# Patient Record
Sex: MALE | Race: WHITE
[De-identification: names, ages, dates, MRNs, and addresses within clinical notes are randomized per-mention and may not be internally consistent; named-entity substitution may affect disease eponyms.]

---

## 2017-02-16 LAB
ALBUMIN SERPL-MCNC: 2.9 G/DL (ref 3.2–4.5)
ALT SERPL-CCNC: 24 U/L (ref 0–55)
ANION GAP SERPL CALC-SCNC: 10 MMOL/L (ref 5–14)
AST SERPL-CCNC: 22 U/L (ref 5–34)
BASOPHILS # BLD AUTO: 0.1 10^3/UL (ref 0–0.1)
BASOPHILS NFR BLD AUTO: 1 % (ref 0–10)
BILIRUB SERPL-MCNC: 0.5 MG/DL (ref 0.1–1)
BUN SERPL-MCNC: 20 MG/DL (ref 7–18)
BUN/CREAT SERPL: 32
CALCIUM SERPL-MCNC: 9.2 MG/DL (ref 8.5–10.1)
CHLORIDE SERPL-SCNC: 94 MMOL/L (ref 98–107)
CO2 SERPL-SCNC: 28 MMOL/L (ref 21–32)
CREAT SERPL-MCNC: 0.63 MG/DL (ref 0.6–1.3)
EOSINOPHIL # BLD AUTO: 0.2 10^3/UL (ref 0–0.3)
EOSINOPHIL NFR BLD AUTO: 2 % (ref 0–10)
ERYTHROCYTE [DISTWIDTH] IN BLOOD BY AUTOMATED COUNT: 19.3 % (ref 10–14.5)
GFR SERPLBLD BASED ON 1.73 SQ M-ARVRAT: > 60 ML/MIN
GLUCOSE SERPL-MCNC: 407 MG/DL (ref 70–105)
LDH SERPL L TO P-CCNC: 402 U/L (ref 125–220)
LYMPHOCYTES # BLD AUTO: 1 X 10^3 (ref 1–4)
LYMPHOCYTES NFR BLD AUTO: 8 % (ref 12–44)
MCH RBC QN AUTO: 31 PG (ref 25–34)
MCHC RBC AUTO-ENTMCNC: 31 G/DL (ref 32–36)
MCV RBC AUTO: 98 FL (ref 80–99)
MONOCYTES # BLD AUTO: 0.6 X 10^3 (ref 0–1)
MONOCYTES NFR BLD AUTO: 5 % (ref 0–12)
NEUTROPHILS # BLD AUTO: 10.8 X 10^3 (ref 1.8–7.8)
NEUTROPHILS NFR BLD AUTO: 85 % (ref 42–75)
PLATELET # BLD: 852 10^3/UL (ref 130–400)
PMV BLD AUTO: 10.1 FL (ref 7.4–10.4)
POTASSIUM SERPL-SCNC: 4.4 MMOL/L (ref 3.6–5)
PROT SERPL-MCNC: 6.9 G/DL (ref 6.4–8.2)
RBC # BLD AUTO: 5.14 10^6/UL (ref 4.35–5.85)
SODIUM SERPL-SCNC: 132 MMOL/L (ref 135–145)
WBC # BLD AUTO: 12.7 10^3/UL (ref 4.3–11)

## 2017-04-13 ENCOUNTER — HOSPITAL ENCOUNTER (OUTPATIENT)
Dept: HOSPITAL 75 - ONC | Age: 67
LOS: 34 days | Discharge: HOME | End: 2017-05-17
Attending: INTERNAL MEDICINE
Payer: MEDICARE

## 2017-04-13 DIAGNOSIS — I10: ICD-10-CM

## 2017-04-13 DIAGNOSIS — F17.210: ICD-10-CM

## 2017-04-13 DIAGNOSIS — Z79.899: ICD-10-CM

## 2017-04-13 DIAGNOSIS — E11.9: ICD-10-CM

## 2017-04-13 DIAGNOSIS — Z79.01: ICD-10-CM

## 2017-04-13 DIAGNOSIS — D45: Primary | ICD-10-CM

## 2017-04-13 LAB
ALBUMIN SERPL-MCNC: 3.2 G/DL (ref 3.2–4.5)
ALT SERPL-CCNC: 41 U/L (ref 0–55)
ANION GAP SERPL CALC-SCNC: 13 MMOL/L (ref 5–14)
AST SERPL-CCNC: 30 U/L (ref 5–34)
BILIRUB SERPL-MCNC: 0.4 MG/DL (ref 0.1–1)
BUN SERPL-MCNC: 16 MG/DL (ref 7–18)
BUN/CREAT SERPL: 25
CALCIUM SERPL-MCNC: 9.1 MG/DL (ref 8.5–10.1)
CHLORIDE SERPL-SCNC: 90 MMOL/L (ref 98–107)
CO2 SERPL-SCNC: 26 MMOL/L (ref 21–32)
CREAT SERPL-MCNC: 0.63 MG/DL (ref 0.6–1.3)
GFR SERPLBLD BASED ON 1.73 SQ M-ARVRAT: > 60 ML/MIN
GLUCOSE SERPL-MCNC: 384 MG/DL (ref 70–105)
LDH SERPL L TO P-CCNC: 322 U/L (ref 125–220)
POTASSIUM SERPL-SCNC: 4.2 MMOL/L (ref 3.6–5)
PROT SERPL-MCNC: 7 G/DL (ref 6.4–8.2)
SODIUM SERPL-SCNC: 129 MMOL/L (ref 135–145)

## 2017-04-13 PROCEDURE — 96365 THER/PROPH/DIAG IV INF INIT: CPT

## 2017-04-13 PROCEDURE — 99195 PHLEBOTOMY: CPT

## 2017-04-13 PROCEDURE — 80053 COMPREHEN METABOLIC PANEL: CPT

## 2017-04-13 PROCEDURE — 83615 LACTATE (LD) (LDH) ENZYME: CPT

## 2017-04-13 PROCEDURE — 82728 ASSAY OF FERRITIN: CPT

## 2017-04-13 PROCEDURE — 99213 OFFICE O/P EST LOW 20 MIN: CPT

## 2017-04-13 PROCEDURE — 85025 COMPLETE CBC W/AUTO DIFF WBC: CPT

## 2017-04-13 PROCEDURE — 96360 HYDRATION IV INFUSION INIT: CPT

## 2017-04-13 PROCEDURE — 36415 COLL VENOUS BLD VENIPUNCTURE: CPT

## 2017-06-22 ENCOUNTER — HOSPITAL ENCOUNTER (OUTPATIENT)
Dept: HOSPITAL 75 - ONC | Age: 67
LOS: 90 days | Discharge: HOME | End: 2017-09-20
Attending: INTERNAL MEDICINE
Payer: MEDICARE

## 2017-06-22 LAB
ALBUMIN SERPL-MCNC: 3.5 GM/DL (ref 3.2–4.5)
ALT SERPL-CCNC: 19 U/L (ref 0–55)
ANION GAP SERPL CALC-SCNC: 12 MMOL/L (ref 5–14)
AST SERPL-CCNC: 27 U/L (ref 5–34)
BASOPHILS # BLD AUTO: 0.1 10^3/UL (ref 0–0.1)
BASOPHILS NFR BLD AUTO: 1 % (ref 0–10)
BILIRUB SERPL-MCNC: 0.5 MG/DL (ref 0.1–1)
BUN SERPL-MCNC: 12 MG/DL (ref 7–18)
BUN/CREAT SERPL: 18 (ref 0–20)
CALCIUM SERPL-MCNC: 9.6 MG/DL (ref 8.5–10.1)
CHLORIDE SERPL-SCNC: 95 MMOL/L (ref 98–107)
CO2 SERPL-SCNC: 24 MMOL/L (ref 21–32)
CREAT SERPL-MCNC: 0.66 MG/DL (ref 0.6–1.3)
EOSINOPHIL # BLD AUTO: 0.3 10^3/UL (ref 0–0.3)
EOSINOPHIL NFR BLD AUTO: 3 % (ref 0–10)
ERYTHROCYTE [DISTWIDTH] IN BLOOD BY AUTOMATED COUNT: 21.7 % (ref 10–14.5)
GFR SERPLBLD BASED ON 1.73 SQ M-ARVRAT: > 60 ML/MIN
GLUCOSE SERPL-MCNC: 302 MG/DL (ref 70–105)
HEMOLYSIS: 75 (ref -100–29)
ICTERUS: 0.2 (ref -100–1.9)
LDH SERPL L TO P-CCNC: 335 U/L (ref 125–220)
LIPEMIA: 29 (ref -100–49)
LYMPHOCYTES # BLD AUTO: 1.2 X 10^3 (ref 1–4)
LYMPHOCYTES NFR BLD AUTO: 12 % (ref 12–44)
MCH RBC QN AUTO: 31 PG (ref 25–34)
MCHC RBC AUTO-ENTMCNC: 32 G/DL (ref 32–36)
MCV RBC AUTO: 95 FL (ref 80–99)
MONOCYTES # BLD AUTO: 0.6 X 10^3 (ref 0–1)
MONOCYTES NFR BLD AUTO: 6 % (ref 0–12)
NEUTROPHILS # BLD AUTO: 7.3 X 10^3 (ref 1.8–7.8)
NEUTROPHILS NFR BLD AUTO: 77 % (ref 42–75)
PLATELET # BLD: 280 10^3/UL (ref 130–400)
PMV BLD AUTO: 10.8 FL (ref 7.4–10.4)
POTASSIUM SERPL-SCNC: 3.9 MMOL/L (ref 3.6–5)
PROT SERPL-MCNC: 8 GM/DL (ref 6.4–8.2)
RBC # BLD AUTO: 5.68 10^6/UL (ref 4.35–5.85)
SODIUM SERPL-SCNC: 131 MMOL/L (ref 135–145)
WBC # BLD AUTO: 9.4 10^3/UL (ref 4.3–11)

## 2017-06-22 PROCEDURE — 80053 COMPREHEN METABOLIC PANEL: CPT

## 2017-06-22 PROCEDURE — 99195 PHLEBOTOMY: CPT

## 2017-06-22 PROCEDURE — 85025 COMPLETE CBC W/AUTO DIFF WBC: CPT

## 2017-06-22 PROCEDURE — 36415 COLL VENOUS BLD VENIPUNCTURE: CPT

## 2017-06-22 PROCEDURE — 83615 LACTATE (LD) (LDH) ENZYME: CPT

## 2018-11-06 NOTE — DIAGNOSTIC IMAGING REPORT
INDICATION: Bilateral foot wounds. The study is performed to

evaluate for osteomyelitis.



TIME OF EXAMINATION: 01:14 p.m.



FINDINGS: Multiple views of bilateral feet were obtained. Right

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

through fifth metatarsals. Resection margins appear to be fairly

smooth. No definite soft tissue gas is identified. Left foot does

show some dorsal soft tissue swelling. No definite bony

destructive changes are seen. No soft tissue gas is identified.

There is generalized demineralization which does limit

evaluation.



IMPRESSION: Postsurgical changes in the right foot. There is soft

tissue swelling on the left. No definite bony destructive changes

are seen to suggest osteomyelitis, however, radiographs may be

insensitive to detection of osteomyelitis in percentage of

patients. If there is continued high clinical index of suspicion,

an MRI or three-phase bone scan could be performed for further

evaluation.



Dictated by: 



  Dictated on workstation # [id number]

## 2018-11-15 NOTE — DIAGNOSTIC IMAGING REPORT
PROCEDURE: MRI left lower extremity with and without contrast.



TECHNIQUE:  Multiplanar, multisequence pre and post

contrast-enhanced MRI of the left lower extremity was

accomplished.



INDICATION: Ulcer on the dorsal aspect of second toe.



COMPARISON: Left foot radiographs from 11/06/2018.



FINDINGS: Dermal ulcer on the dorsal aspect of the second toe at

the level of PIP joint. There is underlying confluent T1

hypointense marrow replacement throughout the distal 2/3rd of the

second proximal phalanx. Minimal marrow changes are also present

within the base of the second middle phalanx. Enhancement is

associated with areas of marrow alteration and these features are

indicative of osteomyelitis.



The remainder of the left forefoot osseous structures have normal

signal and are without osteomyelitis. Marked fatty atrophy of the

musculature of the foot is compatible with long-standing

diabetes. Dorsal subcutaneous edema is present throughout the

forefoot. Enhancing subcutaneous reticulations in the second toe

are indicative of cellulitis. No drainable soft tissue abscess.



IMPRESSION:

1. MRI confirms osteomyelitis of the second middle and proximal

phalanges. No osteomyelitis involvement of the second metatarsal.

2. Second toe cellulitis. No drainable soft tissue abscess within

the left forefoot.



Dictated by: 



  Dictated on workstation # [id number]

## 2018-12-07 NOTE — XMS REPORT
Clinical Summary

 Created on: 2018



Jovanny Shaw

External Reference #: [id number]

: 1950

Sex: Male



Demographics







 Address  [address]  [address]

 

 Home Phone  [phone]

 

 Preferred Language  English

 

 Marital Status  Unknown

 

 Church Affiliation  [employer]

 

 Race  White

 

 Ethnic Group  Not  or 





Author







 Author  [facility]

 

 Organization  [facility]

 

 Address  Unknown

 

 Phone  Unavailable







Support







 Name  Relationship  Address  Phone

 

 Laura Shaw  [employer]  Unknown  [phone]

 

 Leatha Monk  [employer]  Unknown  [phone]







Care Team Providers







 Care Team Member Name  Role  Phone

 

 Saturnino Ash MD  Unavailable  [phone]







Source Comments

Some departments are not documenting in the electronic medical record.  If you 
do not see the information that you expected, contact Release of Information in 
the Health Information Management department at [phone] for further 
assistance in locating additional records.[facility]



Allergies







     Comments



  Active Allergy   Reactions   Severity   Noted Date 

 

      



  Penicillins   UNKNOWN    2014 







Medications







      End Date  Status



  Medication   Sig   Dispensed   Refills   Start  



      Date  

 

         Active



  metFORMIN (GLUCOPHAGE)   Take 1,000 mg    0   



  1,000 mg tablet   by mouth     



   twice daily     



   with meals.     

 

         Active



  sitaGLIPtin (JANUVIA) 100   Take 100 mg    0   



  mg tab tablet   by mouth     



   daily.     

 

         Active



  hydrochlorothiazide   Take 25 mg by    0   



  (HYDRODIURIL) 25 mg   mouth daily.     



  tablet      

 

         Active



  glimepiride (AMARYL) 4 mg   Take 4 mg by    0   



  tablet   mouth twice     



   daily.     

 

         Active



  ramipril (ALTACE) 10 mg   Take 10 mg by    0   



  capsule   mouth twice     



   daily.     

 

         Active



  atorvastatin (LIPITOR) 10   Take 10 mg by    0   



  mg tablet   mouth daily.     

 

         Active



  aspirin 325 mg tablet   Take 650 mg    0   



   by mouth     



   daily.     

 

         Active



  NITROGLYCERIN (NITRO-BID   Apply  to top    0   



  TD)   of skin as     



   directed.     



   Remove at     



   bedtime     

 

         Active



  NAPROXEN SODIUM (ALL DAY   Take 1 Tab by    0   



  PAIN RELIEF PO)   mouth twice     



   daily.     







Active Problems







 



  Problem   Noted Date

 

 



  PAD (peripheral artery disease)   2014

 

 



  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







Social History







     Date



  Tobacco Use   Types   Packs/Day   Years Used 

 

      



  Current Every Day Smoker    









 



  Sex Assigned at Birth   Date Recorded

 

 



  Not on file 









   Industry



  Job Start Date   Occupation 

 

   Not on file



  Not on file   Not on file 









   Travel End



  Travel History   Travel Start 













  No recent travel history available.







Last Filed Vital Signs







   Time Taken



  Vital Sign   Reading 

 

   2014  2:05 PM CDT



  Blood Pressure   131/59 

 

   2014  2:05 PM CDT



  Pulse   79 

 

   2014  2:05 PM CDT



  Temperature   37.1 C (98.8 F) 

 

   2014  2:05 PM CDT



  Respiratory Rate   14 

 

   -



  Oxygen Saturation   - 

 

   -



  Inhaled Oxygen   - 



  Concentration  

 

   2014  2:05 PM CDT



  Weight   102.2 kg (225 lb 3.2 oz) 

 

   2014  2:05 PM CDT



  Height   181.6 cm (5' 11.5") 

 

   2014  2:05 PM CDT



  Body Mass Index   30.97 







Plan of Treatment







   



  Health Maintenance   Due Date   Last Done   Comments

 

   



  HEPATITIS C SCREENING   1950  

 

   



  PHYSICAL (COMPREHENSIVE)   1956  



  EXAM   

 

   



  DTAP/TDAP VACCINES (1 -   1967  



  Tdap)   

 

   



  COLORECTAL CANCER   1999  



  SCREENING   

 

   



  SHINGLES RECOMBINANT   1999  



  VACCINE (1 of 2)   

 

   



  ABDOMINAL AORTIC ANEURYSM   2014  



  SCREENING   

 

   



  PNEUMONIA (PCV13/PPSV23)   2014  



  VACCINES (1 of 2 - PCV13)   

 

   



  INFLUENZA VACCINE   2018  







Results

Not on filefrom Last 3 Months

## 2018-12-07 NOTE — ANESTHESIA-GENERAL POST-OP
MAC


Patient Condition


Mental Status/LOC:  Same as Preop


Cardiovascular:  Satisfactory


Nausea/Vomiting:  Absent


Respiratory:  Satisfactory


Pain:  Controlled


Complications:  Absent





Post Op Complications


Complications


None





Follow Up Care/Instructions


Patient Instructions


None needed.





Anesthesiology Discharge Order


Discharge Order


Patient is doing well, no complaints, stable vital signs, no apparent adverse 

anesthesia problems.   


No complications reported per nursing.











TORO OTERO CRNA Dec 7, 2018 13:06

## 2018-12-07 NOTE — XMS REPORT
Continuity of Care Document

 Created on: 2018



BRYAN MILLS

External Reference #: [id number]

: 1950

Sex: Male



Demographics







 Address  [address] [place]  [address]

 

 Home Phone  [phone] x

 

 Preferred Language  Unknown

 

 Marital Status  Unknown

 

 Advent Affiliation  Unknown

 

 Race  Unknown

 

 Ethnic Group  Unknown





Author







 Author  [facility]

 

 Organization  [facility]

 

 Address  Unknown

 

 Phone  Unavailable



              



Allergies

      





 Active            Description            Code            Type            
Severity            Reaction            Onset            Reported/Identified   
         Relationship to Patient            Clinical Status        

 

 Yes            Penicillins            [id number]            Drug Allergy      
      Unknown            N/A                         2016                
                  



                  



Medications

      



There is no data.                  



Problems

      





 Date Dx Coded            Attending            Type            Code            
Diagnosis            Diagnosed By        

 

 1323            MALISSA BOBLYNN N            Ot            D45            
POLYCYTHEMIA VERA                     

 

 1323            MALISSA, BOBAN N            Ot            Z79.899        
    OTHER LONG TERM (CURRENT) DRUG THERAPY                     

 

 2014            MALISSA, BOBAN N            Ot            238.4          
  POLYCYTHEMIA VERA                     

 

 2014            MALISSA, BOBAN N            Ot            250.00         
   DIAB FABRICE WO COMPL, TYPE II OR UNSPEC TY                     

 

 2014            MALISSA, BOBAN N            Ot            282.7          
  HEMOGLOBINOPATHIES NEC                     

 

 2014            MALISSA, BOBAN N            Ot            305.1          
  TOBACCO USE DISORDER                     

 

 2014            MALISSA, BOBAN N            Ot            401.9          
  HYPERTENSION NOS                     

 

 10/19/2014            MALISSA, BOBAN N            Ot            238.4          
  POLYCYTHEMIA VERA                     

 

 10/19/2014            MALISSA, BOBAN N            Ot            250.00         
   DIAB FABRICE WO COMPL, TYPE II OR UNSPEC TY                     

 

 10/19/2014            MALISSA, BOBAN N            Ot            282.7          
  HEMOGLOBINOPATHIES NEC                     

 

 10/19/2014            MALISSA, BOBAN N            Ot            305.1          
  TOBACCO USE DISORDER                     

 

 10/19/2014            MALISSA, BOBAN N            Ot            401.9          
  HYPERTENSION NOS                     

 

 2014            MALISSA, BOBAN N            Ot            250.00         
                         

 

 2014            MALISSA, BOBAN N            Ot            282.7          
                        

 

 2014            MALISSA, BOBAN N            Ot            305.1          
                        

 

 2014            MALISSA, BOBAN N            Ot            401.9          
                        

 

 2014            RHYS SOTO ARNP            Ot            238.4    
                              

 

 2014            RHYS SOTO ARNP            Ot            250.00   
                               

 

 2014            RHYS SOTO ARNP            Ot            282.7    
                              

 

 2014            BRITTANY RHYS S ARNP            Ot            305.1    
                              

 

 2014            BRITTANY RHYS S ARNP            Ot            401.9    
                              

 

 2014            MALISSA, BOBAN N            Ot            238.4          
                        

 

 2014            MALISSA, BOBAN N            Ot            250.00         
                         

 

 2014            MALISSA, BOBAN N            Ot            282.7          
                        

 

 2014            MALISSA, BOBAN N            Ot            305.1          
                        

 

 2014            MALISSA, BOBAN N            Ot            401.9          
                        

 

 2014            SOTO RHYS S ARNP            Ot            238.4    
                              

 

 2014            SOTO RHYS S ARNP            Ot            250.00   
                               

 

 2014            SOTO, RHYS S ARNP            Ot            282.7    
                              

 

 2014            BRITTANY RHYS S ARNP            Ot            305.1    
                              

 

 2014            BRITTANY RHYS S ARNP            Ot            401.9    
                              

 

 2014            MALISSA, ABDULKADIRAN N            Ot            238.4          
                        

 

 2014            MALISSAABDULKADIRAN N            Ot            250.00         
                         

 

 2014            MALISSA, ABDULKADIRAN N            Ot            282.7          
                        

 

 2014            MALISSA, BOBAN N            Ot            305.1          
                        

 

 2014            MALISSA, BOBAN N            Ot            401.9          
                        

 

 2014            BRITTANY RHYS S ARNP            Ot            238.4    
                              

 

 2014            BRITTANY RHYS S ARNP            Ot            250.00   
                               

 

 2014            BRITTANY RHYS S ARNP            Ot            282.7    
                              

 

 2014            SOTO, RHYS S ARNP            Ot            305.1    
                              

 

 2014            KAVON SOTOCHIQUIS S ARNP            Ot            401.9    
                              

 

 12/15/2014            MALISSA, BOBAN N            Ot            238.4          
                        

 

 12/15/2014            MALISSA, BOBAN N            Ot            250.00         
                         

 

 12/15/2014            MALISSA, BOBAN N            Ot            282.7          
                        

 

 12/15/2014            MALISSA, BOBAN N            Ot            305.1          
                        

 

 12/15/2014            MALISSA, BOBAN N            Ot            401.9          
                        

 

 2015            MALISSA, BOBAN N            Ot            238.4          
  POLYCYTHEMIA VERA                     

 

 2015            MALISSA, BOBAN N            Ot            250.00         
   DIAB FABRICE WO COMPL, TYPE II OR UNSPEC TY                     

 

 2015            MALISSA BOBAN N            Ot            282.7          
  HEMOGLOBINOPATHIES NEC                     

 

 2015            MALISSA, BOBAN N            Ot            305.1          
  TOBACCO USE DISORDER                     

 

 2015            MALISSA, BOBAN N            Ot            401.9          
  HYPERTENSION NOS                     

 

 2015            MALISSA, BOBAN N            Ot            238.4          
                        

 

 2015            MALISSA, BOBAN N            Ot            250.00         
                         

 

 2015            MALISSA, BOBAN N            Ot            282.7          
                        

 

 2015            MALISSA, BOBAN N            Ot            305.1          
                        

 

 2015            MALISSA, BOBAN N            Ot            401.9          
                        

 

 2015            MALISSA, BOBAN N            Ot            238.4          
                        

 

 2015            MALISSA, BOBAN N            Ot            250.00         
                         

 

 2015            MALISSA, BOBAN N            Ot            282.7          
                        

 

 2015            MALISSA, BOBAN N            Ot            305.1          
                        

 

 2015            MALISSA, BOBAN N            Ot            401.9          
                        

 

 2015            SOTORHYS GOMEZ S ARNP            Ot            238.4    
                              

 

 2015            SOTORHYS GOMEZ S ARNP            Ot            250.00   
                               

 

 2015            RHYS SOTO S ARNP            Ot            282.7    
                              

 

 2015            RHYS SOTO S ARNP            Ot            305.1    
                              

 

 2015            RHYS SOTO S ARNP            Ot            401.9    
                              

 

 2015            MALISSA, BOBAN N            Ot            238.4          
  POLYCYTHEMIA VERA                     

 

 2015            MALISAS, BOBAN N            Ot            250.00         
   DIAB FABRICE WO COMPL, TYPE II OR UNSPEC TY                     

 

 2015            MALISSA, BOBAN N            Ot            282.7          
  HEMOGLOBINOPATHIES NEC                     

 

 2015            MALISSA, BOBAN N            Ot            305.1          
  TOBACCO USE DISORDER                     

 

 2015            MALISSA, BOBAN N            Ot            401.9          
  HYPERTENSION NOS                     

 

 2015            MALISSA, BOBAN N            Ot            238.4          
                        

 

 2015            MALISSA, BOBAN N            Ot            250.00         
                         

 

 2015            MALISSA, BOBAN N            Ot            282.7          
                        

 

 2015            MALISSA, BOBAN N            Ot            305.1          
                        

 

 2015            MALISSA, BOBAN N            Ot            401.9          
                        

 

 2015            MALISSA, BOBAN N            Ot            238.4          
                        

 

 2015            MALISSA, BOBAN N            Ot            250.00         
                         

 

 2015            MALISSA, BOBAN N            Ot            282.7          
                        

 

 2015            MALISSA, BOBAN N            Ot            305.1          
                        

 

 2015            MALISSA, BOBAN N            Ot            401.9          
                        

 

 2015            MALISSA, BOBAN N            Ot            238.4          
                        

 

 2015            MALISSA, BOBAN N            Ot            250.00         
                         

 

 2015            MALISSA, BOBAN N            Ot            282.7          
                        

 

 2015            MALISSA, BOBAN N            Ot            305.1          
                        

 

 2015            MALISSA, BOBAN N            Ot            401.9          
                        

 

 2015            MALISSA, BOBAN N            Ot            238.4          
                        

 

 2015            MALISSA, BOBAN N            Ot            250.00         
                         

 

 2015            MALISSA, BOBAN N            Ot            282.7          
                        

 

 2015            MALISSA, BOBAN N            Ot            305.1          
                        

 

 2015            MALISSA, BOBAN N            Ot            401.9          
                        

 

 2015            SOTO, HILAH S ARNP            Ot            238.4    
                              

 

 2015            SOTO HILAH S ARNP            Ot            250.00   
                               

 

 2015            SOTO, HILAH S ARNP            Ot            282.7    
                              

 

 2015            SOTO, HILAH S ARNP            Ot            305.1    
                              

 

 2015            SOTO, HILAH S ARNP            Ot            401.9    
                              

 

 2015            MALISSA, BOBAN N            Ot            250.00         
                         

 

 2015            MALISSA, BOBAN N            Ot            282.7          
                        

 

 2015            MALISSA, BOBAN N            Ot            305.1          
                        

 

 2015            MALISSA, ABDULKADIRAN N            Ot            401.9          
                        

 

 2015            SOTO HILAH S ARNP            Ot            238.4    
                              

 

 2015            SOTO HILAH S ARNP            Ot            250.00   
                               

 

 2015            SOTO HILAH S ARNP            Ot            282.7    
                              

 

 2015            SOTO HILAH S ARNP            Ot            305.1    
                              

 

 2015            SOTO HILAH S ARNP            Ot            401.9    
                              

 

 2015            SOTO HILAH S ARNP            Ot            238.4    
                              

 

 2015            SOTO, HILAH S ARNP            Ot            250.00   
                               

 

 2015            SOTO HILAH S ARNP            Ot            282.7    
                              

 

 2015            SOTO HILAH S ARNP            Ot            305.1    
                              

 

 2015            SOTO HILAH S ARNP            Ot            401.9    
                              

 

 2015            SOTO HILAH S ARNP            Ot            238.4    
                              

 

 2015            SOTO, HILAH S ARNP            Ot            250.00   
                               

 

 2015            SOTORHYS GOMEZ S ARNP            Ot            282.7    
                              

 

 2015            SOTORHYS GOMEZ S ARNP            Ot            305.1    
                              

 

 2015            RHYS SOTO S ARNP            Ot            401.9    
                              

 

 2015            MALISSA BOBAN N            Ot            238.4          
                        

 

 2015            MALISSA ABDULKADIRAN N            Ot            250.00         
                         

 

 2015            MALISSA RACHEL N            Ot            282.7          
                        

 

 2015            MALISSA BOBAN N            Ot            305.1          
                        

 

 2015            MALISSA BOBAN N            Ot            401.9          
                        

 

 2015            KANNAN CHAVEZ MD            Ot            250.92    
                              

 

 2015            BRITTANY RHYS S ARNP            Ot            238.4    
                              

 

 2015            SOTORHYS GOMEZ S ARNP            Ot            250.00   
                               

 

 2015            SOTO RHYS S ARNP            Ot            282.7    
                              

 

 2015            SOTO RHYS S ARNP            Ot            305.1    
                              

 

 2015            SOTO RHYS S ARNP            Ot            401.9    
                              

 

 2015            SOTORHYS GOMEZ S ARNP            Ot            238.4    
                              

 

 2015            SOTORHYS GOMEZ S ARNP            Ot            250.00   
                               

 

 2015            SOTORHYS GOMEZ S ARNP            Ot            282.7    
                              

 

 2015            SOTORHYS GOMEZ S ARNP            Ot            305.1    
                              

 

 2015            SOTORHYS GOMEZ S ARNP            Ot            401.9    
                              

 

 2015            KANNAN CHAVEZ MD            Ot            250.92    
                              

 

 2015            SOTO RHYS S ARNP            Ot            238.4    
                              

 

 2015            SOTO RHYS S ARNP            Ot            250.00   
                               

 

 2015            SOTO RHYS S ARNP            Ot            282.7    
                              

 

 2015            SOTO RHYS S ARNP            Ot            305.1    
                              

 

 2015            BRITTANY RHYS S ARNP            Ot            401.9    
                              

 

 2015            KANNAN CHAVEZ MD            Ot            250.92    
                              

 

 2015            RACHEL LEONARDO N            Ot            238.4          
  POLYCYTHEMIA VERA                     

 

 2015            RACHEL LEONARDO N            Ot            250.00         
   DIAB FABRICE WO COMPL, TYPE II OR UNSPEC TY                     

 

 2015            RACHEL LEONARDO N            Ot            282.7          
  HEMOGLOBINOPATHIES NEC                     

 

 2015            MALISSAABDULKADIR LOYAAN N            Ot            305.1          
  TOBACCO USE DISORDER                     

 

 2015            MALISSA, BOBAN N            Ot            401.9          
  HYPERTENSION NOS                     

 

 2015            RHYS SOTO S ARNP            Ot            238.4    
                              

 

 2015            RHYS SOTO S ARNP            Ot            250.00   
                               

 

 2015            RHYS SOTO S ARNP            Ot            282.7    
                              

 

 2015            RHYS SOTO S ARNP            Ot            305.1    
                              

 

 2015            RHYS SOTO S ARNP            Ot            401.9    
                              

 

 2015            KANNAN CHAVEZ MD            Ot            250.92    
                              

 

 2015            KANNAN CHAVEZ MD            Ot            250.92    
                              

 

 2015            MALISSA, BOBAN N            Ot            238.4          
                        

 

 2015            MALISSA, BOBAN N            Ot            250.00         
                         

 

 2015            MALISSA, BOBAN N            Ot            282.7          
                        

 

 2015            MALISSA, BOBAN N            Ot            305.1          
                        

 

 2015            MALISSA, BOBAN N            Ot            401.9          
                        

 

 2015            MALISSA, BOBAN N            Ot            D45            
                      

 

 2015            MALISSA, BOBAN N            Ot            E11.9          
                        

 

 2015            MALISSA, BOBAN N            Ot            F17.200        
                          

 

 2015            MALISSA, BOBAN N            Ot            I10            
                      

 

 2015            MALISSA, BOBAN N            Ot            238.4          
  POLYCYTHEMIA VERA                     

 

 2015            MALISSA, BOBAN N            Ot            250.00         
   DIAB FABRICE WO COMPL, TYPE II OR UNSPEC TY                     

 

 2015            MALISSA, BOBAN N            Ot            282.7          
  HEMOGLOBINOPATHIES NEC                     

 

 2015            MALISSA, BOBAN N            Ot            305.1          
  TOBACCO USE DISORDER                     

 

 2015            MALISSA, BOBAN N            Ot            401.9          
  HYPERTENSION NOS                     

 

 10/14/2015            RHYS SOTO S ARNP            Ot            238.4    
                              

 

 10/14/2015            RHYS SOTO S ARNP            Ot            282.7    
                              

 

 10/14/2015            RHYS SOTO S ARNP            Ot            723.0    
                              

 

 10/14/2015            RHYS SOTO S ARNP            Ot            V58.69   
                               

 

 2015            RHYS SOTO S ARNP            Ot            238.4    
                              

 

 2015            RHYS SOTO S ARNP            Ot            250.00   
                               

 

 2015            RHYS SOTO ARNP            Ot            282.7    
                              

 

 2015            RHYS SOTO ARNP            Ot            305.1    
                              

 

 2015            RHYS SOTO S ARNP            Ot            401.9    
                              

 

 2015            RACHEL LEONARDO N            Ot            238.4          
                        

 

 2015            MALISSARACHEL LOYA N            Ot            250.00         
                         

 

 2015            MALISSARACHEL LOYA N            Ot            282.7          
                        

 

 2015            MALISSARACHEL LOYA N            Ot            305.1          
                        

 

 2015            MALISSARACHEL LOYA N            Ot            401.9          
                        

 

 2015            MALISSARACHEL LOYA N            Ot            D45            
                      

 

 2015            MALISSAABDULKADIR LOYAAN N            Ot            E11.9          
                        

 

 2015            MALISSAABDULKADIR LOYAAN N            Ot            F17.200        
                          

 

 2015            RACHEL LEONARDO N            Ot            I10            
                      

 

 2016            SOTORHYS GOMEZ ARNP            Ot            D45      
                            

 

 2016            SOTOHRYS GOMEZ S ARNP            Ot            E11.9    
                              

 

 2016            SOTORHYS GOMEZ S ARNP            Ot            F17.210  
                                

 

 2016            RHYS SOTO S ARNP            Ot            I10      
                            

 

 2016            SOTORHYS GOMEZ ARNP            Ot            Z79.899  
                                

 

 2016            RACHEL LEONARDO N            Ot            D45            
POLYCYTHEMIA VERA                     

 

 2016            RACHEL LEONARDO N            Ot            Z79.899        
    OTHER LONG TERM (CURRENT) DRUG THERAPY                     

 

 2016            RACHEL LEONARDO N            Ot            D45            
                      

 

 2016            ABDULKADIR LEONARDOAN N            Ot            Z79.899        
                          

 

 2016            ABDULKADIR LEONARDOAN N            Ot            D45            
                      

 

 2016            RACHEL LEONARDO N            Ot            Z79.899        
                          

 

 2016            SACHI PADILLA ARNP            Ot            Z51.81    
                              

 

 2016            SACHI PADILLA ARNP            Ot            Z79.01    
                              

 

 2016            SACHI PADILLA ARNP            Ot            Z51.81    
        ENCOUNTER FOR THERAPEUTIC DRUG LEVEL MON                     

 

 2016            SACHI PADILLA ARNP            Ot            Z79.01    
        LONG TERM (CURRENT) USE OF ANTICOAGULANT                     

 

 2016            RHYS SOTO S ARNP            Ot            D45      
      POLYCYTHEMIA VERA                     

 

 2016            RHYS SOTO S ARNP            Ot            E11.9    
        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  
          NICOTINE DEPENDENCE, CIGARETTES, UNCOMPL                     

 

 2016            RHYS SOTO ARNP            Ot            I10      
      ESSENTIAL (PRIMARY) HYPERTENSION                     

 

 2016            RHYS SOTO ARNP            Ot            Z79.899  
          OTHER LONG TERM (CURRENT) DRUG THERAPY                     

 

 2016            EADOSACHI GREENE ARNP            Ot            Z51.81    
        ENCOUNTER FOR THERAPEUTIC DRUG LEVEL MON                     

 

 2016            EADOSACHI GREENE ARNP            Ot            Z79.01    
        LONG TERM (CURRENT) USE OF ANTICOAGULANT                     

 

 2016            EADOCONNOR GREENERI FLORIN ARNP            Ot            Z51.81    
        ENCOUNTER FOR THERAPEUTIC DRUG LEVEL MON                     

 

 2016            EADOSACHI GREENE ARNP            Ot            Z79.01    
        LONG TERM (CURRENT) USE OF ANTICOAGULANT                     

 

 2016            AMYDOSACHI GREENE ARNP            Ot            Z51.81    
        ENCOUNTER FOR THERAPEUTIC DRUG LEVEL MON                     

 

 2016            AMYDOSACHI GREENE ARNP            Ot            Z79.01    
        LONG TERM (CURRENT) USE OF ANTICOAGULANT                     

 

 2016            RHYS SOTO ARNP            Ot            D45      
      POLYCYTHEMIA VERA                     

 

 2016            RHYS SOTO ARNP            Ot            E11.9    
        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  
          NICOTINE DEPENDENCE, CIGARETTES, UNCOMPL                     

 

 2016            RHYS SOTOP            Ot            I10      
      ESSENTIAL (PRIMARY) HYPERTENSION                     

 

 2016            RHYS SOTOP            Ot            Z79.899  
          OTHER LONG TERM (CURRENT) DRUG THERAPY                     

 

 2016            RHYS SOTOP            Ot            D45      
      POLYCYTHEMIA VERA                     

 

 2016            RHYS SOTOP            Ot            E11.9    
        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  
          NICOTINE DEPENDENCE, CIGARETTES, UNCOMPL                     

 

 2016            RHYS SOTO ARNP            Ot            I10      
      ESSENTIAL (PRIMARY) HYPERTENSION                     

 

 2016            RHYS SOTO ARNP            Ot            Z79.01   
         LONG TERM (CURRENT) USE OF ANTICOAGULANT                     

 

 2016            RHYS SOTO ARNP            Ot            Z79.899  
          OTHER LONG TERM (CURRENT) DRUG THERAPY                     

 

 2016            KAVON SOTOAH S ARNP            Ot            D45      
      POLYCYTHEMIA VERA                     

 

 2016            RHYS SOTO ARNP            Ot            E11.9    
        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  
          NICOTINE DEPENDENCE, CIGARETTES, UNCOMPL                     

 

 2016            RHYS SOTO ARNP            Ot            I10      
      ESSENTIAL (PRIMARY) HYPERTENSION                     

 

 2016            SOTO RHYS GOMEZ ARNP            Ot            Z79.01   
         LONG TERM (CURRENT) USE OF ANTICOAGULANT                     

 

 2016            RHYS SOTO ARNP            Ot            Z79.899  
          OTHER LONG TERM (CURRENT) DRUG THERAPY                     

 

 2016            RHYS SOTO ARNP            Ot            D45      
      POLYCYTHEMIA VERA                     

 

 2016            RHYS SOTO ARNP            Ot            E11.9    
        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  
          NICOTINE DEPENDENCE, CIGARETTES, UNCOMPL                     

 

 2016            SOTORHYS GOMEZ ARNP            Ot            I10      
      ESSENTIAL (PRIMARY) HYPERTENSION                     

 

 2016            KAVON SOTOCHIQUIS GOMEZ ARNP            Ot            Z79.01   
         LONG TERM (CURRENT) USE OF ANTICOAGULANT                     

 

 2016            BRITTANY RHYS GOMEZ ARNP            Ot            Z79.899  
          OTHER LONG TERM (CURRENT) DRUG THERAPY                     

 

 2016            MALISSARACHEL LOYA N            Ot            D45            
POLYCYTHEMIA VERA                     

 

 2016            MALISSARACHEL LOYA N            Ot            Z79.899        
    OTHER LONG TERM (CURRENT) DRUG THERAPY                     

 

 2016            MALISSARACHEL LOYA N            Ot            D45            
POLYCYTHEMIA VERA                     

 

 2016            MALISSARACHEL LOYA N            Ot            Z79.899        
    OTHER LONG TERM (CURRENT) DRUG THERAPY                     

 

 2016            KAVON SOTOCHIQUIS S ARNP            Ot            Z79.01   
         LONG TERM (CURRENT) USE OF ANTICOAGULANT                     

 

 2016            MALISSARACHEL LOYA N            Ot            D45            
POLYCYTHEMIA VERA                     

 

 2016            MALISSARACHEL LOYA N            Ot            Z79.899        
    OTHER LONG TERM (CURRENT) DRUG THERAPY                     

 

 2016            RHYS SOTO S ARNP            Ot            Z79.01   
         LONG TERM (CURRENT) USE OF ANTICOAGULANT                     

 

 2016            JUDE CHOI MD            Ot            I44.60  
          UNSPECIFIED FASCICULAR BLOCK                     

 

 2016            JUDE CHOI MD            Ot            I45.10  
          UNSPECIFIED RIGHT BUNDLE-BRANCH BLOCK                     

 

 2016            JUDE CHOI MD            Ot            R55     
       SYNCOPE AND COLLAPSE                     

 

 2016            RACHEL LEONARDO            Ot            250.00         
   DIAB FABRICE WO COMPL, TYPE II OR UNSPEC TY                     

 

 2016            RACHEL LEONARDO            Ot            282.7          
  HEMOGLOBINOPATHIES NEC                     

 

 2016            MALISSARACHEL LOYA            Ot            305.1          
  TOBACCO USE DISORDER                     

 

 2016            MALISSARACHEL LOYA N            Ot            401.9          
  HYPERTENSION NOS                     

 

 2016            SOTO, HILAH S ARNP            Ot            238.4    
        POLYCYTHEMIA VERA                     

 

 2016            SOTO, HILAH S ARNP            Ot            250.00   
         DIAB FABRICE WO COMPL, TYPE II OR UNSPEC TY                     

 

 2016            SOTO, HILAH S ARNP            Ot            282.7    
        HEMOGLOBINOPATHIES NEC                     

 

 2016            SOTO, HILAH S ARNP            Ot            305.1    
        TOBACCO USE DISORDER                     

 

 2016            SOTO, HILAH S ARNP            Ot            401.9    
        HYPERTENSION NOS                     

 

 2016            SOTO, HILAH S ARNP            Ot            238.4    
        POLYCYTHEMIA VERA                     

 

 2016            SOTO, HILAH S ARNP            Ot            250.00   
         DIAB FABRICE WO COMPL, TYPE II OR UNSPEC TY                     

 

 2016            SOTO, HILAH S ARNP            Ot            282.7    
        HEMOGLOBINOPATHIES NEC                     

 

 2016            SOTO, HILAH S ARNP            Ot            305.1    
        TOBACCO USE DISORDER                     

 

 2016            SOTO, HILAH S ARNP            Ot            401.9    
        HYPERTENSION NOS                     

 

 2016            SOTO, HILAH S ARNP            Ot            238.4    
        POLYCYTHEMIA VERA                     

 

 2016            SOTO, HILAH S ARNP            Ot            250.00   
         DIAB FABRICE WO COMPL, TYPE II OR UNSPEC TY                     

 

 2016            SOTO, HILAH S ARNP            Ot            282.7    
        HEMOGLOBINOPATHIES NEC                     

 

 2016            SOTO, HILAH S ARNP            Ot            305.1    
        TOBACCO USE DISORDER                     

 

 2016            SOTO, HILAH S ARNP            Ot            401.9    
        HYPERTENSION NOS                     

 

 2016            KANNAN CHAVEZ MD            Ot            250.92    
        DIAB W UNSPEC COMPL, TYPE II OR UNSPEC T                     

 

 2016            SOTO, HILAH S ARNP            Ot            238.4    
        POLYCYTHEMIA VERA                     

 

 2016            SOTO, HILAH S ARNP            Ot            282.7    
        HEMOGLOBINOPATHIES NEC                     

 

 2016            RHYS SOTO ARNP            Ot            723.0    
        CERVICAL SPINAL STENOSIS                     

 

 2016            RHYS SOTO ARNP            Ot            V58.69   
         OT MED,LT,CURRENT USE                     

 

 2016            RHYS SOTO ARNP            Ot            D45      
      POLYCYTHEMIA VERA                     

 

 2016            RHYS SOTO ARNP            Ot            E11.9    
        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  
          NICOTINE DEPENDENCE, CIGARETTES, UNCOMPL                     

 

 2016            RHYS SOTO ARNP            Ot            I10      
      ESSENTIAL (PRIMARY) HYPERTENSION                     

 

 2016            RHYS SOTO ARNP            Ot            Z79.899  
          OTHER LONG TERM (CURRENT) DRUG THERAPY                     

 

 2016            SACHI PADILLA ARNP            Ot            Z51.81    
        ENCOUNTER FOR THERAPEUTIC DRUG LEVEL MON                     

 

 2016            SACHI PADILLA ARNP            Ot            Z79.01    
        LONG TERM (CURRENT) USE OF ANTICOAGULANT                     

 

 2016            SOTORHYS GOMEZ ARNP            Ot            D45      
      POLYCYTHEMIA VERA                     

 

 2016            RHYS SOTO ARNP            Ot            E11.9    
        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  
          NICOTINE DEPENDENCE, CIGARETTES, UNCOMPL                     

 

 2016            RHYS SOTO ARNP            Ot            I10      
      ESSENTIAL (PRIMARY) HYPERTENSION                     

 

 2016            RHYS SOTO ARNP            Ot            Z79.899  
          OTHER LONG TERM (CURRENT) DRUG THERAPY                     

 

 2016            SOTORHYS GOMEZ ARNP            Ot            D45      
      POLYCYTHEMIA VERA                     

 

 2016            RHYS SOTO ARNP            Ot            E11.9    
        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  
          NICOTINE DEPENDENCE, CIGARETTES, UNCOMPL                     

 

 2016            SOTORHYS GOMEZ ARNP            Ot            I10      
      ESSENTIAL (PRIMARY) HYPERTENSION                     

 

 2016            RHYS SOTO ARNP            Ot            Z79.01   
         LONG TERM (CURRENT) USE OF ANTICOAGULANT                     

 

 2016            OSTORHYS GOMEZ ARNP            Ot            Z79.899  
          OTHER LONG TERM (CURRENT) DRUG THERAPY                     

 

 2016            MALISSA, BOBAN N            Ot            D45            
POLYCYTHEMIA VERA                     

 

 2016            RACHEL LEONARDO            Ot            Z79.899        
    OTHER LONG TERM (CURRENT) DRUG THERAPY                     

 

 2016            RHYS SOTO ARNP            Ot            Z79.01   
         LONG TERM (CURRENT) USE OF ANTICOAGULANT                     

 

 2016            RACHEL LEONARDO N            Ot            D45            
POLYCYTHEMIA VERA                     

 

 2016            RACHEL LEONARDO N            Ot            Z79.899        
    OTHER LONG TERM (CURRENT) DRUG THERAPY                     

 

 2016            RACHEL LEONARDO N            Ot            D45            
POLYCYTHEMIA VERA                     

 

 2016            RACHEL LEONARDO N            Ot            Z79.899        
    OTHER LONG TERM (CURRENT) DRUG THERAPY                     

 

 2016            RHYS SOTO S ARNP            Ot            D45      
      POLYCYTHEMIA VERA                     

 

 2016            RHYS SOTO S ARNP            Ot            E11.9    
        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  
          NICOTINE DEPENDENCE, CIGARETTES, UNCOMPL                     

 

 2016            RHYS SOTO S ARNP            Ot            I10      
      ESSENTIAL (PRIMARY) HYPERTENSION                     

 

 2016            RHYS SOTO S ARNP            Ot            Z79.01   
         LONG TERM (CURRENT) USE OF ANTICOAGULANT                     

 

 2016            RHYS SOTO S ARNP            Ot            Z79.899  
          OTHER LONG TERM (CURRENT) DRUG THERAPY                     

 

 2016            STEPH MARTIN, JUDE STATON            Ot            I44.60  
          UNSPECIFIED FASCICULAR BLOCK                     

 

 2016            STEPH MARTIN, JUDE STATON            Ot            I45.10  
          UNSPECIFIED RIGHT BUNDLE-BRANCH BLOCK                     

 

 2016            STEPH MARTIN, JUDE STATON            Ot            R55     
       SYNCOPE AND COLLAPSE                     

 

 2016            RHYS SOTO S ARNP            Ot            D45      
      POLYCYTHEMIA VERA                     

 

 2016            RHYS SOTO ARNP            Ot            E11.9    
        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  
          NICOTINE DEPENDENCE, CIGARETTES, UNCOMPL                     

 

 2016            RHYS SOTO S ARNP            Ot            I10      
      ESSENTIAL (PRIMARY) HYPERTENSION                     

 

 2016            RHYS SOTO S ARNP            Ot            Z79.01   
         LONG TERM (CURRENT) USE OF ANTICOAGULANT                     

 

 2016            RHYS SOTO S ARNP            Ot            Z79.899  
          OTHER LONG TERM (CURRENT) DRUG THERAPY                     

 

 10/10/2016            RACHEL LEONARDO N            Ot            250.00         
   DIAB FABRICE WO COMPL, TYPE II OR UNSPEC TY                     

 

 10/10/2016            RACHEL LEONARDO N            Ot            282.7          
  HEMOGLOBINOPATHIES NEC                     

 

 10/10/2016            RACHEL LEONARDO N            Ot            305.1          
  TOBACCO USE DISORDER                     

 

 10/10/2016            RACHEL LEONARDO N            Ot            401.9          
  HYPERTENSION NOS                     

 

 10/10/2016            SOTO HILAH S ARNP            Ot            238.4    
        POLYCYTHEMIA VERA                     

 

 10/10/2016            SOTO HILAH S ARNP            Ot            250.00   
         DIAB FABRICE WO COMPL, TYPE II OR UNSPEC TY                     

 

 10/10/2016            SOTO HILAH S ARNP            Ot            282.7    
        HEMOGLOBINOPATHIES NEC                     

 

 10/10/2016            SOTO HILAH S ARNP            Ot            305.1    
        TOBACCO USE DISORDER                     

 

 10/10/2016            SOTO HILAH S ARNP            Ot            401.9    
        HYPERTENSION NOS                     

 

 10/10/2016            SOTO HILAH S ARNP            Ot            238.4    
        POLYCYTHEMIA VERA                     

 

 10/10/2016            SOTO HILAH S ARNP            Ot            250.00   
         DIAB FABRICE WO COMPL, TYPE II OR UNSPEC TY                     

 

 10/10/2016            SOTO, HILAH S ARNP            Ot            282.7    
        HEMOGLOBINOPATHIES NEC                     

 

 10/10/2016            SOTO, HILAH S ARNP            Ot            305.1    
        TOBACCO USE DISORDER                     

 

 10/10/2016            SOTO HILAH S ARNP            Ot            401.9    
        HYPERTENSION NOS                     

 

 10/10/2016            SOTO HILAH S ARNP            Ot            238.4    
        POLYCYTHEMIA VERA                     

 

 10/10/2016            SOTO HILAH S ARNP            Ot            250.00   
         DIAB FABRICE WO COMPL, TYPE II OR UNSPEC TY                     

 

 10/10/2016            SOTO HILAH S ARNP            Ot            282.7    
        HEMOGLOBINOPATHIES NEC                     

 

 10/10/2016            SOTO HILAH S ARNP            Ot            305.1    
        TOBACCO USE DISORDER                     

 

 10/10/2016            SOTO HILAH S ARNP            Ot            401.9    
        HYPERTENSION NOS                     

 

 10/10/2016            KANNAN CHAVEZ MD            Ot            250.92    
        DIAB W UNSPEC COMPL, TYPE II OR UNSPEC T                     

 

 10/10/2016            SOTO HILAH S ARNP            Ot            238.4    
        POLYCYTHEMIA VERA                     

 

 10/10/2016            SOTO HILAH S ARNP            Ot            282.7    
        HEMOGLOBINOPATHIES NEC                     

 

 10/10/2016            SOTO HILAH S ARNP            Ot            723.0    
        CERVICAL SPINAL STENOSIS                     

 

 10/10/2016            RHYS SOTO ARNP            Ot            V58.69   
         OTH MED,LT,CURRENT USE                     

 

 10/10/2016            RHYS SOTO ARNP            Ot            D45      
      POLYCYTHEMIA VERA                     

 

 10/10/2016            RHYS SOTO ARNP            Ot            E11.9    
        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                     

 

 10/10/2016            RHYS SOTO ARNP            Ot            F17.210  
          NICOTINE DEPENDENCE, CIGARETTES, UNCOMPL                     

 

 10/10/2016            RHYS SOTO ARNP            Ot            I10      
      ESSENTIAL (PRIMARY) HYPERTENSION                     

 

 10/10/2016            SOTORHYS GOMEZ ARNP            Ot            Z79.899  
          OTHER LONG TERM (CURRENT) DRUG THERAPY                     

 

 10/10/2016            SACHI PADILLA ARNP            Ot            Z51.81    
        ENCOUNTER FOR THERAPEUTIC DRUG LEVEL MON                     

 

 10/10/2016            SACHI PADILLA ARNP            Ot            Z79.01    
        LONG TERM (CURRENT) USE OF ANTICOAGULANT                     

 

 10/10/2016            SOTORHYS GOMEZ ARNP            Ot            D45      
      POLYCYTHEMIA VERA                     

 

 10/10/2016            SOTORHYS GOMEZ ARNP            Ot            E11.9    
        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                     

 

 10/10/2016            RHYS SOTO ARNP            Ot            F17.210  
          NICOTINE DEPENDENCE, CIGARETTES, UNCOMPL                     

 

 10/10/2016            RHYS SOTO ARNP            Ot            I10      
      ESSENTIAL (PRIMARY) HYPERTENSION                     

 

 10/10/2016            SOTORHYS GOMEZ ARNP            Ot            Z79.899  
          OTHER LONG TERM (CURRENT) DRUG THERAPY                     

 

 10/10/2016            SOTORHYS GOMEZ ARNP            Ot            D45      
      POLYCYTHEMIA VERA                     

 

 10/10/2016            SOTORHYS GOMEZ ARNP            Ot            E11.9    
        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                     

 

 10/10/2016            SOTORHYS GOMEZ ARNP            Ot            F17.210  
          NICOTINE DEPENDENCE, CIGARETTES, UNCOMPL                     

 

 10/10/2016            SOTORHYS GOMEZ ARNP            Ot            I10      
      ESSENTIAL (PRIMARY) HYPERTENSION                     

 

 10/10/2016            BRITTANYRHYS ARNP            Ot            Z79.01   
         LONG TERM (CURRENT) USE OF ANTICOAGULANT                     

 

 10/10/2016            BRITTANYRHYS ARNP            Ot            Z79.899  
          OTHER LONG TERM (CURRENT) DRUG THERAPY                     

 

 10/10/2016            RACHEL LEONARDO            Ot            D45            
POLYCYTHEMIA VERA                     

 

 10/10/2016            RACHEL LEONARDO            Ot            Z79.899        
    OTHER LONG TERM (CURRENT) DRUG THERAPY                     

 

 10/10/2016            RHYS SOTO ARNP            Ot            D45      
      POLYCYTHEMIA VERA                     

 

 10/10/2016            RHYS SOTO ARNP            Ot            E11.9    
        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                     

 

 10/10/2016            RHYS SOTO ARNP            Ot            F17.210  
          NICOTINE DEPENDENCE, CIGARETTES, UNCOMPL                     

 

 10/10/2016            RHYS SOTO ARNP            Ot            I10      
      ESSENTIAL (PRIMARY) HYPERTENSION                     

 

 10/10/2016            RHYS SOTO ARNP            Ot            Z79.01   
         LONG TERM (CURRENT) USE OF ANTICOAGULANT                     

 

 10/10/2016            RHYS SOTO ARNP            Ot            Z79.899  
          OTHER LONG TERM (CURRENT) DRUG THERAPY                     

 

 10/10/2016            RACHEL LEONARDO N            Ot            D45            
POLYCYTHEMIA VERA                     

 

 10/10/2016            RACHEL LEONARDO N            Ot            Z79.899        
    OTHER LONG TERM (CURRENT) DRUG THERAPY                     

 

 2016            RACHEL LEONARDO N            Ot            250.00         
   DIAB FABRICE WO COMPL, TYPE II OR UNSPEC TY                     

 

 2016            RACHEL LEONARDO N            Ot            282.7          
  HEMOGLOBINOPATHIES NEC                     

 

 2016            MALISSARACHEL LOYA N            Ot            305.1          
  TOBACCO USE DISORDER                     

 

 2016            MALISSARACHEL LOYA N            Ot            401.9          
  HYPERTENSION NOS                     

 

 2016            RHYS SOTO S ARNP            Ot            238.4    
        POLYCYTHEMIA VERA                     

 

 2016            RHYS SOTO S ARNP            Ot            250.00   
         DIAB FABRICE WO COMPL, TYPE II OR UNSPEC TY                     

 

 2016            RHYS SOTO S ARNP            Ot            282.7    
        HEMOGLOBINOPATHIES NEC                     

 

 2016            RHYS SOTO S ARNP            Ot            305.1    
        TOBACCO USE DISORDER                     

 

 2016            RHYS SOTO ARNP            Ot            401.9    
        HYPERTENSION NOS                     

 

 2016            RHYS SOTO S ARNP            Ot            238.4    
        POLYCYTHEMIA VERA                     

 

 2016            RHYS SOTO S ARNP            Ot            250.00   
         DIAB FABRICE WO COMPL, TYPE II OR UNSPEC TY                     

 

 2016            RHYS SOTO S ARNP            Ot            282.7    
        HEMOGLOBINOPATHIES NEC                     

 

 2016            RHYS SOTO S ARNP            Ot            305.1    
        TOBACCO USE DISORDER                     

 

 2016            RHYS SOTO S ARNP            Ot            401.9    
        HYPERTENSION NOS                     

 

 2016            RHYS SOTO S ARNP            Ot            238.4    
        POLYCYTHEMIA VERA                     

 

 2016            RHYS SOTO ARNP            Ot            250.00   
         DIAB FABRICE WO COMPL, TYPE II OR UNSPEC TY                     

 

 2016            RHYS SOTO ARNP            Ot            282.7    
        HEMOGLOBINOPATHIES NEC                     

 

 2016            RHYS SOTO ARNP            Ot            305.1    
        TOBACCO USE DISORDER                     

 

 2016            RHYS SOTO ARNP            Ot            401.9    
        HYPERTENSION NOS                     

 

 2016            KATHY MARTIN, KANNAN STATON            Ot            250.92    
        DIAB W UNSPEC COMPL, TYPE II OR UNSPEC T                     

 

 2016            RHYS SOTO ARNP            Ot            238.4    
        POLYCYTHEMIA VERA                     

 

 2016            RHYS SOTO ARNP            Ot            282.7    
        HEMOGLOBINOPATHIES NEC                     

 

 2016            RHYS SOTO ARNP            Ot            723.0    
        CERVICAL SPINAL STENOSIS                     

 

 2016            RHYS SOTO ARNP            Ot            V58.69   
         OTH MED,LT,CURRENT USE                     

 

 2016            RHYS SOTO ARNP            Ot            D45      
      POLYCYTHEMIA VERA                     

 

 2016            RHYS SOTO ARNP            Ot            E11.9    
        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  
          NICOTINE DEPENDENCE, CIGARETTES, UNCOMPL                     

 

 2016            RHYS SOTO ARNP            Ot            I10      
      ESSENTIAL (PRIMARY) HYPERTENSION                     

 

 2016            RHYS SOTO ARNP            Ot            Z79.899  
          OTHER LONG TERM (CURRENT) DRUG THERAPY                     

 

 2016            SACHI PADILLA ARNP            Ot            Z51.81    
        ENCOUNTER FOR THERAPEUTIC DRUG LEVEL MON                     

 

 2016            SACHI PADILLA ARNP            Ot            Z79.01    
        LONG TERM (CURRENT) USE OF ANTICOAGULANT                     

 

 2016            RHYS SOTO ARNP            Ot            D45      
      POLYCYTHEMIA VERA                     

 

 2016            RHYS SOTO ARNP            Ot            E11.9    
        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  
          NICOTINE DEPENDENCE, CIGARETTES, UNCOMPL                     

 

 2016            RHYS SOTO ARNP            Ot            I10      
      ESSENTIAL (PRIMARY) HYPERTENSION                     

 

 2016            RHYS SOTO ARNP            Ot            Z79.899  
          OTHER LONG TERM (CURRENT) DRUG THERAPY                     

 

 2016            RHYS SOTO ARNP            Ot            D45      
      POLYCYTHEMIA VERA                     

 

 2016            RHYS SOTO ARNP            Ot            E11.9    
        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                     

 

 2016            SOTORHYS GOMEZ ARNP            Ot            F17.210  
          NICOTINE DEPENDENCE, CIGARETTES, UNCOMPL                     

 

 2016            SOTORHYS GOMEZ ARNP            Ot            I10      
      ESSENTIAL (PRIMARY) HYPERTENSION                     

 

 2016            SOTO RHYS GOMEZ ARNP            Ot            Z79.01   
         LONG TERM (CURRENT) USE OF ANTICOAGULANT                     

 

 2016            SOTO RHYS GOMEZ ARNP            Ot            Z79.899  
          OTHER LONG TERM (CURRENT) DRUG THERAPY                     

 

 2016            SOTO RHYS GOMEZ ARNP            Ot            D45      
      POLYCYTHEMIA VERA                     

 

 2016            SOTO RHYS GOMEZ ARNP            Ot            E11.9    
        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                     

 

 2016            SOTO RHYS GOMEZ ARNP            Ot            F17.210  
          NICOTINE DEPENDENCE, CIGARETTES, UNCOMPL                     

 

 2016            SOTO RHYS GOMEZ ARNP            Ot            I10      
      ESSENTIAL (PRIMARY) HYPERTENSION                     

 

 2016            SOTO RHYS GOMEZ ARNP            Ot            Z79.01   
         LONG TERM (CURRENT) USE OF ANTICOAGULANT                     

 

 2016            SOTORHYS GOMEZ ARNP            Ot            Z79.899  
          OTHER LONG TERM (CURRENT) DRUG THERAPY                     

 

 2017            RACHEL LEONARDO            Ot            D45            
POLYCYTHEMIA VERA                     

 

 2017            MALISSARACHEL LOYA N            Ot            E11.9          
  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                     

 

 2017            RACHEL LEONARDO N            Ot            F17.210        
    NICOTINE DEPENDENCE, CIGARETTES, UNCOMPL                     

 

 2017            RACHEL LEONARDO N            Ot            I10            
ESSENTIAL (PRIMARY) HYPERTENSION                     

 

 2017            RACHEL LEONARDO N            Ot            Z79.01         
   LONG TERM (CURRENT) USE OF ANTICOAGULANT                     

 

 2017            RACHEL LEONARDO N            Ot            Z79.899        
    OTHER LONG TERM (CURRENT) DRUG THERAPY                     

 

 2017            RACHEL LEONARDO N            Ot            D45            
POLYCYTHEMIA VERA                     

 

 2017            MALISSARACHEL LOYA N            Ot            E11.9          
  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                     

 

 2017            MALISSARACHEL LOYA N            Ot            F17.210        
    NICOTINE DEPENDENCE, CIGARETTES, UNCOMPL                     

 

 2017            MALISSARACHEL LOYA N            Ot            I10            
ESSENTIAL (PRIMARY) HYPERTENSION                     

 

 2017            RACHEL LEONARDO N            Ot            Z79.01         
   LONG TERM (CURRENT) USE OF ANTICOAGULANT                     

 

 2017            RACHEL LEONARDO N            Ot            Z79.899        
    OTHER LONG TERM (CURRENT) DRUG THERAPY                     

 

 2017            RACHEL LEONARDO N            Ot            D45            
POLYCYTHEMIA VERA                     

 

 2017            RACHEL LEONARDO N            Ot            E11.9          
  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                     

 

 2017            RACHEL LEONARDO N            Ot            F17.210        
    NICOTINE DEPENDENCE, CIGARETTES, UNCOMPL                     

 

 2017            RACHEL LEONARDO N            Ot            I10            
ESSENTIAL (PRIMARY) HYPERTENSION                     

 

 2017            RACHEL LEONARDO N            Ot            Z79.01         
   LONG TERM (CURRENT) USE OF ANTICOAGULANT                     

 

 2017            RACHEL LEONARDO N            Ot            Z79.899        
    OTHER LONG TERM (CURRENT) DRUG THERAPY                     

 

 2017            RACHEL LEONARDO N            Ot            D45            
POLYCYTHEMIA VERA                     

 

 2017            RACHEL LEONARDO N            Ot            E11.9          
  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                     

 

 2017            RACHEL LEONARDO N            Ot            F17.210        
    NICOTINE DEPENDENCE, CIGARETTES, UNCOMPL                     

 

 2017            RACHEL LEONARDO N            Ot            I10            
ESSENTIAL (PRIMARY) HYPERTENSION                     

 

 2017            RACHEL LEONARDO N            Ot            Z79.01         
   LONG TERM (CURRENT) USE OF ANTICOAGULANT                     

 

 2017            RACHEL LEONARDO N            Ot            Z79.899        
    OTHER LONG TERM (CURRENT) DRUG THERAPY                     

 

 2017            RACHEL LEONARDO N            Ot            D45            
POLYCYTHEMIA VERA                     

 

 2017            RACHEL LEONARDO N            Ot            E11.9          
  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                     

 

 2017            RACHEL LEONARDO N            Ot            F17.210        
    NICOTINE DEPENDENCE, CIGARETTES, UNCOMPL                     

 

 2017            RACHEL LEONARDO N            Ot            I10            
ESSENTIAL (PRIMARY) HYPERTENSION                     

 

 2017            RACHEL LEONARDO N            Ot            Z79.01         
   LONG TERM (CURRENT) USE OF ANTICOAGULANT                     

 

 2017            RACHEL LEONARDO N            Ot            Z79.899        
    OTHER LONG TERM (CURRENT) DRUG THERAPY                     

 

 2017            RHYS SOTO ARNP            Ot            D45      
      POLYCYTHEMIA VERA                     

 

 2017            RHYS SOTO ARNP            Ot            E11.9    
        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                     

 

 2017            RHYS SOTO ARNP            Ot            F17.210  
          NICOTINE DEPENDENCE, CIGARETTES, UNCOMPL                     

 

 2017            BRITTANY RHYS GOMEZ ARNP            Ot            I10      
      ESSENTIAL (PRIMARY) HYPERTENSION                     

 

 2017            RHYS SOTO ARNP            Ot            Z79.01   
         LONG TERM (CURRENT) USE OF ANTICOAGULANT                     

 

 2017            SOTORHYS GOMEZ ARNP            Ot            Z79.899  
          OTHER LONG TERM (CURRENT) DRUG THERAPY                     

 

 2017            MALISSARACHEL LOYA N            Ot            D45            
POLYCYTHEMIA VERA                     

 

 2017            MALISSARACHEL N            Ot            E11.9          
  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                     

 

 2017            MALISSARACHEL N            Ot            F17.210        
    NICOTINE DEPENDENCE, CIGARETTES, UNCOMPL                     

 

 2017            MALISSARACHEL N            Ot            I10            
ESSENTIAL (PRIMARY) HYPERTENSION                     

 

 2017            MALISSARACHEL LOYA N            Ot            Z79.01         
   LONG TERM (CURRENT) USE OF ANTICOAGULANT                     

 

 2017            RACHEL LEONARDO N            Ot            Z79.899        
    OTHER LONG TERM (CURRENT) DRUG THERAPY                     

 

 2017            RACHEL LEONARDO N            Ot            D45            
POLYCYTHEMIA VERA                     

 

 2017            MALISSA BOBAN N            Ot            E11.9          
  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                     

 

 2017            MALISSARACHEL N            Ot            F17.210        
    NICOTINE DEPENDENCE, CIGARETTES, UNCOMPL                     

 

 2017            MALISSARACHEL LOYA N            Ot            I10            
ESSENTIAL (PRIMARY) HYPERTENSION                     

 

 2017            MALISSARACHEL LOYA N            Ot            Z79.01         
   LONG TERM (CURRENT) USE OF ANTICOAGULANT                     

 

 2017            RACHEL LEONARDO N            Ot            Z79.899        
    OTHER LONG TERM (CURRENT) DRUG THERAPY                     

 

 2017            RACHEL LEONARDO N            Ot            D45            
POLYCYTHEMIA VERA                     

 

 2017            MALISSARACHEL N            Ot            E11.9          
  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                     

 

 2017            MALISSARACHEL N            Ot            F17.210        
    NICOTINE DEPENDENCE, CIGARETTES, UNCOMPL                     

 

 2017            MALISSARACHEL N            Ot            I10            
ESSENTIAL (PRIMARY) HYPERTENSION                     

 

 2017            MALISSARACHEL N            Ot            Z79.01         
   LONG TERM (CURRENT) USE OF ANTICOAGULANT                     

 

 2017            MALISSARACHEL N            Ot            Z79.899        
    OTHER LONG TERM (CURRENT) DRUG THERAPY                     

 

 2017            MALISSARACHEL LOYA N            Ot            D45            
POLYCYTHEMIA VERA                     

 

 2017            RACHEL LEONARDO            Ot            E11.9          
  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                     

 

 2017            RACHEL LEONARDO N            Ot            F17.210        
    NICOTINE DEPENDENCE, CIGARETTES, UNCOMPL                     

 

 2017            RACHEL LEONARDO N            Ot            I10            
ESSENTIAL (PRIMARY) HYPERTENSION                     

 

 2017            RACHEL LEONARDO            Ot            Z79.01         
   LONG TERM (CURRENT) USE OF ANTICOAGULANT                     

 

 2017            RACHEL LEONARDO N            Ot            Z79.899        
    OTHER LONG TERM (CURRENT) DRUG THERAPY                     

 

 2018            RACHEL LEONARDO            Ot            D45            
POLYCYTHEMIA VERA                     

 

 2018            RACHEL LEONARDO N            Ot            E11.9          
  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                     

 

 2018            RACHEL LEONARDO            Ot            F17.210        
    NICOTINE DEPENDENCE, CIGARETTES, UNCOMPL                     

 

 2018            RACHEL LEONARDO N            Ot            I10            
ESSENTIAL (PRIMARY) HYPERTENSION                     

 

 2018            RACHEL LEONARDO N            Ot            Z79.01         
   LONG TERM (CURRENT) USE OF ANTICOAGULANT                     

 

 2018            RACHEL LEONARDO N            Ot            Z79.899        
    OTHER LONG TERM (CURRENT) DRUG THERAPY                     

 

 2018            RACHEL LEONARDO N            Ot            250.00         
   DIAB FABRICE WO COMPL, TYPE II OR UNSPEC TY                     

 

 2018            RACHEL LEONARDO N            Ot            282.7          
  HEMOGLOBINOPATHIES NEC                     

 

 2018            RACHEL LEONARDO N            Ot            305.1          
  TOBACCO USE DISORDER                     

 

 2018            RACHEL LEONARDO N            Ot            401.9          
  HYPERTENSION NOS                     

 

 2018            SOTO, HILAH S ARNP            Ot            238.4    
        POLYCYTHEMIA VERA                     

 

 2018            SOTO, HILAH S ARNP            Ot            250.00   
         DIAB FABRICE WO COMPL, TYPE II OR UNSPEC TY                     

 

 2018            SOTO, HILAH S ARNP            Ot            282.7    
        HEMOGLOBINOPATHIES NEC                     

 

 2018            SOTO, HILAH S ARNP            Ot            305.1    
        TOBACCO USE DISORDER                     

 

 2018            SOTO, HILAH S ARNP            Ot            401.9    
        HYPERTENSION NOS                     

 

 2018            SOTO, HILAH S ARNP            Ot            238.4    
        POLYCYTHEMIA VERA                     

 

 2018            SOTO, HILAH S ARNP            Ot            250.00   
         DIAB FABRICE WO COMPL, TYPE II OR UNSPEC TY                     

 

 2018            SOTO, HILAH S ARNP            Ot            282.7    
        HEMOGLOBINOPATHIES NEC                     

 

 2018            RHYS SOTO S ARNP            Ot            305.1    
        TOBACCO USE DISORDER                     

 

 2018            RHYS SOTO ARNP            Ot            401.9    
        HYPERTENSION NOS                     

 

 2018            RHYS SOTO S ARNP            Ot            238.4    
        POLYCYTHEMIA VERA                     

 

 2018            RHYS SOTO S ARNP            Ot            250.00   
         DIAB FABRICE WO COMPL, TYPE II OR UNSPEC TY                     

 

 2018            RHYS SOTO S ARNP            Ot            282.7    
        HEMOGLOBINOPATHIES NEC                     

 

 2018            RHYS SOTO S ARNP            Ot            305.1    
        TOBACCO USE DISORDER                     

 

 2018            RHYS SOTO S ARNP            Ot            401.9    
        HYPERTENSION NOS                     

 

 2018            KANNAN CHAVEZ MD            Ot            250.92    
        DIAB W UNSPEC COMPL, TYPE II OR UNSPEC T                     

 

 2018            KAVON SOTOCHIQUIS S ARNP            Ot            238.4    
        POLYCYTHEMIA VERA                     

 

 2018            RHYS SOTO S ARNP            Ot            282.7    
        HEMOGLOBINOPATHIES NEC                     

 

 2018            RHYS SOTO ARNP            Ot            723.0    
        CERVICAL SPINAL STENOSIS                     

 

 2018            KAVON SOTOCHIQUIS S ARNP            Ot            V58.69   
         OT MED,LT,CURRENT USE                     

 

 2018            RHYS SOTO ARNP            Ot            D45      
      POLYCYTHEMIA VERA                     

 

 2018            RHYS SOTO ARNP            Ot            E11.9    
        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                     

 

 2018            RHYS SOTO S ARNP            Ot            F17.210  
          NICOTINE DEPENDENCE, CIGARETTES, UNCOMPL                     

 

 2018            RHSY SOTO ARNP            Ot            I10      
      ESSENTIAL (PRIMARY) HYPERTENSION                     

 

 2018            RHYS SOTO ARNP            Ot            Z79.899  
          OTHER LONG TERM (CURRENT) DRUG THERAPY                     

 

 2018            SACHI PADILLA ARNP            Ot            Z51.81    
        ENCOUNTER FOR THERAPEUTIC DRUG LEVEL MON                     

 

 2018            SACHI PADILLA ARNP            Ot            Z79.01    
        LONG TERM (CURRENT) USE OF ANTICOAGULANT                     

 

 2018            RHYS SOTO S ARNP            Ot            D45      
      POLYCYTHEMIA VERA                     

 

 2018            RHYS SOTO S ARNP            Ot            E11.9    
        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                     

 

 2018            RHYS SOTO S ARNP            Ot            F17.210  
          NICOTINE DEPENDENCE, CIGARETTES, UNCOMPL                     

 

 2018            SOTO RHYS GOMEZ ARNP            Ot            I10      
      ESSENTIAL (PRIMARY) HYPERTENSION                     

 

 2018            SOTO RHYS GOMEZ ARNP            Ot            Z79.899  
          OTHER LONG TERM (CURRENT) DRUG THERAPY                     

 

 2018            BRITTANY RHYS GOMEZ ARNP            Ot            D45      
      POLYCYTHEMIA VERA                     

 

 2018            SOTO RHYS GOMEZ ARNP            Ot            E11.9    
        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                     

 

 2018            SOTO RHYS GOMEZ ARNP            Ot            F17.210  
          NICOTINE DEPENDENCE, CIGARETTES, UNCOMPL                     

 

 2018            SOTO RHYS GOMEZ ARNP            Ot            I10      
      ESSENTIAL (PRIMARY) HYPERTENSION                     

 

 2018            BRITTANY RHYS GOMEZ ARNP            Ot            Z79.01   
         LONG TERM (CURRENT) USE OF ANTICOAGULANT                     

 

 2018            BRITTANY RHYS GOMEZ ARNP            Ot            Z79.899  
          OTHER LONG TERM (CURRENT) DRUG THERAPY                     

 

 2018            BRITTANY RHYS GOMEZ ARNP            Ot            D45      
      POLYCYTHEMIA VERA                     

 

 2018            BRITTANY RHYS GOMEZ ARNP            Ot            E11.9    
        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                     

 

 2018            SOTO RHYS GOMEZ ARNP            Ot            F17.210  
          NICOTINE DEPENDENCE, CIGARETTES, UNCOMPL                     

 

 2018            SOTO RHYS GOMEZ ARNP            Ot            I10      
      ESSENTIAL (PRIMARY) HYPERTENSION                     

 

 2018            BRITTANY RHYS GOMEZ ARNP            Ot            Z79.01   
         LONG TERM (CURRENT) USE OF ANTICOAGULANT                     

 

 2018            BRITTANY RHYS GOMEZ ARNP            Ot            Z79.899  
          OTHER LONG TERM (CURRENT) DRUG THERAPY                     

 

 2018            RACHEL LEONARDO            Ot            D45            
POLYCYTHEMIA VERA                     

 

 2018            RACHEL LEONARDO            Ot            E11.9          
  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                     

 

 2018            RACHEL LEONARDO            Ot            F17.210        
    NICOTINE DEPENDENCE, CIGARETTES, UNCOMPL                     

 

 2018            RACHEL LEONARDO            Ot            I10            
ESSENTIAL (PRIMARY) HYPERTENSION                     

 

 2018            RACHEL LEONARDO            Ot            Z79.01         
   LONG TERM (CURRENT) USE OF ANTICOAGULANT                     

 

 2018            RACHEL LEONARDO            Ot            Z79.899        
    OTHER LONG TERM (CURRENT) DRUG THERAPY                     

 

 2018            RACHEL LEONARDO            Ot            250.00         
   DIAB FABRICE WO COMPL, TYPE II OR UNSPEC TY                     

 

 2018            MALISSA, BOBAN N            Ot            282.7          
  HEMOGLOBINOPATHIES NEC                     

 

 2018            RACHEL LEONARDO N            Ot            305.1          
  TOBACCO USE DISORDER                     

 

 2018            RACHEL LEONARDO N            Ot            401.9          
  HYPERTENSION NOS                     

 

 2018            SOTO, HILAH S ARNP            Ot            238.4    
        POLYCYTHEMIA VERA                     

 

 2018            SOTO, HILAH S ARNP            Ot            250.00   
         DIAB FABRICE WO COMPL, TYPE II OR UNSPEC TY                     

 

 2018            SOTO HILAH S ARNP            Ot            282.7    
        HEMOGLOBINOPATHIES NEC                     

 

 2018            SOTO HILAH S ARNP            Ot            305.1    
        TOBACCO USE DISORDER                     

 

 2018            SOTO HILAH S ARNP            Ot            401.9    
        HYPERTENSION NOS                     

 

 2018            SOTO HILAH S ARNP            Ot            238.4    
        POLYCYTHEMIA VERA                     

 

 2018            SOTO HILAH S ARNP            Ot            250.00   
         DIAB FABRICE WO COMPL, TYPE II OR UNSPEC TY                     

 

 2018            SOTO HILAH S ARNP            Ot            282.7    
        HEMOGLOBINOPATHIES NEC                     

 

 2018            SOTO, HILAH S ARNP            Ot            305.1    
        TOBACCO USE DISORDER                     

 

 2018            SOTO, HILAH S ARNP            Ot            401.9    
        HYPERTENSION NOS                     

 

 2018            SOTO HILAH S ARNP            Ot            238.4    
        POLYCYTHEMIA VERA                     

 

 2018            SOTO HILAH S ARNP            Ot            250.00   
         DIAB FABRICE WO COMPL, TYPE II OR UNSPEC TY                     

 

 2018            SOTO, HILAH S ARNP            Ot            282.7    
        HEMOGLOBINOPATHIES NEC                     

 

 2018            KAVON SOTOAH S ARNP            Ot            305.1    
        TOBACCO USE DISORDER                     

 

 2018            SOTO, HILAH S ARNP            Ot            401.9    
        HYPERTENSION NOS                     

 

 2018            KANNAN CHAVEZ MD            Ot            250.92    
        DIAB W UNSPEC COMPL, TYPE II OR UNSPEC T                     

 

 2018            SOTO HILAH S ARNP            Ot            238.4    
        POLYCYTHEMIA VERA                     

 

 2018            SOTO HILAH S ARNP            Ot            282.7    
        HEMOGLOBINOPATHIES NEC                     

 

 2018            SOTO HILAH S ARNP            Ot            723.0    
        CERVICAL SPINAL STENOSIS                     

 

 2018            BRITTANY HILAH S ARNP            Ot            V58.69   
         OTH MED,LT,CURRENT USE                     

 

 2018            SOTO HILAH S ARNP            Ot            D45      
      POLYCYTHEMIA VERA                     

 

 2018            BRITTANY RHYS GOMEZ ARNP            Ot            E11.9    
        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                     

 

 2018            KAVON SOTOCHIQUIS GOMEZ ARNP            Ot            F17.210  
          NICOTINE DEPENDENCE, CIGARETTES, UNCOMPL                     

 

 2018            KAVON SOTOCHIQUIS GOMEZ ARNP            Ot            I10      
      ESSENTIAL (PRIMARY) HYPERTENSION                     

 

 2018            KAVON SOTOCHIQUIS GOMEZ ARNP            Ot            Z79.899  
          OTHER LONG TERM (CURRENT) DRUG THERAPY                     

 

 2018            SACHI PADILLA ARNP            Ot            Z51.81    
        ENCOUNTER FOR THERAPEUTIC DRUG LEVEL MON                     

 

 2018            SACHI PADILLA ARNP            Ot            Z79.01    
        LONG TERM (CURRENT) USE OF ANTICOAGULANT                     

 

 2018            RHYS SOTO ARNP            Ot            D45      
      POLYCYTHEMIA VERA                     

 

 2018            KAVON SOTOCHIQUIS S ARNP            Ot            E11.9    
        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                     

 

 2018            RHYS SOTO ARNP            Ot            F17.210  
          NICOTINE DEPENDENCE, CIGARETTES, UNCOMPL                     

 

 2018            RHYS SOTO ARNP            Ot            I10      
      ESSENTIAL (PRIMARY) HYPERTENSION                     

 

 2018            KAVON SOTOCHIQUIS S ARNP            Ot            Z79.899  
          OTHER LONG TERM (CURRENT) DRUG THERAPY                     

 

 2018            RHYS SOTO ARNP            Ot            D45      
      POLYCYTHEMIA VERA                     

 

 2018            RHYS SOTO ARNP            Ot            E11.9    
        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                     

 

 2018            RHYS SOTO ARNP            Ot            F17.210  
          NICOTINE DEPENDENCE, CIGARETTES, UNCOMPL                     

 

 2018            RHYS SOTO ARNP            Ot            I10      
      ESSENTIAL (PRIMARY) HYPERTENSION                     

 

 2018            RHYS SOTO S ARNP            Ot            Z79.01   
         LONG TERM (CURRENT) USE OF ANTICOAGULANT                     

 

 2018            KAVON SOTOCHIQUIS S ARNP            Ot            Z79.899  
          OTHER LONG TERM (CURRENT) DRUG THERAPY                     

 

 2018            RHYS SOTO ARNP            Ot            D45      
      POLYCYTHEMIA VERA                     

 

 2018            RHYS SOTO ARNP            Ot            E11.9    
        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                     

 

 2018            RHYS SOTO ARNP            Ot            F17.210  
          NICOTINE DEPENDENCE, CIGARETTES, UNCOMPL                     

 

 2018            RHYS SOTO S ARNP            Ot            I10      
      ESSENTIAL (PRIMARY) HYPERTENSION                     

 

 2018            RHYS SOTO ARNP            Ot            Z79.01   
         LONG TERM (CURRENT) USE OF ANTICOAGULANT                     

 

 2018            RHYS SOTO ARNP            Ot            Z79.899  
          OTHER LONG TERM (CURRENT) DRUG THERAPY                     

 

 2018            MALISSA, ABDULKADIRAN N            Ot            D45            
POLYCYTHEMIA VERA                     

 

 2018            MALISSA RACHEL N            Ot            E11.9          
  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                     

 

 2018            MALISSA RACHEL N            Ot            F17.210        
    NICOTINE DEPENDENCE, CIGARETTES, UNCOMPL                     

 

 2018            MALISSA BOBAN N            Ot            I10            
ESSENTIAL (PRIMARY) HYPERTENSION                     

 

 2018            MALISSARACHEL N            Ot            Z79.01         
   LONG TERM (CURRENT) USE OF ANTICOAGULANT                     

 

 2018            MALISSARACHEL N            Ot            Z79.899        
    OTHER LONG TERM (CURRENT) DRUG THERAPY                     

 

 2018            MALISSA BOBAN N            Ot            D45            
POLYCYTHEMIA VERA                     

 

 2018            MALISSARACHEL N            Ot            E11.9          
  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                     

 

 2018            MALISSA BOBAN N            Ot            F17.210        
    NICOTINE DEPENDENCE, CIGARETTES, UNCOMPL                     

 

 2018            MALISSA BOBAN N            Ot            I10            
ESSENTIAL (PRIMARY) HYPERTENSION                     

 

 2018            MALISSARACHEL N            Ot            Z79.01         
   LONG TERM (CURRENT) USE OF ANTICOAGULANT                     

 

 2018            MALISSA BOBAN N            Ot            Z79.899        
    OTHER LONG TERM (CURRENT) DRUG THERAPY                     

 

 06/10/2018            MALISSARACHEL N            Ot            D45            
POLYCYTHEMIA VERA                     

 

 06/10/2018            MALISSARACHEL N            Ot            E11.9          
  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                     

 

 06/10/2018            MALISSA BOBAN N            Ot            F17.210        
    NICOTINE DEPENDENCE, CIGARETTES, UNCOMPL                     

 

 06/10/2018            MALISSA BOBAN N            Ot            I10            
ESSENTIAL (PRIMARY) HYPERTENSION                     

 

 06/10/2018            MALISSARACHEL N            Ot            Z79.01         
   LONG TERM (CURRENT) USE OF ANTICOAGULANT                     

 

 06/10/2018            MALISSA BOBAN N            Ot            Z79.899        
    OTHER LONG TERM (CURRENT) DRUG THERAPY                     

 

 2018            MALISSA BOBAN N            Ot            D45            
POLYCYTHEMIA VERA                     

 

 2018            MALISSA BOBAN N            Ot            E11.9          
  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                     

 

 2018            RACHEL LEONARDO N            Ot            F17.210        
    NICOTINE DEPENDENCE, CIGARETTES, UNCOMPL                     

 

 2018            RACHEL LEONARDO N            Ot            I10            
ESSENTIAL (PRIMARY) HYPERTENSION                     

 

 2018            RACHEL LEONARDO N            Ot            Z79.01         
   LONG TERM (CURRENT) USE OF ANTICOAGULANT                     

 

 2018            RACHEL LEONARDO N            Ot            Z79.899        
    OTHER LONG TERM (CURRENT) DRUG THERAPY                     

 

 2018            RACHEL LEONARDO N            Ot            D45            
POLYCYTHEMIA VERA                     

 

 2018            RACHEL LEONARDO N            Ot            E11.9          
  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                     

 

 2018            RACHEL LEONARDO N            Ot            F17.210        
    NICOTINE DEPENDENCE, CIGARETTES, UNCOMPL                     

 

 2018            RACHEL LEONARDO N            Ot            I10            
ESSENTIAL (PRIMARY) HYPERTENSION                     

 

 2018            RACHEL LEONARDO N            Ot            Z79.01         
   LONG TERM (CURRENT) USE OF ANTICOAGULANT                     

 

 2018            RACHEL LEONARDO N            Ot            Z79.899        
    OTHER LONG TERM (CURRENT) DRUG THERAPY                     

 

 2018            RACHEL LEONARDO N            Ot            250.00         
   DIAB FABRICE WO COMPL, TYPE II OR UNSPEC TY                     

 

 2018            RACHEL LEONARDO N            Ot            282.7          
  HEMOGLOBINOPATHIES NEC                     

 

 2018            RACHEL LEONARDO N            Ot            305.1          
  TOBACCO USE DISORDER                     

 

 2018            RACHEL LEONARDO N            Ot            401.9          
  HYPERTENSION NOS                     

 

 2018            SOTO, HILAH S ARNP            Ot            238.4    
        POLYCYTHEMIA VERA                     

 

 2018            SOTO, HILAH S ARNP            Ot            250.00   
         DIAB FABRICE WO COMPL, TYPE II OR UNSPEC TY                     

 

 2018            SOTO, HILAH S ARNP            Ot            282.7    
        HEMOGLOBINOPATHIES NEC                     

 

 2018            SOTO, HILAH S ARNP            Ot            305.1    
        TOBACCO USE DISORDER                     

 

 2018            SOTO, HILAH S ARNP            Ot            401.9    
        HYPERTENSION NOS                     

 

 2018            SOTO, HILAH S ARNP            Ot            238.4    
        POLYCYTHEMIA VERA                     

 

 2018            SOTO, HILAH S ARNP            Ot            250.00   
         DIAB FABRICE WO COMPL, TYPE II OR UNSPEC TY                     

 

 2018            SOTO, HILAH S ARNP            Ot            282.7    
        HEMOGLOBINOPATHIES NEC                     

 

 2018            SOTO, HILAH S ARNP            Ot            305.1    
        TOBACCO USE DISORDER                     

 

 2018            RHYS SOTO ARNP            Ot            401.9    
        HYPERTENSION NOS                     

 

 2018            RHYS SOTO S ARNP            Ot            238.4    
        POLYCYTHEMIA VERA                     

 

 2018            RHYS SOTO S ARNP            Ot            250.00   
         DIAB FABRICE WO COMPL, TYPE II OR UNSPEC TY                     

 

 2018            KAVON SOTOCHIQUIS S ARNP            Ot            282.7    
        HEMOGLOBINOPATHIES NEC                     

 

 2018            RHYS SOTO S ARNP            Ot            305.1    
        TOBACCO USE DISORDER                     

 

 2018            RHYS SOTO S ARNP            Ot            401.9    
        HYPERTENSION NOS                     

 

 2018            KATHY MARTIN, KANNAN STATON            Ot            250.92    
        DIAB W UNSPEC COMPL, TYPE II OR UNSPEC T                     

 

 2018            RHYS SOTO S ARNP            Ot            238.4    
        POLYCYTHEMIA VERA                     

 

 2018            BRITTANY RHYS S ARNP            Ot            282.7    
        HEMOGLOBINOPATHIES NEC                     

 

 2018            BRITTANY RHYS S ARNP            Ot            723.0    
        CERVICAL SPINAL STENOSIS                     

 

 2018            KAVON SOTOCHIQUIS S ARNP            Ot            V58.69   
         OT MED,LT,CURRENT USE                     

 

 2018            KAVON SOTOCHIQUIS S ARNP            Ot            D45      
      POLYCYTHEMIA VERA                     

 

 2018            KAVON SOTOCHIQUIS S ARNP            Ot            E11.9    
        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                     

 

 2018            RHYS SOTO S ARNP            Ot            F17.210  
          NICOTINE DEPENDENCE, CIGARETTES, UNCOMPL                     

 

 2018            RHYS SOTO ARNP            Ot            I10      
      ESSENTIAL (PRIMARY) HYPERTENSION                     

 

 2018            RHYS SOTO S ARNP            Ot            Z79.899  
          OTHER LONG TERM (CURRENT) DRUG THERAPY                     

 

 2018            SACHI PADILLA ARNP            Ot            Z51.81    
        ENCOUNTER FOR THERAPEUTIC DRUG LEVEL MON                     

 

 2018            SACHI PADILLA ARNP            Ot            Z79.01    
        LONG TERM (CURRENT) USE OF ANTICOAGULANT                     

 

 2018            RHYS SOTO S ARNP            Ot            D45      
      POLYCYTHEMIA VERA                     

 

 2018            BRITTANY RHYS S ARNP            Ot            E11.9    
        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                     

 

 2018            RHYS SOTO S ARNP            Ot            F17.210  
          NICOTINE DEPENDENCE, CIGARETTES, UNCOMPL                     

 

 2018            RHYS SOTO S ARNP            Ot            I10      
      ESSENTIAL (PRIMARY) HYPERTENSION                     

 

 2018            RHYS SOTO ARNP            Ot            Z79.899  
          OTHER LONG TERM (CURRENT) DRUG THERAPY                     

 

 2018            RHYS SOTO ARNP            Ot            D45      
      POLYCYTHEMIA VERA                     

 

 2018            RHYS SOTO ARNP            Ot            E11.9    
        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                     

 

 2018            RHYS SOTO ARNP            Ot            F17.210  
          NICOTINE DEPENDENCE, CIGARETTES, UNCOMPL                     

 

 2018            RHYS SOTO ARNP            Ot            I10      
      ESSENTIAL (PRIMARY) HYPERTENSION                     

 

 2018            RHYS SOTO ARNP            Ot            Z79.01   
         LONG TERM (CURRENT) USE OF ANTICOAGULANT                     

 

 2018            HRYS SOTO ARNP            Ot            Z79.899  
          OTHER LONG TERM (CURRENT) DRUG THERAPY                     

 

 2018            RHYS SOTO ARNP            Ot            D45      
      POLYCYTHEMIA VERA                     

 

 2018            SOTORHYS GOMEZ ARNP            Ot            E11.9    
        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                     

 

 2018            SOTO RHYS GOMEZ ARNP            Ot            F17.210  
          NICOTINE DEPENDENCE, CIGARETTES, UNCOMPL                     

 

 2018            RHYS SOTO ARNP            Ot            I10      
      ESSENTIAL (PRIMARY) HYPERTENSION                     

 

 2018            SOTORHYS GOMEZ ARNP            Ot            Z79.01   
         LONG TERM (CURRENT) USE OF ANTICOAGULANT                     

 

 2018            RHYS SOTO ARNP            Ot            Z79.899  
          OTHER LONG TERM (CURRENT) DRUG THERAPY                     

 

 2018            MALISSARACHEL N            Ot            D45            
POLYCYTHEMIA VERA                     

 

 2018            MALISSARACHEL N            Ot            E11.9          
  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                     

 

 2018            MALISSARACHEL N            Ot            F17.210        
    NICOTINE DEPENDENCE, CIGARETTES, UNCOMPL                     

 

 2018            MALISSA BOBAN N            Ot            I10            
ESSENTIAL (PRIMARY) HYPERTENSION                     

 

 2018            MALISSARACHEL LOYA N            Ot            Z79.01         
   LONG TERM (CURRENT) USE OF ANTICOAGULANT                     

 

 2018            MALISSA, BOBAN N            Ot            Z79.899        
    OTHER LONG TERM (CURRENT) DRUG THERAPY                     

 

 2018            MALISSAABDULKADIR LOYAAN N            Ot            D45            
POLYCYTHEMIA VERA                     

 

 2018            MALISSARACHEL LOYA N            Ot            E11.9          
  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                     

 

 2018            MALISSA, BOBAN N            Ot            F17.210        
    NICOTINE DEPENDENCE, CIGARETTES, UNCOMPL                     

 

 2018            RACHEL LEONARDO            Ot            I10            
ESSENTIAL (PRIMARY) HYPERTENSION                     

 

 2018            RACHEL LEONARDO            Ot            Z79.01         
   LONG TERM (CURRENT) USE OF ANTICOAGULANT                     

 

 2018            RACHEL LEONARDO            Ot            Z79.899        
    OTHER LONG TERM (CURRENT) DRUG THERAPY                     

 

 10/31/2018            RACHEL LEONARDO            Ot            250.00         
   DIAB FABRICE WO COMPL, TYPE II OR UNSPEC TY                     

 

 10/31/2018            RACHEL LEONARDO N            Ot            282.7          
  HEMOGLOBINOPATHIES NEC                     

 

 10/31/2018            RACHEL LEONARDO            Ot            305.1          
  TOBACCO USE DISORDER                     

 

 10/31/2018            RACHEL LEONARDO N            Ot            401.9          
  HYPERTENSION NOS                     

 

 10/31/2018            SOTO, HILAH S ARNP            Ot            238.4    
        POLYCYTHEMIA VERA                     

 

 10/31/2018            SOTO, HILAH S ARNP            Ot            250.00   
         DIAB FABRICE WO COMPL, TYPE II OR UNSPEC TY                     

 

 10/31/2018            SOTO, HILAH S ARNP            Ot            282.7    
        HEMOGLOBINOPATHIES NEC                     

 

 10/31/2018            SOTO, HILAH S ARNP            Ot            305.1    
        TOBACCO USE DISORDER                     

 

 10/31/2018            SOTO, HILAH S ARNP            Ot            401.9    
        HYPERTENSION NOS                     

 

 10/31/2018            SOTO, HILAH S ARNP            Ot            238.4    
        POLYCYTHEMIA VERA                     

 

 10/31/2018            SOTO, HILAH S ARNP            Ot            250.00   
         DIAB FABRICE WO COMPL, TYPE II OR UNSPEC TY                     

 

 10/31/2018            SOTO, HILAH S ARNP            Ot            282.7    
        HEMOGLOBINOPATHIES NEC                     

 

 10/31/2018            SOTO, HILAH S ARNP            Ot            305.1    
        TOBACCO USE DISORDER                     

 

 10/31/2018            SOTO, HILAH S ARNP            Ot            401.9    
        HYPERTENSION NOS                     

 

 10/31/2018            SOTO, HILAH S ARNP            Ot            238.4    
        POLYCYTHEMIA VERA                     

 

 10/31/2018            SOTO, HILAH S ARNP            Ot            250.00   
         DIAB FABRICE WO COMPL, TYPE II OR UNSPEC TY                     

 

 10/31/2018            SOTO, HILAH S ARNP            Ot            282.7    
        HEMOGLOBINOPATHIES NEC                     

 

 10/31/2018            SOTO, HILAH S ARNP            Ot            305.1    
        TOBACCO USE DISORDER                     

 

 10/31/2018            SOTO, HILAH S ARNP            Ot            401.9    
        HYPERTENSION NOS                     

 

 10/31/2018            KANNAN CHAVEZ MD            Ot            250.92    
        DIAB W UNSPEC COMPL, TYPE II OR UNSPEC T                     

 

 10/31/2018            KAVON SOTOCHIQUIS GOMEZ ARNP            Ot            238.4    
        POLYCYTHEMIA VERA                     

 

 10/31/2018            KAVON SOTOCHIQUIS S ARNP            Ot            282.7    
        HEMOGLOBINOPATHIES NEC                     

 

 10/31/2018            KAVON SOTOCHIQUIS S ARNP            Ot            723.0    
        CERVICAL SPINAL STENOSIS                     

 

 10/31/2018            KAVON SOTOCHIQUIS GOMEZ ARNP            Ot            V58.69   
         OTH MED,LT,CURRENT USE                     

 

 10/31/2018            RHYS STOO ARNP            Ot            D45      
      POLYCYTHEMIA VERA                     

 

 10/31/2018            RHYS SOTO ARNP            Ot            E11.9    
        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                     

 

 10/31/2018            RHYS SOTO ARNP            Ot            F17.210  
          NICOTINE DEPENDENCE, CIGARETTES, UNCOMPL                     

 

 10/31/2018            RHYS SOTO ARNP            Ot            I10      
      ESSENTIAL (PRIMARY) HYPERTENSION                     

 

 10/31/2018            RHYS SOTO ARNP            Ot            Z79.899  
          OTHER LONG TERM (CURRENT) DRUG THERAPY                     

 

 10/31/2018            SACHI PADILLA ARNP            Ot            Z51.81    
        ENCOUNTER FOR THERAPEUTIC DRUG LEVEL MON                     

 

 10/31/2018            SACHI PADILLA ARNP            Ot            Z79.01    
        LONG TERM (CURRENT) USE OF ANTICOAGULANT                     

 

 10/31/2018            RHYS SOTO ARNP            Ot            D45      
      POLYCYTHEMIA VERA                     

 

 10/31/2018            RHYS SOTO ARNP            Ot            E11.9    
        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                     

 

 10/31/2018            RHYS SOTO ARNP            Ot            F17.210  
          NICOTINE DEPENDENCE, CIGARETTES, UNCOMPL                     

 

 10/31/2018            RHYS SOTO ARNP            Ot            I10      
      ESSENTIAL (PRIMARY) HYPERTENSION                     

 

 10/31/2018            RHYS SOTO ARNP            Ot            Z79.899  
          OTHER LONG TERM (CURRENT) DRUG THERAPY                     

 

 10/31/2018            RHYS SOTO ARNP            Ot            D45      
      POLYCYTHEMIA VERA                     

 

 10/31/2018            RHYS SOTO ARNP            Ot            E11.9    
        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                     

 

 10/31/2018            RHYS SOTO ARNP            Ot            F17.210  
          NICOTINE DEPENDENCE, CIGARETTES, UNCOMPL                     

 

 10/31/2018            RHYS SOTO ARNP            Ot            I10      
      ESSENTIAL (PRIMARY) HYPERTENSION                     

 

 10/31/2018            RHYS SOTO ARNP            Ot            Z79.01   
         LONG TERM (CURRENT) USE OF ANTICOAGULANT                     

 

 10/31/2018            RHYS SOTO ARNP            Ot            Z79.899  
          OTHER LONG TERM (CURRENT) DRUG THERAPY                     

 

 10/31/2018            RHYS SOTO ARNP            Ot            D45      
      POLYCYTHEMIA VERA                     

 

 10/31/2018            RHYS SOTO S ARNP            Ot            E11.9    
        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                     

 

 10/31/2018            RHYS SOTO S ARNP            Ot            F17.210  
          NICOTINE DEPENDENCE, CIGARETTES, UNCOMPL                     

 

 10/31/2018            RHYS SOTO S ARNP            Ot            I10      
      ESSENTIAL (PRIMARY) HYPERTENSION                     

 

 10/31/2018            RHYS SOTO ARNP            Ot            Z79.01   
         LONG TERM (CURRENT) USE OF ANTICOAGULANT                     

 

 10/31/2018            RHYS SOTO S ARNP            Ot            Z79.899  
          OTHER LONG TERM (CURRENT) DRUG THERAPY                     

 

 2018            RACHEL LEONARDO            Ot            250.00         
   DIAB FABRICE WO COMPL, TYPE II OR UNSPEC TY                     

 

 2018            RACHEL LEONARDO N            Ot            282.7          
  HEMOGLOBINOPATHIES NEC                     

 

 2018            RACHEL LEONARDO N            Ot            305.1          
  TOBACCO USE DISORDER                     

 

 2018            MALISSARACHEL LOYA N            Ot            401.9          
  HYPERTENSION NOS                     

 

 2018            RHYS SOTO S ARNP            Ot            238.4    
        POLYCYTHEMIA VERA                     

 

 2018            RHYS SOTO S ARNP            Ot            250.00   
         DIAB FABRICE WO COMPL, TYPE II OR UNSPEC TY                     

 

 2018            KAVON SOTOAH S ARNP            Ot            282.7    
        HEMOGLOBINOPATHIES NEC                     

 

 2018            KAVON SOTOAH S ARNP            Ot            305.1    
        TOBACCO USE DISORDER                     

 

 2018            KAVON SOTOAH S ARNP            Ot            401.9    
        HYPERTENSION NOS                     

 

 2018            KAVON SOTOAH S ARNP            Ot            238.4    
        POLYCYTHEMIA VERA                     

 

 2018            KAVON SOTOAH S ARNP            Ot            250.00   
         DIAB FABRICE WO COMPL, TYPE II OR UNSPEC TY                     

 

 2018            KAVON SOTOAH S ARNP            Ot            282.7    
        HEMOGLOBINOPATHIES NEC                     

 

 2018            SOTO HILAH S ARNP            Ot            305.1    
        TOBACCO USE DISORDER                     

 

 2018            SOTO, HILAH S ARNP            Ot            401.9    
        HYPERTENSION NOS                     

 

 2018            KAVON SOTOAH S ARNP            Ot            238.4    
        POLYCYTHEMIA VERA                     

 

 2018            SOTO, HILAH S ARNP            Ot            250.00   
         DIAB FABRICE WO COMPL, TYPE II OR UNSPEC TY                     

 

 2018            RHYS SOTO ARNP            Ot            282.7    
        HEMOGLOBINOPATHIES NEC                     

 

 2018            RHYS SOTO ARNP            Ot            305.1    
        TOBACCO USE DISORDER                     

 

 2018            RHYS SOTO ARNP            Ot            401.9    
        HYPERTENSION NOS                     

 

 2018            KATHY MARTIN, KANNAN STATON            Ot            250.92    
        DIAB W UNSPEC COMPL, TYPE II OR UNSPEC T                     

 

 2018            RHYS SOTO ARNP            Ot            238.4    
        POLYCYTHEMIA VERA                     

 

 2018            RHYS SOTO ARNP            Ot            282.7    
        HEMOGLOBINOPATHIES NEC                     

 

 2018            RHYS SOTO ARNP            Ot            723.0    
        CERVICAL SPINAL STENOSIS                     

 

 2018            RHYS SOTO ARNP            Ot            V58.69   
         OTH MED,LT,CURRENT USE                     

 

 2018            RHYS SOTO ARNP            Ot            D45      
      POLYCYTHEMIA VERA                     

 

 2018            RHYS SOTO ARNP            Ot            E11.9    
        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                     

 

 2018            RHYS SOTO ARNP            Ot            F17.210  
          NICOTINE DEPENDENCE, CIGARETTES, UNCOMPL                     

 

 2018            RHYS SOTO ARNP            Ot            I10      
      ESSENTIAL (PRIMARY) HYPERTENSION                     

 

 2018            RHYS SOTO ARNP            Ot            Z79.899  
          OTHER LONG TERM (CURRENT) DRUG THERAPY                     

 

 2018            SACHI PADILLA ARNP            Ot            Z51.81    
        ENCOUNTER FOR THERAPEUTIC DRUG LEVEL MON                     

 

 2018            SACHI PADILLA ARNP            Ot            Z79.01    
        LONG TERM (CURRENT) USE OF ANTICOAGULANT                     

 

 2018            RHYS SOTO ARNP            Ot            D45      
      POLYCYTHEMIA VERA                     

 

 2018            RHYS SOTO ARNP            Ot            E11.9    
        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                     

 

 2018            RHYS SOTO ARNP            Ot            F17.210  
          NICOTINE DEPENDENCE, CIGARETTES, UNCOMPL                     

 

 2018            RHYS SOTO S ARNP            Ot            I10      
      ESSENTIAL (PRIMARY) HYPERTENSION                     

 

 2018            RHYS SOTO ARNP            Ot            Z79.899  
          OTHER LONG TERM (CURRENT) DRUG THERAPY                     

 

 2018            RHYS SOTO ARNP            Ot            D45      
      POLYCYTHEMIA VERA                     

 

 2018            BRITTANY KAVONCHIQUIS S ARNP            Ot            E11.9    
        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                     

 

 2018            BRITTANY RHYS S ARNP            Ot            F17.210  
          NICOTINE DEPENDENCE, CIGARETTES, UNCOMPL                     

 

 2018            SOTO RHYS S ARNP            Ot            I10      
      ESSENTIAL (PRIMARY) HYPERTENSION                     

 

 2018            BRITTANY RHYS S ARNP            Ot            Z79.01   
         LONG TERM (CURRENT) USE OF ANTICOAGULANT                     

 

 2018            BRITTANY RHYS S ARNP            Ot            Z79.899  
          OTHER LONG TERM (CURRENT) DRUG THERAPY                     

 

 2018            BRITTANY RHYS S ARNP            Ot            D45      
      POLYCYTHEMIA VERA                     

 

 2018            BRITTANY RHYS S ARNP            Ot            E11.9    
        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                     

 

 2018            BRITTANY RHYS S ARNP            Ot            F17.210  
          NICOTINE DEPENDENCE, CIGARETTES, UNCOMPL                     

 

 2018            BRITTANY RHYS S ARNP            Ot            I10      
      ESSENTIAL (PRIMARY) HYPERTENSION                     

 

 2018            KAVON SOTOCHIQUIS S ARNP            Ot            Z79.01   
         LONG TERM (CURRENT) USE OF ANTICOAGULANT                     

 

 2018            BRITTANY RHYS S ARNP            Ot            Z79.899  
          OTHER LONG TERM (CURRENT) DRUG THERAPY                     

 

 2018            RACHEL LEONARDO N            Ot            250.00         
   DIAB FABRICE WO COMPL, TYPE II OR UNSPEC TY                     

 

 2018            RACHEL LEONARDO N            Ot            282.7          
  HEMOGLOBINOPATHIES NEC                     

 

 2018            RACHEL LEONARDO N            Ot            305.1          
  TOBACCO USE DISORDER                     

 

 2018            RACHEL LEONARDO N            Ot            401.9          
  HYPERTENSION NOS                     

 

 2018            KAVON SOTOAH S ARNP            Ot            238.4    
        POLYCYTHEMIA VERA                     

 

 2018            KAVON SOTOAH S ARNP            Ot            250.00   
         DIAB FABRICE WO COMPL, TYPE II OR UNSPEC TY                     

 

 2018            KAVON SOTOAH S ARNP            Ot            282.7    
        HEMOGLOBINOPATHIES NEC                     

 

 2018            BRITTANY HILAH S ARNP            Ot            305.1    
        TOBACCO USE DISORDER                     

 

 2018            SOTO HILAH S ARNP            Ot            401.9    
        HYPERTENSION NOS                     

 

 2018            SOTO HILAH S ARNP            Ot            238.4    
        POLYCYTHEMIA VERA                     

 

 2018            SOTO, HILAH S ARNP            Ot            250.00   
         DIAB FABRICE WO COMPL, TYPE II OR UNSPEC TY                     

 

 2018            SOTO, RHYS S ARNP            Ot            282.7    
        HEMOGLOBINOPATHIES NEC                     

 

 2018            SOTO, HILCHIQUIS S ARNP            Ot            305.1    
        TOBACCO USE DISORDER                     

 

 2018            SOTORHYS GOMEZ S ARNP            Ot            401.9    
        HYPERTENSION NOS                     

 

 2018            BRITTANY KAVONCHIQUIS S ARNP            Ot            238.4    
        POLYCYTHEMIA VERA                     

 

 2018            SOTORHYS GOEMZ S ARNP            Ot            250.00   
         DIAB FABRICE WO COMPL, TYPE II OR UNSPEC TY                     

 

 2018            SOTO, RHYS S ARNP            Ot            282.7    
        HEMOGLOBINOPATHIES NEC                     

 

 2018            SOTO, RHYS S ARNP            Ot            305.1    
        TOBACCO USE DISORDER                     

 

 2018            SOTO, HILAH S ARNP            Ot            401.9    
        HYPERTENSION NOS                     

 

 2018            KATHY MARTIN, KANNAN STATON            Ot            250.92    
        DIAB W UNSPEC COMPL, TYPE II OR UNSPEC T                     

 

 2018            SOTO, HILAH S ARNP            Ot            238.4    
        POLYCYTHEMIA VERA                     

 

 2018            BRITTANY RHYS S ARNP            Ot            282.7    
        HEMOGLOBINOPATHIES NEC                     

 

 2018            BRITTANY RHYS S ARNP            Ot            723.0    
        CERVICAL SPINAL STENOSIS                     

 

 2018            BRITTANY RHYS S ARNP            Ot            V58.69   
         OTH MED,LT,CURRENT USE                     

 

 2018            RHYS SOTO S ARNP            Ot            D45      
      POLYCYTHEMIA VERA                     

 

 2018            RHYS SOTO S ARNP            Ot            E11.9    
        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                     

 

 2018            RHYS SOTO S ARNP            Ot            F17.210  
          NICOTINE DEPENDENCE, CIGARETTES, UNCOMPL                     

 

 2018            RHYS SOTO S ARNP            Ot            I10      
      ESSENTIAL (PRIMARY) HYPERTENSION                     

 

 2018            RHYS SOTO S ARNP            Ot            Z79.899  
          OTHER LONG TERM (CURRENT) DRUG THERAPY                     

 

 2018            SACHI PADILLA ARNP            Ot            Z51.81    
        ENCOUNTER FOR THERAPEUTIC DRUG LEVEL MON                     

 

 2018            SACHI PADILLA ARNP            Ot            Z79.01    
        LONG TERM (CURRENT) USE OF ANTICOAGULANT                     

 

 2018            RHSY SOTO S ARNP            Ot            D45      
      POLYCYTHEMIA VERA                     

 

 2018            RHYS SOTO S ARNP            Ot            E11.9    
        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                     

 

 2018            RHYS SOTO ARNP            Ot            F17.210  
          NICOTINE DEPENDENCE, CIGARETTES, UNCOMPL                     

 

 2018            RHYS SOTO ARNP            Ot            I10      
      ESSENTIAL (PRIMARY) HYPERTENSION                     

 

 2018            RHYS SOTO ARNP            Ot            Z79.899  
          OTHER LONG TERM (CURRENT) DRUG THERAPY                     

 

 2018            RHYS SOTO ARNP            Ot            D45      
      POLYCYTHEMIA VERA                     

 

 2018            RHYS SOTO ARNP            Ot            E11.9    
        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                     

 

 2018            RHYS SOTO ARNP            Ot            F17.210  
          NICOTINE DEPENDENCE, CIGARETTES, UNCOMPL                     

 

 2018            RHYS SOTO ARNP            Ot            I10      
      ESSENTIAL (PRIMARY) HYPERTENSION                     

 

 2018            RHYS SOTO ARNP            Ot            Z79.01   
         LONG TERM (CURRENT) USE OF ANTICOAGULANT                     

 

 2018            RHYS SOTO ARNP            Ot            Z79.899  
          OTHER LONG TERM (CURRENT) DRUG THERAPY                     

 

 2018            SOTORHYS GOMEZ ARNP            Ot            D45      
      POLYCYTHEMIA VERA                     

 

 2018            RHYS SOTO ARNP            Ot            E11.9    
        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                     

 

 2018            RHYS SOTO ARNP            Ot            F17.210  
          NICOTINE DEPENDENCE, CIGARETTES, UNCOMPL                     

 

 2018            RHYS SOTO ARNP            Ot            I10      
      ESSENTIAL (PRIMARY) HYPERTENSION                     

 

 2018            RHYS SOTO ARNP            Ot            Z79.01   
         LONG TERM (CURRENT) USE OF ANTICOAGULANT                     

 

 2018            SOTORHYS GOMEZ ARNP            Ot            Z79.899  
          OTHER LONG TERM (CURRENT) DRUG THERAPY                     

 

 2018            JEAN CARLOS WILLETT            Ot            E11.621  
          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                     

 

 2018            JEAN CARLOS WILLETT APREVERARDO            Ot            I70.201  
          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                     

 

 2018            JEAN CARLOS WILLETT            Ot            I70.202  
          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                     

 

 2018            JEAN CARLOS WILLETT            Ot            I87.2    
        VENOUS INSUFFICIENCY (CHRONIC) (PERIPHER                     

 

 2018            JEAN CARLOS WILLETT            Ot            L97.512  
          NON-PRS CHRONIC ULCER OTH PRT RIGHT FOOT                     

 

 2018            TAMIE, JEAN CARLOS R APRN            Ot            L97.522  
          NON-PRS CHRONIC ULCER OTH PRT LEFT FOOT                      

 

 2018            JEAN CARLOS WILLETT APRN            Ot            E11.621  
          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                     

 

 2018            JEAN CARLOS WILLETT APRN            Ot            I70.203  
          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                     

 

 2018            JEAN CARLOS WILLETT APRN            Ot            I87.2    
        VENOUS INSUFFICIENCY (CHRONIC) (PERIPHER                     

 

 2018            JEAN CARLOS WILLETT APRN            Ot            L97.512  
          NON-PRS CHRONIC ULCER OTH PRT RIGHT FOOT                     

 

 2018            JEAN CARLOS WILLETT R APRN            Ot            L97.522  
          NON-PRS CHRONIC ULCER OTH PRT LEFT FOOT                      

 

 2018            JEAN CARLOS WILLETT APRN            Ot            Z98.890  
          OTHER SPECIFIED POSTPROCEDURAL STATES                     

 

 2018            JEAN CAROLS WILLETT APRN            Ot            E11.621  
          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                     

 

 2018            JEAN CARLOS WILLETT APRN            Ot            I70.201  
          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                     

 

 2018            JEAN CARLOS WILLETT R APRN            Ot            I70.202  
          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                     

 

 2018            JEAN CARLOS WILLETT APRN            Ot            I87.2    
        VENOUS INSUFFICIENCY (CHRONIC) (PERIPHER                     

 

 2018            JEAN CARLOS WILLETT APRN            Ot            L97.512  
          NON-PRS CHRONIC ULCER OTH PRT RIGHT FOOT                     

 

 2018            JEAN CARLOS WILLETT R APRN            Ot            L97.522  
          NON-PRS CHRONIC ULCER OTH PRT LEFT FOOT                      

 

 2018            RACHEL LEONARDO            Ot            250.00         
   DIAB FABRICE WO COMPL, TYPE II OR UNSPEC TY                     

 

 2018            RACHEL LEONARDO N            Ot            282.7          
  HEMOGLOBINOPATHIES NEC                     

 

 2018            RACHEL LEONARDO N            Ot            305.1          
  TOBACCO USE DISORDER                     

 

 2018            RACHEL LEONARDO N            Ot            401.9          
  HYPERTENSION NOS                     

 

 2018            RHYS SOTO S ARNP            Ot            238.4    
        POLYCYTHEMIA VERA                     

 

 2018            RHYS SOTO S ARNP            Ot            250.00   
         DIAB FABRICE WO COMPL, TYPE II OR UNSPEC TY                     

 

 2018            RHYS SOTO S ARNP            Ot            282.7    
        HEMOGLOBINOPATHIES NEC                     

 

 2018            RHYS SOTO S ARNP            Ot            305.1    
        TOBACCO USE DISORDER                     

 

 2018            RHYS SOTO S ARNP            Ot            401.9    
        HYPERTENSION NOS                     

 

 2018            RHYS SOTO S ARNP            Ot            238.4    
        POLYCYTHEMIA VERA                     

 

 2018            RHYS SOTO S ARNP            Ot            250.00   
         DIAB FABRICE WO COMPL, TYPE II OR UNSPEC TY                     

 

 2018            RHYS SOTO S ARNP            Ot            282.7    
        HEMOGLOBINOPATHIES NEC                     

 

 2018            RHYS SOTO S ARNP            Ot            305.1    
        TOBACCO USE DISORDER                     

 

 2018            RHYS SOTO S ARNP            Ot            401.9    
        HYPERTENSION NOS                     

 

 2018            RHYS SOTO S ARNP            Ot            238.4    
        POLYCYTHEMIA VERA                     

 

 2018            RHYS SOTO S ARNP            Ot            250.00   
         DIAB FABRICE WO COMPL, TYPE II OR UNSPEC TY                     

 

 2018            RHYS SOTO S ARNP            Ot            282.7    
        HEMOGLOBINOPATHIES NEC                     

 

 2018            RHYS SOTO S ARNP            Ot            305.1    
        TOBACCO USE DISORDER                     

 

 2018            RHYS SOTO S ARNP            Ot            401.9    
        HYPERTENSION NOS                     

 

 2018            KANNAN CHAVEZ MD            Ot            250.92    
        DIAB W UNSPEC COMPL, TYPE II OR UNSPEC T                     

 

 2018            RHYS SOTO S ARNP            Ot            238.4    
        POLYCYTHEMIA VERA                     

 

 2018            RHYS SOTO S ARNP            Ot            282.7    
        HEMOGLOBINOPATHIES NEC                     

 

 2018            RHYS SOTO S ARNP            Ot            723.0    
        CERVICAL SPINAL STENOSIS                     

 

 2018            RHYS SOTO ARNP            Ot            V58.69   
         OT MED,LT,CURRENT USE                     

 

 2018            RHYS SOTO S ARNP            Ot            D45      
      POLYCYTHEMIA VERA                     

 

 2018            RHYS STOO S ARNP            Ot            E11.9    
        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                     

 

 2018            RHYS SOTO S ARNP            Ot            F17.210  
          NICOTINE DEPENDENCE, CIGARETTES, UNCOMPL                     

 

 2018            RHYS SOTO S ARNP            Ot            I10      
      ESSENTIAL (PRIMARY) HYPERTENSION                     

 

 2018            RHYS SOTO ARNP            Ot            Z79.899  
          OTHER LONG TERM (CURRENT) DRUG THERAPY                     

 

 2018            SACHI PADILLA ARNP            Ot            Z51.81    
        ENCOUNTER FOR THERAPEUTIC DRUG LEVEL MON                     

 

 2018            VALERIECONNORJOANN CATHERINE ARNP            Ot            Z79.01    
        LONG TERM (CURRENT) USE OF ANTICOAGULANT                     

 

 2018            RHYS SOTO ARNP            Ot            D45      
      POLYCYTHEMIA VERA                     

 

 2018            RHYS SOTO ARNP            Ot            E11.9    
        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                     

 

 2018            RHYS SOTO ARNP            Ot            F17.210  
          NICOTINE DEPENDENCE, CIGARETTES, UNCOMPL                     

 

 2018            RHYS SOTO ARNP            Ot            I10      
      ESSENTIAL (PRIMARY) HYPERTENSION                     

 

 2018            RHYS SOTO ARNP            Ot            Z79.899  
          OTHER LONG TERM (CURRENT) DRUG THERAPY                     

 

 2018            RHYS SOTO ARNP            Ot            D45      
      POLYCYTHEMIA VERA                     

 

 2018            RHYS SOTO ARNP            Ot            E11.9    
        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                     

 

 2018            RHYS SOTO ARNP            Ot            F17.210  
          NICOTINE DEPENDENCE, CIGARETTES, UNCOMPL                     

 

 2018            RHYS SOTO ARNP            Ot            I10      
      ESSENTIAL (PRIMARY) HYPERTENSION                     

 

 2018            RHYS SOTO ARNP            Ot            Z79.01   
         LONG TERM (CURRENT) USE OF ANTICOAGULANT                     

 

 2018            RHYS SOTO ARNP            Ot            Z79.899  
          OTHER LONG TERM (CURRENT) DRUG THERAPY                     

 

 2018            RHYS SOTO ARNP            Ot            D45      
      POLYCYTHEMIA VERA                     

 

 2018            RHYS SOTO ARNP            Ot            E11.9    
        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                     

 

 2018            RHYS SOTO ARNP            Ot            F17.210  
          NICOTINE DEPENDENCE, CIGARETTES, UNCOMPL                     

 

 2018            RHYS SOTO ARNP            Ot            I10      
      ESSENTIAL (PRIMARY) HYPERTENSION                     

 

 2018            RHYS SOTO ARNP            Ot            Z79.01   
         LONG TERM (CURRENT) USE OF ANTICOAGULANT                     

 

 2018            SOTORHYS GOMEZ ARNP            Ot            Z79.899  
          OTHER LONG TERM (CURRENT) DRUG THERAPY                     

 

 2018            RACHEL LEONARDO            Ot            D45            
POLYCYTHEMIA VERA                     

 

 2018            RACHEL LEONARDO            Ot            E11.9          
  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                     

 

 2018            RACHEL LEONARDO            Ot            F17.210        
    NICOTINE DEPENDENCE, CIGARETTES, UNCOMPL                     

 

 2018            RACHEL LEONARDO EVERARDO            Ot            I10            
ESSENTIAL (PRIMARY) HYPERTENSION                     

 

 2018            RACHEL LEONARDO EVERARDO            Ot            Z79.01         
   LONG TERM (CURRENT) USE OF ANTICOAGULANT                     

 

 2018            RACHEL LEONARDO EVERARDO            Ot            Z79.899        
    OTHER LONG TERM (CURRENT) DRUG THERAPY                     

 

 2018            JEAN CARLOS WILLETT APRN            Ot            E11.621  
          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                     

 

 2018            JEAN CARLOS WILLETT APRN            Ot            I70.201  
          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                     

 

 2018            JEAN CARLOS WILLETT R APRN            Ot            I70.202  
          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                     

 

 2018            TAMIE JEAN CARLOS R APRN            Ot            I87.2    
        VENOUS INSUFFICIENCY (CHRONIC) (PERIPHER                     

 

 2018            JEAN CARLOS WILLETT R APRN            Ot            L97.512  
          NON-PRS CHRONIC ULCER OTH PRT RIGHT FOOT                     

 

 2018            JEAN CARLOS WILLETT R APRN            Ot            L97.522  
          NON-PRS CHRONIC ULCER OTH PRT LEFT FOOT                      

 

 2018            JEAN CARLOS WILLETT R APRN            Ot            E11.621  
          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                     

 

 2018            JEAN CARLOS WILLETT APRN            Ot            I70.203  
          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                     

 

 2018            JEAN CARLOS WILLETT R APRN            Ot            I87.2    
        VENOUS INSUFFICIENCY (CHRONIC) (PERIPHER                     

 

 2018            JEAN CARLOS WILLETT R APRN            Ot            L97.512  
          NON-PRS CHRONIC ULCER OTH PRT RIGHT FOOT                     

 

 2018            JEAN CARLOS WILLETT R APRN            Ot            L97.522  
          NON-PRS CHRONIC ULCER OTH PRT LEFT FOOT                      

 

 2018            JEAN CARLOS WLILETT APRN            Ot            Z98.890  
          OTHER SPECIFIED POSTPROCEDURAL STATES                     

 

 11/15/2018            JEAN CARLOS WILLETT APRN            Ot            E11.621  
          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                     

 

 11/15/2018            JEAN CARLOS WILLETT R APRN            Ot            I70.201  
          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                     

 

 11/15/2018            JEAN CARLOS WILLETT R APRN            Ot            I70.202  
          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                     

 

 11/15/2018            JEAN CARLOS WILLETT R APRN            Ot            I87.2    
        VENOUS INSUFFICIENCY (CHRONIC) (PERIPHER                     

 

 11/15/2018            JEAN CARLOS WILLETT R APRN            Ot            L97.512  
          NON-PRS CHRONIC ULCER OTH PRT RIGHT FOOT                     

 

 11/15/2018            JEAN CARLOS WILLETT R APRN            Ot            L97.522  
          NON-PRS CHRONIC ULCER OTH PRT LEFT FOOT                      

 

 11/15/2018            JEAN CARLOS WILLETT R APRN            Ot            E11.621  
          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                     

 

 11/15/2018            JEAN CARLOS WILLETT R APRN            Ot            I70.201  
          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                     

 

 11/15/2018            JEAN CARLOS WILLETT R APRN            Ot            I70.202  
          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                     

 

 11/15/2018            JEAN CARLOS WILLETT R APRN            Ot            I87.2    
        VENOUS INSUFFICIENCY (CHRONIC) (PERIPHER                     

 

 11/15/2018            JEAN CARLOS WILLETT R APRN            Ot            L97.512  
          NON-PRS CHRONIC ULCER OTH PRT RIGHT FOOT                     

 

 11/15/2018            JEAN CARLOS WILLETT R APRN            Ot            L97.522  
          NON-PRS CHRONIC ULCER OTH PRT LEFT FOOT                      

 

 2018            JEAN CARLOS WILLETT R APRN            Ot            E11.621  
          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                     

 

 2018            JEAN CARLOS WILLETT R APRN            Ot            I70.203  
          [facility] ATHSCL NATIVE ARTERIES OF [facility]                     

 

 2018            JEAN CARLOS WILLETT R APRN            Ot            I87.2    
        VENOUS INSUFFICIENCY (CHRONIC) (PERIPHER                     

 

 2018            JEAN CARLOS WILLETT R APRN            Ot            L03.032  
          CELLULITIS OF LEFT TOE                     

 

 2018            JEAN CARLOS WILLETT APRN            Ot            L97.512  
          NON-PRS CHRONIC ULCER OTH PRT RIGHT FOOT                     

 

 2018            JEAN CARLOS WILLETT R APRN            Ot            L97.522  
          NON-PRS CHRONIC ULCER OTH PRT LEFT FOOT                      

 

 2018            JEAN CARLOS WILLETT R APRN            Ot            M86.8X8  
          OTHER OSTEOMYELITIS, OTHER SITE                     

 

 2018            RACHEL LEONARDO            Ot            250.00         
   DIAB FABRICE WO COMPL, TYPE II OR UNSPEC TY                     

 

 2018            RACHEL LEONARDO            Ot            282.7          
  HEMOGLOBINOPATHIES NEC                     

 

 2018            RACHEL LEONARDO            Ot            305.1          
  TOBACCO USE DISORDER                     

 

 2018            RACHEL LEONARDO N            Ot            401.9          
  HYPERTENSION NOS                     

 

 2018            RHYS SOTO ARNP            Ot            238.4    
        POLYCYTHEMIA VERA                     

 

 2018            RHYS SOTO ARNP            Ot            250.00   
         DIAB FABRICE WO COMPL, TYPE II OR UNSPEC TY                     

 

 2018            RHYS SOTO S ARNP            Ot            282.7    
        HEMOGLOBINOPATHIES NEC                     

 

 2018            RHYS SOTO S ARNP            Ot            305.1    
        TOBACCO USE DISORDER                     

 

 2018            SOTORHYS GOMEZ S ARNP            Ot            401.9    
        HYPERTENSION NOS                     

 

 2018            BRITTANY HILAH S ARNP            Ot            238.4    
        POLYCYTHEMIA VERA                     

 

 2018            SOTO, HILAH S ARNP            Ot            250.00   
         DIAB FABRICE WO COMPL, TYPE II OR UNSPEC TY                     

 

 2018            SOTORHYS GOMEZ S ARNP            Ot            282.7    
        HEMOGLOBINOPATHIES NEC                     

 

 2018            SOTORHYS GOMEZ S ARNP            Ot            305.1    
        TOBACCO USE DISORDER                     

 

 2018            KAVON SOTOAH S ARNP            Ot            401.9    
        HYPERTENSION NOS                     

 

 2018            KAVON SOTOAH S ARNP            Ot            238.4    
        POLYCYTHEMIA VERA                     

 

 2018            SOTO, HILAH S ARNP            Ot            250.00   
         DIAB FABRICE WO COMPL, TYPE II OR UNSPEC TY                     

 

 2018            RHYS SOTO S ARNP            Ot            282.7    
        HEMOGLOBINOPATHIES NEC                     

 

 2018            KAVON SOTOAH S ARNP            Ot            305.1    
        TOBACCO USE DISORDER                     

 

 2018            KAVON SOTOAH S ARNP            Ot            401.9    
        HYPERTENSION NOS                     

 

 2018            KANNAN CHAVEZ MD            Ot            250.92    
        DIAB W UNSPEC COMPL, TYPE II OR UNSPEC T                     

 

 2018            RHYS SOTO S ARNP            Ot            238.4    
        POLYCYTHEMIA VERA                     

 

 2018            RHYS SOTO S ARNP            Ot            282.7    
        HEMOGLOBINOPATHIES NEC                     

 

 2018            RHYS SOTO S ARNP            Ot            723.0    
        CERVICAL SPINAL STENOSIS                     

 

 2018            KAVON SOTOAH S ARNP            Ot            V58.69   
         OTH MED,LT,CURRENT USE                     

 

 2018            RHYS SOTO S ARNP            Ot            D45      
      POLYCYTHEMIA VERA                     

 

 2018            KAVON SOTOAH S ARNP            Ot            E11.9    
        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                     

 

 2018            RHYS SOTO S ARNP            Ot            F17.210  
          NICOTINE DEPENDENCE, CIGARETTES, UNCOMPL                     

 

 2018            RHYS SOTO S ARNP            Ot            I10      
      ESSENTIAL (PRIMARY) HYPERTENSION                     

 

 2018            KAVON SOTOAH S ARNP            Ot            Z79.899  
          OTHER LONG TERM (CURRENT) DRUG THERAPY                     

 

 2018            SACHI PADILLA ARNP            Ot            Z51.81    
        ENCOUNTER FOR THERAPEUTIC DRUG LEVEL MON                     

 

 2018            SACHI PADILLA ARNP            Ot            Z79.01    
        LONG TERM (CURRENT) USE OF ANTICOAGULANT                     

 

 2018            RHYS SOTO ARNP            Ot            D45      
      POLYCYTHEMIA VERA                     

 

 2018            RHYS SOTO ARNP            Ot            E11.9    
        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                     

 

 2018            RHYS SOTO S ARNP            Ot            F17.210  
          NICOTINE DEPENDENCE, CIGARETTES, UNCOMPL                     

 

 2018            KAVON SOTOCHIQUIS S ARNP            Ot            I10      
      ESSENTIAL (PRIMARY) HYPERTENSION                     

 

 2018            RHYS SOTO S ARNP            Ot            Z79.899  
          OTHER LONG TERM (CURRENT) DRUG THERAPY                     

 

 2018            RHYS SOTO ARNP            Ot            D45      
      POLYCYTHEMIA VERA                     

 

 2018            RHYS SOTO S ARNP            Ot            E11.9    
        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                     

 

 2018            RHYS SOTO S ARNP            Ot            F17.210  
          NICOTINE DEPENDENCE, CIGARETTES, UNCOMPL                     

 

 2018            RHYS SOTO S ARNP            Ot            I10      
      ESSENTIAL (PRIMARY) HYPERTENSION                     

 

 2018            RHYS SOTO S ARNP            Ot            Z79.01   
         LONG TERM (CURRENT) USE OF ANTICOAGULANT                     

 

 2018            BRITTANY RHYS S ARNP            Ot            Z79.899  
          OTHER LONG TERM (CURRENT) DRUG THERAPY                     

 

 2018            RHYS SOTO ARNP            Ot            D45      
      POLYCYTHEMIA VERA                     

 

 2018            RHYS SOTO S ARNP            Ot            E11.9    
        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                     

 

 2018            BRITTANY RHYS S ARNP            Ot            F17.210  
          NICOTINE DEPENDENCE, CIGARETTES, UNCOMPL                     

 

 2018            RHYS SOTO S ARNP            Ot            I10      
      ESSENTIAL (PRIMARY) HYPERTENSION                     

 

 2018            RHYS SOTO S ARNP            Ot            Z79.01   
         LONG TERM (CURRENT) USE OF ANTICOAGULANT                     

 

 2018            RHYS SOTO S ARNP            Ot            Z79.899  
          OTHER LONG TERM (CURRENT) DRUG THERAPY                     

 

 2018            RACHEL LEONARDO            Ot            D45            
POLYCYTHEMIA VERA                     

 

 2018            RACHEL LEONARDO            Ot            E11.9          
  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                     

 

 2018            RACHEL LEONARDO            Ot            F17.210        
    NICOTINE DEPENDENCE, CIGARETTES, UNCOMPL                     

 

 2018            RACHEL LEONARDO            Ot            I10            
ESSENTIAL (PRIMARY) HYPERTENSION                     

 

 2018            RACHEL LEONARDO            Ot            Z79.01         
   LONG TERM (CURRENT) USE OF ANTICOAGULANT                     

 

 2018            RACHEL LEONARDO            Ot            Z79.899        
    OTHER LONG TERM (CURRENT) DRUG THERAPY                     

 

 2018            JEAN CARLOS WILLETT APRN            Ot            E11.621  
          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                     

 

 2018            JEAN CARLOS WILLETT APRN            Ot            I70.201  
          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                     

 

 2018            JEAN CARLOS WILLETT APRN            Ot            I70.202  
          [facility] ATHSCL NATIVE ARTERIES OF [facility]                     

 

 2018            JEAN CARLOS WILLETT APRN            Ot            I87.2    
        VENOUS INSUFFICIENCY (CHRONIC) (PERIPHER                     

 

 2018            JEAN CARLOS WILLETT APRN            Ot            L97.512  
          NON-PRS CHRONIC ULCER OTH PRT RIGHT FOOT                     

 

 2018            JEAN CARLOS WILLETT R APRN            Ot            L97.522  
          NON-PRS CHRONIC ULCER OTH PRT LEFT FOOT                      

 

 2018            JEAN CARLOS WILLETT APRN            Ot            E11.621  
          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                     

 

 2018            JEAN CARLOS WILLETT APRN            Ot            I70.203  
          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                     

 

 2018            JEAN CARLOS WILLETT APRN            Ot            I87.2    
        VENOUS INSUFFICIENCY (CHRONIC) (PERIPHER                     

 

 2018            JEAN CARLOS WILLETT APRN            Ot            L97.512  
          NON-PRS CHRONIC ULCER OTH PRT RIGHT FOOT                     

 

 2018            JEAN CARLOS WILLETT R APRN            Ot            L97.522  
          NON-PRS CHRONIC ULCER OTH PRT LEFT FOOT                      

 

 2018            JEAN CARLOS WILLETT APRN            Ot            Z98.890  
          OTHER SPECIFIED POSTPROCEDURAL STATES                     

 

 2018            JEAN CARLOS WILLETT APRN            Ot            E11.621  
          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                     

 

 2018            JEAN CARLOS WILLETT APRN            Ot            I70.201  
          [facility] ATHSCL NATIVE ARTERIES OF [facility]                     

 

 2018            JEAN CARLOS WILLETT APRN            Ot            I70.202  
          [facility] ATHSCL NATIVE ARTERIES OF [facility]                     

 

 2018            TAMIE, JEAN CARLOS R APRN            Ot            I87.2    
        VENOUS INSUFFICIENCY (CHRONIC) (PERIPHER                     

 

 2018            JEAN CARLOS WILLETT R APRN            Ot            L97.512  
          NON-PRS CHRONIC ULCER OTH PRT RIGHT FOOT                     

 

 2018            TAMIE JEAN CARLOS R APRN            Ot            L97.522  
          NON-PRS CHRONIC ULCER OTH PRT LEFT FOOT                      

 

 2018            ANSELMO WILLETTN R APRN            Ot            E11.621  
          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                     

 

 2018            JEAN CARLOS WILLETT R APRN            Ot            I70.203  
          [facility] ATHSCL NATIVE ARTERIES OF [facility]                     

 

 2018            TAMIE JEAN CARLOS R APRN            Ot            I87.2    
        VENOUS INSUFFICIENCY (CHRONIC) (PERIPHER                     

 

 2018            TAMIE JEAN CARLOS R APRN            Ot            L03.032  
          CELLULITIS OF LEFT TOE                     

 

 2018            JEAN CARLOS WILLETT R APRN            Ot            L97.512  
          NON-PRS CHRONIC ULCER OTH PRT RIGHT FOOT                     

 

 2018            TAMIE JEAN CARLOS R APRN            Ot            L97.522  
          NON-PRS CHRONIC ULCER OTH PRT LEFT FOOT                      

 

 2018            JEAN CARLOS WILLETT R APRN            Ot            M86.8X8  
          OTHER OSTEOMYELITIS, OTHER SITE                     

 

 2018            TAMIE JEAN CARLOS R APRN            Ot            E11.621  
          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                     

 

 2018            ANSELMO WILLETTN R APRN            Ot            I70.203  
          [facility] ATHSCL NATIVE ARTERIES OF [facility]                     

 

 2018            TAMIE JEAN CARLOS R APRN            Ot            I87.2    
        VENOUS INSUFFICIENCY (CHRONIC) (PERIPHER                     

 

 2018            JEAN CARLOS WILLETT R APRN            Ot            L03.032  
          CELLULITIS OF LEFT TOE                     

 

 2018            JEAN CARLOS WILLETT R APRN            Ot            L97.512  
          NON-PRS CHRONIC ULCER OTH PRT RIGHT FOOT                     

 

 2018            TAMIE JEAN CARLOS R APRN            Ot            L97.522  
          NON-PRS CHRONIC ULCER OTH PRT LEFT FOOT                      

 

 2018            TAMIE JEAN CARLOS R APRN            Ot            M86.8X8  
          OTHER OSTEOMYELITIS, OTHER SITE                     

 

 2018            ANSELMO WILLETTN R APRN            Ot            E11.621  
          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                     

 

 2018            JEAN CARLOS WILLETT R APRN            Ot            I70.201  
          [facility] ATHSCL NATIVE ARTERIES OF [facility]                     

 

 2018            JEAN CARLOS WILLETT R APRN            Ot            I70.202  
          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                     

 

 2018            JEAN CARLOS WILLETT APRN            Ot            I87.2    
        VENOUS INSUFFICIENCY (CHRONIC) (PERIPHER                     

 

 2018            JEAN CARLOS WILLETT R APRN            Ot            L97.512  
          NON-PRS CHRONIC ULCER OTH PRT RIGHT FOOT                     

 

 2018            JEAN CARLOS WILLETT R APRN            Ot            L97.522  
          NON-PRS CHRONIC ULCER OTH PRT LEFT FOOT                      

 

 2018            JEAN CARLOS WILLETT R APRN            Ot            E11.621  
          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                     

 

 2018            JEAN CARLOS WILLETT R APRN            Ot            I70.203  
          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                     

 

 2018            JEAN CARLOS WILLETT R APRN            Ot            I87.2    
        VENOUS INSUFFICIENCY (CHRONIC) (PERIPHER                     

 

 2018            JEAN CARLOS WILLETT APRN            Ot            L97.512  
          NON-PRS CHRONIC ULCER OTH PRT RIGHT FOOT                     

 

 2018            JEAN CARLOS WILLETT R APRN            Ot            L97.522  
          NON-PRS CHRONIC ULCER OTH PRT LEFT FOOT                      

 

 2018            JEAN CARLOS WILLETT APRN            Ot            Z98.890  
          OTHER SPECIFIED POSTPROCEDURAL STATES                     

 

 2018            JEAN CARLOS WILLETT APRN            Ot            E11.621  
          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                     

 

 2018            JEAN CARLOS WILLETT APRN            Ot            I70.201  
          [facility] ATHSCL NATIVE ARTERIES OF [facility]                     

 

 2018            JEAN CARLOS WILLETT R APRN            Ot            I70.202  
          [facility] ATHSCL NATIVE ARTERIES OF [facility]                     

 

 2018            JEAN CARLOS WILLETT R APRN            Ot            I87.2    
        VENOUS INSUFFICIENCY (CHRONIC) (PERIPHER                     

 

 2018            JEAN CARLOS WILLETT R APRN            Ot            L97.512  
          NON-PRS CHRONIC ULCER OTH PRT RIGHT FOOT                     

 

 2018            JEAN CARLOS WILLETT R APRN            Ot            L97.522  
          NON-PRS CHRONIC ULCER OTH PRT LEFT FOOT                      

 

 2018            JEAN CARLOS WILLETT R APRN            Ot            E11.621  
          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                     

 

 2018            JEAN CARLOS WILLETT APRN            Ot            I70.201  
          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                     

 

 2018            JEAN CARLOS WILLETT APRN            Ot            I70.202  
          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                     

 

 2018            JEAN CARLOS WILLETT R APRN            Ot            I87.2    
        VENOUS INSUFFICIENCY (CHRONIC) (PERIPHER                     

 

 2018            JEAN CARLOS WILLETT            Ot            L97.512  
          NON-PRS CHRONIC ULCER OTH PRT RIGHT FOOT                     

 

 2018            JEAN CARLOS WILLETT            Ot            L97.522  
          NON-PRS CHRONIC ULCER OTH PRT LEFT FOOT                      



                                                                               
                                                                               
                                                                               
                                                                               
                                                                               
                                                                               
                                                                               
                                                                               
                                                                               
                                                                               
                                                                               
                                                                               
                                                                               
                                                                               
                                                                               
                                                                               
                                                                               
                                                                               
                                                                               
                                                                               
                                                                               
                                                                               
                                                                               
                                                                               
                                                                               
                                                                               
    



Procedures

      



There is no data.                  



Results

      





 Test            Result            Range        









 Hemoglobin A1c - 18 11:30         









 Blood hemoglobin A1C measurement (mass/volume)            9.4 %            4.0-
5.6        

 

 MEAN BLOOD GLUCOSE            223 %            <=126        









 Bacteria identification in isolate by anaerobe culture - 18 11:12        
 









 Bacteria identification in isolate by anaerobe culture            NOANA       
      NRG        









 Gram stain microscopy - 18 11:12         









 Gram stain microscopy            No bacteria seen             NRG        









 Bacteria identification in wound by culture - 18 11:12         









 Bacteria identification in wound by culture            [id number]             NRG
        

 

 FREE TEXT EXTERNAL            ID REPORTED 18 11:05             NRG      
  

 

 QUANTITY OF GROWTH            FEW             NRG        

 

 FREE TEXT ENTRY 2            SENSITIVITY REPORTED 11/15/18 15:05             
NRG        

 

 CALL POSITIVES (F1 HELP)            MRSA CALLED TO ANGELA/WC 11/15/18 15:40 BY 
[facility]        









 RML Sensitivity Panel - 18 11:12         









 Oxacillin susceptibility test by minimum inhibitory concentration            R
             NRG        

 

 Clindamycin susceptibility test by minimum inhibitory concentration            
>             NRG        

 

 Erythromycin susceptibility test by minimum inhibitory concentration          
  >             NRG        

 

 Trimethoprim/sulfamethoxazole susceptibility test by minimum 
inhibitoryconcentration            S             NRG        

 

 Vancomycin susceptibility test by minimum inhibitory concentration            
1             NRG        

 

 Levofloxacin susceptibility test by minimum inhibitory concentration          
  >             NRG        

 

 Rifampin susceptibility test by minimum inhibitory concentration            <=
            NRG        

 

 Cefazolin susceptibility test by minimum inhibitory concentration            >
             NRG        

 

 Linezolid susceptibility test by minimum inhibitory concentration            <
=            NRG        

 

 Penicillin G susceptibility test by minimum inhibitory concentration          
  >             NRG        

 

 Minocycline susc SARITHA            <=            NRG        



                        



Encounters

      





 ACCT No.            Visit Date/Time            Discharge            Status    
        Pt. Type            Provider            Facility            Loc./Unit  
          Complaint        

 

 [id number]            2018 09:48:00            2018 23:59:59    
        CLS            Outpatient            JEAN CARLOS WILLETT            
[facility]            [facility]                     

 

 [id number]            2018 09:58:00            2018 23:59:59    
        CLS            Outpatient            JEAN CARLOS WILLETT            
[facility]            [facility]                     

 

 [id number]            2018 12:21:00            2018 23:59:59    
        CLS            Outpatient            ANSELMO WILLETTN R APRN            
[facility]            RAD            TYPE 2 DIABETES 
MELLITUS W/ FOOT ULCER        

 

 [id number]            2018 09:58:00            2018 23:59:59    
        CLS            Outpatient            TAMIE JEAN CARLOS R APRN            
[facility]            [facility]                     

 

 [id number]            2018 11:25:00            2018 23:59:59    
        CLS            Outpatient            TAMIE JEAN CARLOS R APRN            
[facility]            RAD            E11.621        

 

 [id number]            2018 09:25:00            2018 23:59:59    
        CLS            Outpatient            ANSELMO WILLETTN R APRN            
[facility]            [facility]                     

 

 [id number]            10/31/2018 12:46:00            10/31/2018 23:59:59    
        CLS            Outpatient            RACHEL LEONARDO N            [facility]            [facility]                     

 

 [id number]            2018 14:42:00            2018 00:01:00    
        DIS            Outpatient            MALISSA, BOBAN N            [facility]            [facility]                     

 

 [id number]            2018 12:44:00            06/10/2018 00:01:00    
        DIS            Outpatient            MALISSA, BOBAN N            [facility]            [facility]                     

 

 [id number]            2017 12:58:00            2018 00:01:00    
        DIS            Outpatient            MALISSA, BOBAN N            [facility]            [facility]                     

 

 [id number]            2017 14:49:00            2017 00:01:00    
        DIS            Outpatient            MALISSA, BOBAN N            [facility]            [facility]                     

 

 [id number]            2017 12:59:00            2017 00:01:00    
        DIS            Outpatient            MALISSA, BOBAN N            [facility] 
[facility]            ONC                     

 

 [id number]            2016 09:07:00            2017 00:01:00    
        DIS            Outpatient            MALISSA, BOBAN N            [facility] 
[facility]            ONC                     

 

 [id number]            2016 09:56:00            10/10/2016 13:24:00    
        DIS            Outpatient            RACHEL LEONARDO            [facility] 
[facility]            ONC                     

 

 [id number]            2016 09:29:00            2016 23:59:59    
        CLS            Outpatient            RHYS SOTO ARNP            
[facility]            ONC                     

 

 [id number]            2016 11:57:00            2016 14:20:00    
        DIS            Emergency            JUDE CHOI MD            
[facility]            ER            SYNCOPAL EPISODE     
   

 

 [id number]            2016 12:45:00            2016 00:01:00    
        DIS            Outpatient            RACHEL LEONARDO            [facility] 
[facility]            ONC                     

 

 [id number]            2016 11:00:00            2016 23:59:59    
        CLS            Outpatient            RHYS SOTO ARNP            
[facility]            ONC                     

 

 [id number]            2016 09:03:00            2016 23:59:59    
        CLS            Outpatient            RHYS SOTO ARNP            
[facility]            ONC                     

 

 [id number]            2016 10:42:00            2016 23:59:59    
        CLS            Outpatient            SACHI PADILLA ARNP            
[facility]            LAB                     

 

 [id number]            2016 08:23:00            2016 23:59:59    
        CLS            Outpatient            RACHEL LEONARDO            [facility] 
[facility]            ONC                     

 

 [id number]            12/15/2015 09:04:00            12/15/2015 23:59:59    
        CLS            Outpatient            RHYS SOTO ARNP            
[facility]            ONC                     

 

 [id number]            2015 10:19:00            2015 00:01:00    
        DIS            Outpatient            RACHEL LEONARDO            [facility] 
[facility]            ONC                     

 

 [id number]            2015 10:16:00            2015 23:59:59    
        CLS            Outpatient            RHYS SOTO ARNP            
[facility]            ONC                     

 

 [id number]            2015 10:39:00            2015 00:01:00    
        DIS            Outpatient            RACHEL LEONARDO            [facility] 
[facility]            ONC                     

 

 [id number]            2015 12:08:00            2015 23:59:59    
        CLS            Outpatient            KANNAN CHAVEZ MD            
[facility]            LAB                     

 

 [id number]            2015 09:10:00            2015 23:59:59    
        CLS            Outpatient            RHYS SOTO ARNP            
[facility]            ONC                     

 

 [id number]            04/10/2015 16:07:00            2015 00:01:00    
        DIS            Outpatient            RACHEL LEONARDO            [facility] 
[facility]            ONC                     

 

 [id number]            2015 09:05:00            2015 23:59:59    
        CLS            Outpatient            RHYS SOOT ARNP            
[facility]            ONC                     

 

 [id number]            2015 09:19:00            2015 00:01:00    
        DIS            Outpatient            RACHEL LEONARDO            [facility] 
[facility]            ONC                     

 

 [id number]            10/20/2014 14:26:00            10/20/2014 23:59:59    
        CLS            Outpatient            RHYS SOTO ARNP            
[facility]            ONC                     

 

 [id number]            2014 10:05:00            10/19/2014 00:01:00    
        DIS            Outpatient            RACHEL LEONARDO            [facility] 
[facility]            ONC                     

 

 [id number]            2014 09:50:00            2014 00:01:00    
        DIS            Outpatient            RACHEL LEONARDO            [facility] 
[facility]            ONC                     

 

 [id number]            2014 14:29:00            2014 23:59:59    
        CLS            Outpatient            RACHEL LEONARDO            [facility] 
[facility]            FS

## 2018-12-18 NOTE — OPERATIVE REPORT
DATE OF SERVICE:  12/07/2018



SURGEON:

Jay Licona DPM



ASSISTANT:

None.



PREOPERATIVE DIAGNOSIS:

Osteomyelitis of the left second toe.



POSTOPERATIVE DIAGNOSIS:

Osteomyelitis of the left second toe.



PROCEDURE PERFORMED:

Amputation of left second toe with disarticulation at the metatarsophalangeal

joint.



ANESTHESIA:

General anesthesia.



HEMOSTASIS:

Locally controlled.



ESTIMATED BLOOD LOSS:

Minimal.



MATERIALS USED:

3-0 nylon.



INTRAOPERATIVE INJECTABLES:

10 mL of 0.5% Marcaine plain.



COMPLICATIONS:

None.



INDICATIONS:

The patient is a 68-year-old male with a history of osteomyelitis to the left

second toe.  He has exhausted all conservative measures at this and is now

requiring surgical intervention.  He signed consent prior and taken back to the

OR.



DESCRIPTION OF PROCEDURE:

Under mild sedation, the patient was brought to the OR and placed on the

operating table in supine position.  Following administration of general

anesthesia the left lower extremity was prepped and draped in aseptic manner. 

Proper timeout was performed.  The left lower extremity was identified as

surgical site.  Next, two semielliptical incisions were made at the base of the

second toe encompassing the second toe.  The incisions were deepened down to the

level of bone and then dissected proximally down to the level of the

metatarsophalangeal joint.  The metatarsophalangeal joint was then released and

the toe was resected and passed from the surgical field.  The wound was then

flushed with copious amounts of sterile saline.  The skin was reapproximated and

closed using 3-0 nylon and 10 mL of 0.5% Marcaine plain were injected in the

surgical site.  The foot was then dressed with a dry sterile dressing consisting

of 4 x 4's, cast padding and Ace wrap.  The patient tolerated the procedure and

anesthesia well.  He was transferred from OR to recovery with vital signs stable

and neurovascular status intact to the left lower extremity.





Job ID: [id number]

DocumentID: [id number]

Dictated Date:  12/17/2018 21:58:44

Transcription Date: 12/18/2018 06:45:28

Dictated By: JAY LICONA DPM

## 2019-03-18 NOTE — DIAGNOSTIC IMAGING REPORT
INDICATION: Peripheral vascular disease.



Noninvasive study performed with ankle-brachial indices and

segmental blood pressures.



Ankle-brachial index on the right side is 0.92 and on the left

side is 0.94. There appear to be mildly diminished waveforms in

the right calf compared to the left. Digital blood pressures are

diminished especially on the right.



IMPRESSION: There are mildly diminished ankle-brachial indices on

both sides. Digital pressures are diminished on both sides, right

worse than left compatible with small vessel disease.



Dictated by: 



  Dictated on workstation # [id number]

## 2019-07-21 NOTE — NUR
I SPOKE WITH FAMILY ABOUT CODE STATUS. THEY TOLD ME PT IS DNR AND PRODUCED 
PAPER I PLACED WITH PT ADMIT PAPERS.

## 2019-07-21 NOTE — DIAGNOSTIC IMAGING REPORT
EXAMINATION: AP supine portable chest.



INDICATION: Trauma sustained during fall.



COMPARISON: None available.



FINDINGS: There is prominent central vascular congestion, without

overt edema. No focal consolidation is appreciated. There is

bilateral perihilar prominence, more pronounced on the right.

Cardiac size is normal. No pneumothorax or large pleural

effusion. No acute osseous abnormality is appreciated. Surgical

clips are demonstrated in bilateral axillae.



IMPRESSION:

1. Central vascular congestion, without overt edema.

2. Bilateral perihilar prominence of indeterminate etiology or

clinical significance. This could reflect mediastinal

lymphadenopathy or prominent pulmonary vasculature. This could be

further evaluated with a chest CT, as clinically indicated. 

 



Dictated by: 



  Dictated on workstation # [id number]

## 2019-07-21 NOTE — NUR
I JUST CALLED REPORT TO ADMIT NURSE RODNEY. SOMEONE WILL TAKE PT TO ADMIT ROOM 
ASAP. O2 WILL CONTINUE TO THE FLOOR.

## 2019-07-21 NOTE — DIAGNOSTIC IMAGING REPORT
PROCEDURE: CT head and CT cervical spine without contrast.



TECHNIQUE: Multiple contiguous axial images were obtained through

the brain and cervical spine without the use of intravenous

contrast. Sagittal and coronal reformations through the cervical

spine were then performed. Auto Exposure Controls were utilized

during the CT exam to meet ALARA standards for radiation dose

reduction. 



INDICATION: Traumatic head/neck injury sustained during fall.



COMPARISON: None available.



FINDINGS - CT BRAIN: 

BRAIN: There is marked encephalomalacia in the left

frontotemporal region, with associated ex vacuo dilatation of the

left lateral ventricle. There is no parenchymal hemorrhage,

midline shift or mass effect. There is no evidence of acute

territorial infarct. No significant white matter lesions.

Prominence of the ventricles and sulci is consistent with

cortical and cerebellar parenchymal volume loss, commensurate

with age. 



EXTRA-AXIAL SPACES: No subdural or epidural collections.



ORBITS AND PARANASAL SINUSES: Visualized orbits and globes are

intact. There is mucosal thickening or a mucus retention

cyst/polyp that is incompletely imaged in the left maxillary

sinus. There is partial opacification of ethmoid air cells on the

right, posteriorly. Visualized paranasal sinuses and mastoid air

cells are otherwise clear.



CALVARIUM AND SOFT TISSUES: The calvarium is intact. No fractures

or suspicious bony lesions. The extracranial soft tissues are

unremarkable.



FINDINGS - CT CERVICAL SPINE:

SPINE: No fracture or acute osseous abnormality is appreciated.

The patient is status post anterior cervical fusion at the C5-6

level and laminectomy/foraminotomy at the C3-C7 levels. There is

bony fusion across the C5-6 level.



There is straightening of cervical lordosis. No subluxation.

There is severe multilevel degenerative loss of disc height with

endplate osteophytes. No locked or perched facet.



SOFT TISSUES AND LUNG APICES: Soft tissues unremarkable. Minimal

visualization of the lung apices demonstrates no focal

abnormality.





IMPRESSION: - CT BRAIN: 

No acute intracranial pathology. There are chronic changes

consisting of age-related volume loss and encephalomalacia from

prior left MCA territory infarct.



IMPRESSION: - CT CERVICAL SPINE: 

Multilevel postoperative and degenerative changes of the cervical

spine, there is no acute fracture or subluxation.



Dictated by: 



  Dictated on workstation # [id number]

## 2019-07-21 NOTE — XMS REPORT
Continuity of Care Document

                             Created on: 2019



BRYAN MILLS

External Reference #: [id number]

: 1950

Sex: Male



Demographics







                          Address                   [address] [place] [id number]

 

                          Home Phone                [phone] x

 

                          Preferred Language        Unknown

 

                          Marital Status            Unknown

 

                          Restoration Affiliation     Unknown

 

                          Race                      Unknown

 

                          Ethnic Group              Unknown





Author







                          Organization              Unknown

 

                          Address                   Unknown

 

                          Phone                     [phone]



              



Allergies

      





             Active              Description              Code              Type              Severity

                Reaction              Onset              Reported/Identified              Relationship

 to Patient                             Clinical Status        

 

                Yes              Penicillins              [id number]              Drug Allergy        

             Unknown              N/A                             2018                      

                                                 



                  



Medications

      



There is no data.                  



Problems

      





             Date Dx Coded              Attending              Type              Code              Diagnosis

                                        Diagnosed By        

 

             1323              MALISSA BOBAN N              Ot              D45              POLYCYTHEMIA

 VERA                                            

 

                1323              MALISSA, BOBAN N              Ot              Z79.899          

                          OTHER LONG TERM (CURRENT) DRUG THERAPY                       

 

             2014              MALISSA, BOBAN N              Ot              238.4              

POLYCYTHEMIA VERA                                

 

                2014              MALISSA, BOBAN N              Ot              250.00           

                          DIAB FABRICE WO COMPL, TYPE II OR UNSPEC TY                       

 

             2014              MALISSA, BOBAN N              Ot              282.7              

HEMOGLOBINOPATHIES NEC                           

 

             2014              MALISSA, BOBAN N              Ot              305.1              

TOBACCO USE DISORDER                             

 

             2014              MALISSA, BOBAN N              Ot              401.9              

HYPERTENSION NOS                                 

 

             10/19/2014              MALISSA, BOBAN N              Ot              238.4              

POLYCYTHEMIA VERA                                

 

                10/19/2014              MALISSA, BOBAN N              Ot              250.00           

                          DIAB FABRICE WO COMPL, TYPE II OR UNSPEC TY                       

 

             10/19/2014              MALISSA, BOBAN N              Ot              282.7              

HEMOGLOBINOPATHIES NEC                           

 

             10/19/2014              MALISSA, BOBAN N              Ot              305.1              

TOBACCO USE DISORDER                             

 

             10/19/2014              MALISSA, BOBAN N              Ot              401.9              

HYPERTENSION NOS                                 

 

                2014              MALISSA, BOBAN N              Ot              250.00           

                                                             

 

             2014              MALISSA, BOBAN N              Ot              282.7              

                                                 

 

             2014              MALISSA, BOBAN N              Ot              305.1              

                                                 

 

             2014              MALISSA, BOBAN N              Ot              401.9              

                                                 

 

                2014              RHYS SOTO ARNP              Ot              238.4      

                                                             

 

                2014              RHYS SOTO ARNP              Ot              250.00     

                                                             

 

                2014              RHYS SOTO ARNP              Ot              282.7      

                                                             

 

                2014              SOTO, HILAH S ARNP              Ot              305.1      

                                                             

 

                2014              BRITTANY RHYS S ARNP              Ot              401.9      

                                                             

 

             2014              MALISSA, BOBAN N              Ot              238.4              

                                                 

 

                2014              MALISSA, BOBAN N              Ot              250.00           

                                                             

 

             2014              MALISSA, BOBAN N              Ot              282.7              

                                                 

 

             2014              MALISSA, BOBAN N              Ot              305.1              

                                                 

 

             2014              MALISSA, BOBAN N              Ot              401.9              

                                                 

 

                2014              BRITTANY RHYS S ARNP              Ot              238.4      

                                                             

 

                2014              BRITTANY KAVONAH S ARNP              Ot              250.00     

                                                             

 

                2014              SOTO, KAVONAH S ARNP              Ot              282.7      

                                                             

 

                2014              KAVON SOTOCHIQUIS S ARNP              Ot              305.1      

                                                             

 

                2014              KAVON SOTOCHIQUIS S ARNP              Ot              401.9      

                                                             

 

             2014              MALISSA, BOBAN N              Ot              238.4              

                                                 

 

                2014              MALISSA, BOBAN N              Ot              250.00           

                                                             

 

             2014              MALISSA, BOBAN N              Ot              282.7              

                                                 

 

             2014              MALISSA, BOBAN N              Ot              305.1              

                                                 

 

             2014              MALISSA, BOBAN N              Ot              401.9              

                                                 

 

                2014              BRITTANY RHYS S ARNP              Ot              238.4      

                                                             

 

                2014              BRITTANY RHYS S ARNP              Ot              250.00     

                                                             

 

                2014              BRITTANY RHYS S ARNP              Ot              282.7      

                                                             

 

                2014              BRITTANY RHYS S ARNP              Ot              305.1      

                                                             

 

                2014              RHYS SOTO S ARNP              Ot              401.9      

                                                             

 

             12/15/2014              MALISSA, BOBAN N              Ot              238.4              

                                                 

 

                12/15/2014              MALISSA, BOBAN N              Ot              250.00           

                                                             

 

             12/15/2014              MALISSA, BOBAN N              Ot              282.7              

                                                 

 

             12/15/2014              MALISSA, BOBAN N              Ot              305.1              

                                                 

 

             12/15/2014              MALISSA, BOBAN N              Ot              401.9              

                                                 

 

             2015              MALISSA, BOBAN N              Ot              238.4              

POLYCYTHEMIA VERA                                

 

                2015              MALISSA, BOBAN N              Ot              250.00           

                          DIAB FABRICE WO COMPL, TYPE II OR UNSPEC TY                       

 

             2015              MALISSA BOBAN N              Ot              282.7              

HEMOGLOBINOPATHIES NEC                           

 

             2015              MALISSA BOBAN N              Ot              305.1              

TOBACCO USE DISORDER                             

 

             2015              MALISSA, BOBAN N              Ot              401.9              

HYPERTENSION NOS                                 

 

             2015              MALISSA, BOBAN N              Ot              238.4              

                                                 

 

                2015              MALISSA, BOBAN N              Ot              250.00           

                                                             

 

             2015              MALISSA, BOBAN N              Ot              282.7              

                                                 

 

             2015              MALISSA, BOBAN N              Ot              305.1              

                                                 

 

             2015              MALISSA, BOBAN N              Ot              401.9              

                                                 

 

             2015              MALISSA, BOBAN N              Ot              238.4              

                                                 

 

                2015              MALISSA, BOBAN N              Ot              250.00           

                                                             

 

             2015              MALISSA, BOBAN N              Ot              282.7              

                                                 

 

             2015              MALISSA, BOBAN N              Ot              305.1              

                                                 

 

             2015              MALISSA, BOBAN N              Ot              401.9              

                                                 

 

                2015              SOTORHYS S ARNP              Ot              238.4      

                                                             

 

                2015              SOTORHYS GOMEZ S ARNP              Ot              250.00     

                                                             

 

                2015              SOTORHYS GOMEZ S ARNP              Ot              282.7      

                                                             

 

                2015              RHYS SOTO S ARNP              Ot              305.1      

                                                             

 

                2015              RHYS SOTO S ARNP              Ot              401.9      

                                                             

 

             2015              MALISSA, BOBAN N              Ot              238.4              

POLYCYTHEMIA VERA                                

 

                2015              MALISSA, BOBAN N              Ot              250.00           

                          DIAB FABRICE WO COMPL, TYPE II OR UNSPEC TY                       

 

             2015              MALISSA, BOBAN N              Ot              282.7              

HEMOGLOBINOPATHIES NEC                           

 

             2015              MALISSA, BOBAN N              Ot              305.1              

TOBACCO USE DISORDER                             

 

             2015              MALISSA, BOBAN N              Ot              401.9              

HYPERTENSION NOS                                 

 

             2015              MALISSA, BOBAN N              Ot              238.4              

                                                 

 

                2015              MALISSA, BOBAN N              Ot              250.00           

                                                             

 

             2015              MALISSA, BOBAN N              Ot              282.7              

                                                 

 

             2015              MALISSA, BOBAN N              Ot              305.1              

                                                 

 

             2015              MALISSA, BOBAN N              Ot              401.9              

                                                 

 

             2015              MALISSA, BOBAN N              Ot              238.4              

                                                 

 

                2015              MALISSA, BOBAN N              Ot              250.00           

                                                             

 

             2015              MALISSA, BOBAN N              Ot              282.7              

                                                 

 

             2015              MALISSA, BOBAN N              Ot              305.1              

                                                 

 

             2015              MALISSA, BOBAN N              Ot              401.9              

                                                 

 

             2015              MALISSA, BOBAN N              Ot              238.4              

                                                 

 

                2015              MALISSA, BOBAN N              Ot              250.00           

                                                             

 

             2015              MALISSA, BOBAN N              Ot              282.7              

                                                 

 

             2015              MALISSA, BOBAN N              Ot              305.1              

                                                 

 

             2015              MALISSA, BOBAN N              Ot              401.9              

                                                 

 

             2015              MALISSA, ABDULKADIRAN N              Ot              238.4              

                                                 

 

                2015              MALISSA, ABDULKADIRAN N              Ot              250.00           

                                                             

 

             2015              MALISSA, ABDULKADIRAN N              Ot              282.7              

                                                 

 

             2015              MALISSA, BOBAN N              Ot              305.1              

                                                 

 

             2015              MALISSA, BOBAN N              Ot              401.9              

                                                 

 

                2015              BRITTANY HILAH S ARNP              Ot              238.4      

                                                             

 

                2015              KAVON SOTOAH S ARNP              Ot              250.00     

                                                             

 

                2015              SOTO, HILAH S ARNP              Ot              282.7      

                                                             

 

                2015              SOTO, HILAH S ARNP              Ot              305.1      

                                                             

 

                2015              BRITTANY HILAH S ARNP              Ot              401.9      

                                                             

 

                2015              MALISSA, BOBAN N              Ot              250.00           

                                                             

 

             2015              MALISSA, ABDULKADIRAN N              Ot              282.7              

                                                 

 

             2015              MALISSA, ABDULKADIRAN N              Ot              305.1              

                                                 

 

             2015              MALISSA, ABDULKADIRAN N              Ot              401.9              

                                                 

 

                2015              BRITTANY HILAH S ARNP              Ot              238.4      

                                                             

 

                2015              SOTO, HILAH S ARNP              Ot              250.00     

                                                             

 

                2015              SOTO, HILAH S ARNP              Ot              282.7      

                                                             

 

                2015              SOTO, HILAH S ARNP              Ot              305.1      

                                                             

 

                2015              SOTO HILAH S ARNP              Ot              401.9      

                                                             

 

                2015              SOTO HILAH S ARNP              Ot              238.4      

                                                             

 

                2015              SOTO HILAH S ARNP              Ot              250.00     

                                                             

 

                2015              SOTO, HILAH S ARNP              Ot              282.7      

                                                             

 

                2015              SOTO HILAH S ARNP              Ot              305.1      

                                                             

 

                2015              SOTO, HILAH S ARNP              Ot              401.9      

                                                             

 

                2015              SOTO, HILAH S ARNP              Ot              238.4      

                                                             

 

                2015              SOTO HILAH S ARNP              Ot              250.00     

                                                             

 

                2015              SOTORHYS GOMEZ S ARNP              Ot              282.7      

                                                             

 

                2015              SOTORHYS GOMEZ S ARNP              Ot              305.1      

                                                             

 

                2015              SOTORHYS GOMEZ S ARNP              Ot              401.9      

                                                             

 

             2015              RACHEL LEONARDO N              Ot              238.4              

                                                 

 

                2015              MALISSA RACHEL N              Ot              250.00           

                                                             

 

             2015              MALISSA RACHEL N              Ot              282.7              

                                                 

 

             2015              MALISSA RACHEL N              Ot              305.1              

                                                 

 

             2015              MALISSARACHEL LOYA N              Ot              401.9              

                                                 

 

                2015              KANNAN CHAVEZ MD              Ot              250.92      

                                                             

 

                2015              SOTO RHYS S ARNP              Ot              238.4      

                                                             

 

                2015              SOTO RHYS S ARNP              Ot              250.00     

                                                             

 

                2015              SOTORHYS GOMEZ S ARNP              Ot              282.7      

                                                             

 

                2015              SOTO RHYS S ARNP              Ot              305.1      

                                                             

 

                2015              SOTO RHYS S ARNP              Ot              401.9      

                                                             

 

                2015              SOTO RHYS S ARNP              Ot              238.4      

                                                             

 

                2015              SOTO RHYS S ARNP              Ot              250.00     

                                                             

 

                2015              SOTORHYS GOMEZ S ARNP              Ot              282.7      

                                                             

 

                2015              SOTO RHYS S ARNP              Ot              305.1      

                                                             

 

                2015              SOTORHYS GOMEZ S ARNP              Ot              401.9      

                                                             

 

                2015              KANNAN CHAVEZ MD              Ot              250.92      

                                                             

 

                2015              BRITTANY RHYS S ARNP              Ot              238.4      

                                                             

 

                2015              SOTO RHYS S ARNP              Ot              250.00     

                                                             

 

                2015              SOTORHYS S ARNP              Ot              282.7      

                                                             

 

                2015              SOTO RHYS S ARNP              Ot              305.1      

                                                             

 

                2015              BRITTANY RHYS S ARNP              Ot              401.9      

                                                             

 

                2015              KANNAN CHAVEZ MD              Ot              250.92      

                                                             

 

             2015              RACHEL LEONARDO N              Ot              238.4              

POLYCYTHEMIA VERA                                

 

                2015              RACHEL LEONARDO N              Ot              250.00           

                          DIAB FABRICE WO COMPL, TYPE II OR UNSPEC TY                       

 

             2015              RACHEL LEONARDO N              Ot              282.7              

HEMOGLOBINOPATHIES NEC                           

 

             2015              MALISSA, BOBAN N              Ot              305.1              

TOBACCO USE DISORDER                             

 

             2015              MALISSA, BOBAN N              Ot              401.9              

HYPERTENSION NOS                                 

 

                2015              RHYS SOTO S ARNP              Ot              238.4      

                                                             

 

                2015              RHYS SOTO S ARNP              Ot              250.00     

                                                             

 

                2015              RHYS SOTO S ARNP              Ot              282.7      

                                                             

 

                2015              RHYS SOTO S ARNP              Ot              305.1      

                                                             

 

                2015              RHYS SOTO S ARNP              Ot              401.9      

                                                             

 

                2015              KANNAN CHAVEZ MD              Ot              250.92      

                                                             

 

                2015              KANNAN CHAVEZ MD              Ot              250.92      

                                                             

 

             2015              MALISSA, BOBAN N              Ot              238.4              

                                                 

 

                2015              MALISSA, BOBAN N              Ot              250.00           

                                                             

 

             2015              MALISSA, BOBAN N              Ot              282.7              

                                                 

 

             2015              MALISSA, BOBAN N              Ot              305.1              

                                                 

 

             2015              MALISSA, BOBAN N              Ot              401.9              

                                                 

 

             2015              MALISSA, BOBAN N              Ot              D45                

                                                 

 

             2015              MALISSA, BOBAN N              Ot              E11.9              

                                                 

 

                2015              MALISSA, BOBAN N              Ot              F17.200          

                                                             

 

             2015              MALISSA, BOBAN N              Ot              I10                

                                                 

 

             2015              MALISSA, BOBAN N              Ot              238.4              

POLYCYTHEMIA VERA                                

 

                2015              MALISSA, BOBAN N              Ot              250.00           

                          DIAB FABRICE WO COMPL, TYPE II OR UNSPEC TY                       

 

             2015              MALISSA, BOBAN N              Ot              282.7              

HEMOGLOBINOPATHIES NEC                           

 

             2015              MALISSA, BOBAN N              Ot              305.1              

TOBACCO USE DISORDER                             

 

             2015              MALISSA, BOBAN N              Ot              401.9              

HYPERTENSION NOS                                 

 

                10/14/2015              RHYS SOTO S ARNP              Ot              238.4      

                                                             

 

                10/14/2015              RHYS SOTO S ARNP              Ot              282.7      

                                                             

 

                10/14/2015              RHYS SOTO S ARNP              Ot              723.0      

                                                             

 

                10/14/2015              RHYS SOTO S ARNP              Ot              V58.69     

                                                             

 

                2015              RHYS SOTO S ARNP              Ot              238.4      

                                                             

 

                2015              RHYS SOTO S ARNP              Ot              250.00     

                                                             

 

                2015              RHYS SOTO S ARNP              Ot              282.7      

                                                             

 

                2015              BRITTANY RHYS GOMEZ ARNP              Ot              305.1      

                                                             

 

                2015              BRITTANYRHYS ARNP              Ot              401.9      

                                                             

 

             2015              RACHEL LEONARDO N              Ot              238.4              

                                                 

 

                2015              MALISSAABDULKADIR LOYAAN N              Ot              250.00           

                                                             

 

             2015              MALISSAABDULKADIR LOYAAN N              Ot              282.7              

                                                 

 

             2015              MALISSA, BOBAN N              Ot              305.1              

                                                 

 

             2015              MALISSA, BOBAN N              Ot              401.9              

                                                 

 

             2015              MALISSAABDULKADIR LOYAAN N              Ot              D45                

                                                 

 

             2015              MALISSARACHEL LOYA N              Ot              E11.9              

                                                 

 

                2015              MALISSAABDULKADIR LOYAAN N              Ot              F17.200          

                                                             

 

             2015              ABDULKADIR LEONARDOAN N              Ot              I10                

                                                 

 

                2016              RHYS SOTO ARNP              Ot              D45        

                                                             

 

                2016              RHYS SOTO ARNP              Ot              E11.9      

                                                             

 

                2016              RHYS SOTO ARNP              Ot              F17.210    

                                                             

 

                2016              BRITTANY RHYS GOMEZ ARNP              Ot              I10        

                                                             

 

                2016              RHYS SOTO S ARNP              Ot              Z79.899    

                                                             

 

             2016              RACHEL LEONARDO N              Ot              D45              POLYCYTHEMIA

 VERA                                            

 

                2016              ABDULKADIR LEONARDOAN N              Ot              Z79.899          

                          OTHER LONG TERM (CURRENT) DRUG THERAPY                       

 

             2016              ABDULKADIR LEONARDOAN N              Ot              D45                

                                                 

 

                2016              MALISSAABDULKADIR LOYAAN N              Ot              Z79.899          

                                                             

 

             2016              ABDULKADIR LEONARDOAN N              Ot              D45                

                                                 

 

                2016              MALISSA BOBAN N              Ot              Z79.899          

                                                             

 

                2016              SACHI PADILLA ARNP              Ot              Z51.81      

                                                             

 

                2016              SACHI PADILLA ARNP              Ot              Z79.01      

                                                             

 

                2016              SACHI PADILLA ARNP              Ot              Z51.81      

                          ENCOUNTER FOR THERAPEUTIC DRUG LEVEL MON                       

 

                2016              SACHI PADILLA ARNP              Ot              Z79.01      

                          LONG TERM (CURRENT) USE OF ANTICOAGULANT                       

 

                2016              RHYS SOTO ARNP              Ot              D45        

                          POLYCYTHEMIA VERA                       

 

                2016              RHYS SOTO ARNP              Ot              E11.9      

                          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                       

 

                2016              RHYS SOTO ARNP              Ot              F17.210    

                          NICOTINE DEPENDENCE, CIGARETTES, UNCOMPL                       

 

                2016              RHYS SOTO ARNP              Ot              I10        

                          ESSENTIAL (PRIMARY) HYPERTENSION                       

 

                2016              RHYS SOTO ARNP              Ot              Z79.899    

                          OTHER LONG TERM (CURRENT) DRUG THERAPY                       

 

                2016              EADORAMONA SACHI K ARNP              Ot              Z51.81      

                          ENCOUNTER FOR THERAPEUTIC DRUG LEVEL MON                       

 

                2016              EADORAMONA SACHI K ARNP              Ot              Z79.01      

                          LONG TERM (CURRENT) USE OF ANTICOAGULANT                       

 

                2016              EADORAMONA SACHI K ARNP              Ot              Z51.81      

                          ENCOUNTER FOR THERAPEUTIC DRUG LEVEL MON                       

 

                2016              EADORAMONA SACHI K ARNP              Ot              Z79.01      

                          LONG TERM (CURRENT) USE OF ANTICOAGULANT                       

 

                2016              EADORAMONA SACHI FLORIN ARNP              Ot              Z51.81      

                          ENCOUNTER FOR THERAPEUTIC DRUG LEVEL MON                       

 

                2016              EADORAMONA SACHI K ARNP              Ot              Z79.01      

                          LONG TERM (CURRENT) USE OF ANTICOAGULANT                       

 

                2016              RHYS SOTOP              Ot              D45        

                          POLYCYTHEMIA VERA                       

 

                2016              RHYS SOTO ARNP              Ot              E11.9      

                          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                       

 

                2016              RHYS SOTO ARNP              Ot              F17.210    

                          NICOTINE DEPENDENCE, CIGARETTES, UNCOMPL                       

 

                2016              RHYS SOTOP              Ot              I10        

                          ESSENTIAL (PRIMARY) HYPERTENSION                       

 

                2016              RHYS SOTO ARNP              Ot              Z79.899    

                          OTHER LONG TERM (CURRENT) DRUG THERAPY                       

 

                2016              RHYS SOTOP              Ot              D45        

                          POLYCYTHEMIA VERA                       

 

                2016              RHYS SOTO ARNP              Ot              E11.9      

                          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                       

 

                2016              RHYS SOTO ARNP              Ot              F17.210    

                          NICOTINE DEPENDENCE, CIGARETTES, UNCOMPL                       

 

                2016              RHYS SOTO ARNP              Ot              I10        

                          ESSENTIAL (PRIMARY) HYPERTENSION                       

 

                2016              RHYS SOTO ARNP              Ot              Z79.01     

                          LONG TERM (CURRENT) USE OF ANTICOAGULANT                       

 

                2016              RHYS SOTO ARNP              Ot              Z79.899    

                          OTHER LONG TERM (CURRENT) DRUG THERAPY                       

 

                2016              RHYS SOTO ARNP              Ot              D45        

                          POLYCYTHEMIA VERA                       

 

                2016              RHYS SOTO ARNP              Ot              E11.9      

                          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                       

 

                2016              RHYS SOTO ARNP              Ot              F17.210    

                          NICOTINE DEPENDENCE, CIGARETTES, UNCOMPL                       

 

                2016              RHYS SOTO ARNP              Ot              I10        

                          ESSENTIAL (PRIMARY) HYPERTENSION                       

 

                2016              SOTO RHYS GOMEZ ARNP              Ot              Z79.01     

                          LONG TERM (CURRENT) USE OF ANTICOAGULANT                       

 

                2016              KAVON SOTOCHIQUIS GOMEZ ARNP              Ot              Z79.899    

                          OTHER LONG TERM (CURRENT) DRUG THERAPY                       

 

                2016              RHYS SOTO ARNP              Ot              D45        

                          POLYCYTHEMIA VERA                       

 

                2016              RHYS SOTO ARNP              Ot              E11.9      

                          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                       

 

                2016              RHYS SOTO ARNP              Ot              F17.210    

                          NICOTINE DEPENDENCE, CIGARETTES, UNCOMPL                       

 

                2016              SOTORHYS GOMEZ ARNP              Ot              I10        

                          ESSENTIAL (PRIMARY) HYPERTENSION                       

 

                2016              KAVON SOTOCHIQUIS GOMEZ ARNP              Ot              Z79.01     

                          LONG TERM (CURRENT) USE OF ANTICOAGULANT                       

 

                2016              KAVON SOTOCHIQUIS GOMEZ ARNP              Ot              Z79.899    

                          OTHER LONG TERM (CURRENT) DRUG THERAPY                       

 

             2016              MALISSARACHEL LOYA N              Ot              D45              POLYCYTHEMIA

 VERA                                            

 

                2016              MALISSARACHEL LOYA N              Ot              Z79.899          

                          OTHER LONG TERM (CURRENT) DRUG THERAPY                       

 

             2016              MALISSARACHEL LOYA N              Ot              D45              POLYCYTHEMIA

 VERA                                            

 

                2016              MALISSARACHEL LOYA N              Ot              Z79.899          

                          OTHER LONG TERM (CURRENT) DRUG THERAPY                       

 

                2016              BRITTANY RHYS GOMEZ ARNP              Ot              Z79.01     

                          LONG TERM (CURRENT) USE OF ANTICOAGULANT                       

 

             2016              MALISSARACHEL LOYA N              Ot              D45              POLYCYTHEMIA

 VERA                                            

 

                2016              MALISSARACHEL LOYA N              Ot              Z79.899          

                          OTHER LONG TERM (CURRENT) DRUG THERAPY                       

 

                2016              RHYS SOTO S ARNP              Ot              Z79.01     

                          LONG TERM (CURRENT) USE OF ANTICOAGULANT                       

 

                2016              JUDE CHOI MD              Ot              I44.60    

                          UNSPECIFIED FASCICULAR BLOCK                       

 

                2016              JUDE CHOI MD              Ot              I45.10    

                          UNSPECIFIED RIGHT BUNDLE-BRANCH BLOCK                       

 

                2016              STEPH MARTIN, JUDE STATON              Ot              R55       

                          SYNCOPE AND COLLAPSE                       

 

                2016              MALISSA, RACHEL N              Ot              250.00           

                          DIAB FABRICE WO COMPL, TYPE II OR UNSPEC TY                       

 

             2016              MALISSAABDULKADIR LOYALYNN N              Ot              282.7              

HEMOGLOBINOPATHIES NEC                           

 

             2016              RACHEL LEONARDO N              Ot              305.1              

TOBACCO USE DISORDER                             

 

             2016              MALISSA RACHEL N              Ot              401.9              

HYPERTENSION NOS                                 

 

                2016              SOTO, HILAH S ARNP              Ot              238.4      

                          POLYCYTHEMIA VERA                       

 

                2016              SOTO, HILAH S ARNP              Ot              250.00     

                          DIAB FABRICE WO COMPL, TYPE II OR UNSPEC TY                       

 

                2016              SOTO, HILAH S ARNP              Ot              282.7      

                          HEMOGLOBINOPATHIES NEC                       

 

                2016              SOTO, HILAH S ARNP              Ot              305.1      

                          TOBACCO USE DISORDER                       

 

                2016              SOTO, HILAH S ARNP              Ot              401.9      

                          HYPERTENSION NOS                       

 

                2016              SOTO, HILAH S ARNP              Ot              238.4      

                          POLYCYTHEMIA VERA                       

 

                2016              SOTO, HILAH S ARNP              Ot              250.00     

                          DIAB FABRICE WO COMPL, TYPE II OR UNSPEC TY                       

 

                2016              SOTO, HILAH S ARNP              Ot              282.7      

                          HEMOGLOBINOPATHIES NEC                       

 

                2016              SOTO, HILAH S ARNP              Ot              305.1      

                          TOBACCO USE DISORDER                       

 

                2016              SOTO, HILAH S ARNP              Ot              401.9      

                          HYPERTENSION NOS                       

 

                2016              SOTO, HILAH S ARNP              Ot              238.4      

                          POLYCYTHEMIA VERA                       

 

                2016              SOTO, HILAH S ARNP              Ot              250.00     

                          DIAB FABRICE WO COMPL, TYPE II OR UNSPEC TY                       

 

                2016              SOTO, HILAH S ARNP              Ot              282.7      

                          HEMOGLOBINOPATHIES NEC                       

 

                2016              SOTO, HILAH S ARNP              Ot              305.1      

                          TOBACCO USE DISORDER                       

 

                2016              SOTO, HILAH S ARNP              Ot              401.9      

                          HYPERTENSION NOS                       

 

                2016              KATHY MARITN, KANNAN STATON              Ot              250.92      

                          DIAB W UNSPEC COMPL, TYPE II OR UNSPEC T                       

 

                2016              SOTO, HILAH S ARNP              Ot              238.4      

                          POLYCYTHEMIA VERA                       

 

                2016              SOTO, HILAH S ARNP              Ot              282.7      

                          HEMOGLOBINOPATHIES NEC                       

 

                2016              RHYS SOTO ARNP              Ot              723.0      

                          CERVICAL SPINAL STENOSIS                       

 

                2016              RHYS SOTO ARNP              Ot              V58.69     

                          OT MED,LT,CURRENT USE                       

 

                2016              RHYS SOTO ARNP              Ot              D45        

                          POLYCYTHEMIA VERA                       

 

                2016              RHYS SOTO ARNP              Ot              E11.9      

                          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                       

 

                2016              RHYS SOTO ARNP              Ot              F17.210    

                          NICOTINE DEPENDENCE, CIGARETTES, UNCOMPL                       

 

                2016              RHYS SOTO ARNP              Ot              I10        

                          ESSENTIAL (PRIMARY) HYPERTENSION                       

 

                2016              RHYS SOTO ARNP              Ot              Z79.899    

                          OTHER LONG TERM (CURRENT) DRUG THERAPY                       

 

                2016              SACHI PADILLA ARNP              Ot              Z51.81      

                          ENCOUNTER FOR THERAPEUTIC DRUG LEVEL MON                       

 

                2016              SACHI PADILLA ARNP              Ot              Z79.01      

                          LONG TERM (CURRENT) USE OF ANTICOAGULANT                       

 

                2016              SOTORHYS GOMEZ ARNP              Ot              D45        

                          POLYCYTHEMIA VERA                       

 

                2016              RHYS SOTO ARNP              Ot              E11.9      

                          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                       

 

                2016              RHYS SOTO ARNP              Ot              F17.210    

                          NICOTINE DEPENDENCE, CIGARETTES, UNCOMPL                       

 

                2016              RHYS SOTO ARNP              Ot              I10        

                          ESSENTIAL (PRIMARY) HYPERTENSION                       

 

                2016              RHYS SOTO ARNP              Ot              Z79.899    

                          OTHER LONG TERM (CURRENT) DRUG THERAPY                       

 

                2016              SOTO RHYS GOMEZ ARNP              Ot              D45        

                          POLYCYTHEMIA VERA                       

 

                2016              RHYS SOTO ARNP              Ot              E11.9      

                          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                       

 

                2016              RHYS SOTO ARNP              Ot              F17.210    

                          NICOTINE DEPENDENCE, CIGARETTES, UNCOMPL                       

 

                2016              SOTORHYS GOMEZ ARNP              Ot              I10        

                          ESSENTIAL (PRIMARY) HYPERTENSION                       

 

                2016              SOTORHYS ARNP              Ot              Z79.01     

                          LONG TERM (CURRENT) USE OF ANTICOAGULANT                       

 

                2016              BRITTANYRHYS S ARNP              Ot              Z79.899    

                          OTHER LONG TERM (CURRENT) DRUG THERAPY                       

 

             2016              RACHEL LEONARDO              Ot              D45              POLYCYTHEMIA

 VERA                                            

 

                2016              RACHEL LEONARDO EVERARDO              Ot              Z79.899          

                          OTHER LONG TERM (CURRENT) DRUG THERAPY                       

 

                2016              RHYS SOTO ARNP              Ot              Z79.01     

                          LONG TERM (CURRENT) USE OF ANTICOAGULANT                       

 

             2016              RACHEL LEONARDO N              Ot              D45              POLYCYTHEMIA

 VERA                                            

 

                2016              RACHEL LEONARDO EVERARDO              Ot              Z79.899          

                          OTHER LONG TERM (CURRENT) DRUG THERAPY                       

 

             2016              RACHEL LEONARDO EVERARDO              Ot              D45              POLYCYTHEMIA

 VERA                                            

 

                2016              RACHEL LEONARDO EVERARDO              Ot              Z79.899          

                          OTHER LONG TERM (CURRENT) DRUG THERAPY                       

 

                2016              RHYS SOTO S ARNP              Ot              D45        

                          POLYCYTHEMIA VERA                       

 

                2016              RHYS SOTO S ARNP              Ot              E11.9      

                          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                       

 

                2016              RHYS SOTO S ARNP              Ot              F17.210    

                          NICOTINE DEPENDENCE, CIGARETTES, UNCOMPL                       

 

                2016              RHYS SOTO S ARNP              Ot              I10        

                          ESSENTIAL (PRIMARY) HYPERTENSION                       

 

                2016              RHYS SOTO S ARNP              Ot              Z79.01     

                          LONG TERM (CURRENT) USE OF ANTICOAGULANT                       

 

                2016              RHYS SOTO S ARNP              Ot              Z79.899    

                          OTHER LONG TERM (CURRENT) DRUG THERAPY                       

 

                2016              STEPH MARTIN, JUDE STATON              Ot              I44.60    

                          UNSPECIFIED FASCICULAR BLOCK                       

 

                2016              JUDE CHOI MD              Ot              I45.10    

                          UNSPECIFIED RIGHT BUNDLE-BRANCH BLOCK                       

 

                2016              JUDE CHOI MD              Ot              R55       

                          SYNCOPE AND COLLAPSE                       

 

                2016              RHYS SOTO S ARNP              Ot              D45        

                          POLYCYTHEMIA VERA                       

 

                2016              RHYS SOTO ARNP              Ot              E11.9      

                          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                       

 

                2016              RHYS SOTO S ARNP              Ot              F17.210    

                          NICOTINE DEPENDENCE, CIGARETTES, UNCOMPL                       

 

                2016              RHYS SOTO S ARNP              Ot              I10        

                          ESSENTIAL (PRIMARY) HYPERTENSION                       

 

                2016              RHYS SOTO S ARNP              Ot              Z79.01     

                          LONG TERM (CURRENT) USE OF ANTICOAGULANT                       

 

                2016              RHYS SOTO S ARNP              Ot              Z79.899    

                          OTHER LONG TERM (CURRENT) DRUG THERAPY                       

 

                10/10/2016              RACHEL LEONARDO N              Ot              250.00           

                          DIAB FABRICE WO COMPL, TYPE II OR UNSPEC TY                       

 

             10/10/2016              RACHEL LEONARDO N              Ot              282.7              

HEMOGLOBINOPATHIES NEC                           

 

             10/10/2016              RACHEL LEONARDO N              Ot              305.1              

TOBACCO USE DISORDER                             

 

             10/10/2016              RACHEL LEONARDO N              Ot              401.9              

HYPERTENSION NOS                                 

 

                10/10/2016              SOTO HILAH S ARNP              Ot              238.4      

                          POLYCYTHEMIA VERA                       

 

                10/10/2016              SOTO, HILAH S ARNP              Ot              250.00     

                          DIAB FABRICE WO COMPL, TYPE II OR UNSPEC TY                       

 

                10/10/2016              SOTO HILAH S ARNP              Ot              282.7      

                          HEMOGLOBINOPATHIES NEC                       

 

                10/10/2016              SOTO HILAH S ARNP              Ot              305.1      

                          TOBACCO USE DISORDER                       

 

                10/10/2016              SOTO HILAH S ARNP              Ot              401.9      

                          HYPERTENSION NOS                       

 

                10/10/2016              SOTO, HILAH S ARNP              Ot              238.4      

                          POLYCYTHEMIA VERA                       

 

                10/10/2016              SOTO HILAH S ARNP              Ot              250.00     

                          DIAB FABRICE WO COMPL, TYPE II OR UNSPEC TY                       

 

                10/10/2016              SOTOKAVONAH S ARNP              Ot              282.7      

                          HEMOGLOBINOPATHIES NEC                       

 

                10/10/2016              SOTOKAVONAH S ARNP              Ot              305.1      

                          TOBACCO USE DISORDER                       

 

                10/10/2016              SOTOKAVONAH S ARNP              Ot              401.9      

                          HYPERTENSION NOS                       

 

                10/10/2016              SOTOKAVONAH S ARNP              Ot              238.4      

                          POLYCYTHEMIA VERA                       

 

                10/10/2016              SOTOKAVONAH S ARNP              Ot              250.00     

                          DIAB FABRICE WO COMPL, TYPE II OR UNSPEC TY                       

 

                10/10/2016              SOTOKAVONAH S ARNP              Ot              282.7      

                          HEMOGLOBINOPATHIES NEC                       

 

                10/10/2016              SOTO HILAH S ARNP              Ot              305.1      

                          TOBACCO USE DISORDER                       

 

                10/10/2016              SOTO HILAH S ARNP              Ot              401.9      

                          HYPERTENSION NOS                       

 

                10/10/2016              KANNAN CHAVEZ MD              Ot              250.92      

                          DIAB W UNSPEC COMPL, TYPE II OR UNSPEC T                       

 

                10/10/2016              SOTO HILAH S ARNP              Ot              238.4      

                          POLYCYTHEMIA VERA                       

 

                10/10/2016              SOTO HILAH S ARNP              Ot              282.7      

                          HEMOGLOBINOPATHIES NEC                       

 

                10/10/2016              SOTORHYS GOMEZ S ARNP              Ot              723.0      

                          CERVICAL SPINAL STENOSIS                       

 

                10/10/2016              KAVON SOTOAH S ARNP              Ot              V58.69     

                          OTH MED,LT,CURRENT USE                       

 

                10/10/2016              RHYS SOTO S ARNP              Ot              D45        

                          POLYCYTHEMIA VERA                       

 

                10/10/2016              RHYS SOTO ARNP              Ot              E11.9      

                          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                       

 

                10/10/2016              RHYS SOTO S ARNP              Ot              F17.210    

                          NICOTINE DEPENDENCE, CIGARETTES, UNCOMPL                       

 

                10/10/2016              RHYS SOTO S ARNP              Ot              I10        

                          ESSENTIAL (PRIMARY) HYPERTENSION                       

 

                10/10/2016              SOTORHYS GOMEZ ARNP              Ot              Z79.899    

                          OTHER LONG TERM (CURRENT) DRUG THERAPY                       

 

                10/10/2016              SACHI PADILLA ARNP              Ot              Z51.81      

                          ENCOUNTER FOR THERAPEUTIC DRUG LEVEL MON                       

 

                10/10/2016              SACHI PADILLA ARNP              Ot              Z79.01      

                          LONG TERM (CURRENT) USE OF ANTICOAGULANT                       

 

                10/10/2016              RHYS SOTO ARNP              Ot              D45        

                          POLYCYTHEMIA VERA                       

 

                10/10/2016              SOTORHYS GOMEZ ARNP              Ot              E11.9      

                          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                       

 

                10/10/2016              RHYS SOTO ARNP              Ot              F17.210    

                          NICOTINE DEPENDENCE, CIGARETTES, UNCOMPL                       

 

                10/10/2016              RHYS SOTO ARNP              Ot              I10        

                          ESSENTIAL (PRIMARY) HYPERTENSION                       

 

                10/10/2016              RHYS SOTO ARNP              Ot              Z79.899    

                          OTHER LONG TERM (CURRENT) DRUG THERAPY                       

 

                10/10/2016              RHYS SOTO ARNP              Ot              D45        

                          POLYCYTHEMIA VERA                       

 

                10/10/2016              RHYS SOTO ARNP              Ot              E11.9      

                          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                       

 

                10/10/2016              RHYS SOTO ARNP              Ot              F17.210    

                          NICOTINE DEPENDENCE, CIGARETTES, UNCOMPL                       

 

                10/10/2016              RHYS SOTO ARNP              Ot              I10        

                          ESSENTIAL (PRIMARY) HYPERTENSION                       

 

                10/10/2016              SOTORHYS GOMEZ S ARNP              Ot              Z79.01     

                          LONG TERM (CURRENT) USE OF ANTICOAGULANT                       

 

                10/10/2016              BRITTANYRHYS S ARNP              Ot              Z79.899    

                          OTHER LONG TERM (CURRENT) DRUG THERAPY                       

 

             10/10/2016              RACHEL LEONARDO              Ot              D45              POLYCYTHEMIA

 VERA                                            

 

                10/10/2016              RACHEL LEONARDO              Ot              Z79.899          

                          OTHER LONG TERM (CURRENT) DRUG THERAPY                       

 

                10/10/2016              BRITTANY HILAH S ARNP              Ot              D45        

                          POLYCYTHEMIA VERA                       

 

                10/10/2016              RHYS SOTO ARNP              Ot              E11.9      

                          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                       

 

                10/10/2016              RHYS SOTO ARNP              Ot              F17.210    

                          NICOTINE DEPENDENCE, CIGARETTES, UNCOMPL                       

 

                10/10/2016              RHYS SOTO ARNP              Ot              I10        

                          ESSENTIAL (PRIMARY) HYPERTENSION                       

 

                10/10/2016              RHYS SOTO ARNP              Ot              Z79.01     

                          LONG TERM (CURRENT) USE OF ANTICOAGULANT                       

 

                10/10/2016              RHYS SOTO ARNP              Ot              Z79.899    

                          OTHER LONG TERM (CURRENT) DRUG THERAPY                       

 

             10/10/2016              MALISSARACHEL N              Ot              D45              POLYCYTHEMIA

 VERA                                            

 

                10/10/2016              MALISSARACHEL N              Ot              Z79.899          

                          OTHER LONG TERM (CURRENT) DRUG THERAPY                       

 

                2016              MALISSA RACHEL N              Ot              250.00           

                          DIAB FABRICE WO COMPL, TYPE II OR UNSPEC TY                       

 

             2016              MALISSARACHEL N              Ot              282.7              

HEMOGLOBINOPATHIES NEC                           

 

             2016              MALISSARACHEL N              Ot              305.1              

TOBACCO USE DISORDER                             

 

             2016              MALISSARACHEL N              Ot              401.9              

HYPERTENSION NOS                                 

 

                2016              RHYS SOTO S ARNP              Ot              238.4      

                          POLYCYTHEMIA VERA                       

 

                2016              RHYS SOTO S ARNP              Ot              250.00     

                          DIAB FABRICE WO COMPL, TYPE II OR UNSPEC TY                       

 

                2016              RHYS SOTO S ARNP              Ot              282.7      

                          HEMOGLOBINOPATHIES NEC                       

 

                2016              RHYS SOTO S ARNP              Ot              305.1      

                          TOBACCO USE DISORDER                       

 

                2016              RHYS SOTO S ARNP              Ot              401.9      

                          HYPERTENSION NOS                       

 

                2016              RHYS SOTO S ARNP              Ot              238.4      

                          POLYCYTHEMIA VERA                       

 

                2016              RHYS SOTO S ARNP              Ot              250.00     

                          DIAB FABRICE WO COMPL, TYPE II OR UNSPEC TY                       

 

                2016              RHYS SOTO S ARNP              Ot              282.7      

                          HEMOGLOBINOPATHIES NEC                       

 

                2016              RHYS SOTO S ARNP              Ot              305.1      

                          TOBACCO USE DISORDER                       

 

                2016              RHYS SOTO S ARNP              Ot              401.9      

                          HYPERTENSION NOS                       

 

                2016              RHYS SOTO S ARNP              Ot              238.4      

                          POLYCYTHEMIA VERA                       

 

                2016              SOTO, HILAH S ARNP              Ot              250.00     

                          DIAB FABRICE WO COMPL, TYPE II OR UNSPEC TY                       

 

                2016              RHYS SOTO ARNP              Ot              282.7      

                          HEMOGLOBINOPATHIES NEC                       

 

                2016              RHYS SOTO ARNP              Ot              305.1      

                          TOBACCO USE DISORDER                       

 

                2016              RHYS SOTO ARNP              Ot              401.9      

                          HYPERTENSION NOS                       

 

                2016              KATHY MARTIN, KANNAN STATON              Ot              250.92      

                          DIAB W UNSPEC COMPL, TYPE II OR UNSPEC T                       

 

                2016              RHYS SOTO ARNP              Ot              238.4      

                          POLYCYTHEMIA VERA                       

 

                2016              RHYS SOTO ARNP              Ot              282.7      

                          HEMOGLOBINOPATHIES NEC                       

 

                2016              RHYS SOTO ARNP              Ot              723.0      

                          CERVICAL SPINAL STENOSIS                       

 

                2016              RHYS SOTO ARNP              Ot              V58.69     

                          OTH MED,LT,CURRENT USE                       

 

                2016              RHYS SOTO ARNP              Ot              D45        

                          POLYCYTHEMIA VERA                       

 

                2016              RHYS SOTO ARNP              Ot              E11.9      

                          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                       

 

                2016              RHYS SOTO ARNP              Ot              F17.210    

                          NICOTINE DEPENDENCE, CIGARETTES, UNCOMPL                       

 

                2016              RHYS SOTO ARNP              Ot              I10        

                          ESSENTIAL (PRIMARY) HYPERTENSION                       

 

                2016              RHYS SOTO ARNP              Ot              Z79.899    

                          OTHER LONG TERM (CURRENT) DRUG THERAPY                       

 

                2016              SACHI PADILLA ARNP              Ot              Z51.81      

                          ENCOUNTER FOR THERAPEUTIC DRUG LEVEL MON                       

 

                2016              SACHI PADILLA ARNP              Ot              Z79.01      

                          LONG TERM (CURRENT) USE OF ANTICOAGULANT                       

 

                2016              RHYS SOTO ARNP              Ot              D45        

                          POLYCYTHEMIA VERA                       

 

                2016              RHYS SOTO ARNP              Ot              E11.9      

                          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                       

 

                2016              RHYS SOTO ARNP              Ot              F17.210    

                          NICOTINE DEPENDENCE, CIGARETTES, UNCOMPL                       

 

                2016              RHYS SOTO ARNP              Ot              I10        

                          ESSENTIAL (PRIMARY) HYPERTENSION                       

 

                2016              RHYS SOTO ARNP              Ot              Z79.899    

                          OTHER LONG TERM (CURRENT) DRUG THERAPY                       

 

                2016              RHYS SOTO ARNP              Ot              D45        

                          POLYCYTHEMIA VERA                       

 

                2016              SOTO RHYS GOMEZ ARNP              Ot              E11.9      

                          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                       

 

                2016              SOTORHYS GOMEZ ARNP              Ot              F17.210    

                          NICOTINE DEPENDENCE, CIGARETTES, UNCOMPL                       

 

                2016              RHYS SOTO ARNP              Ot              I10        

                          ESSENTIAL (PRIMARY) HYPERTENSION                       

 

                2016              SOTO RHYS GOMEZ ARNP              Ot              Z79.01     

                          LONG TERM (CURRENT) USE OF ANTICOAGULANT                       

 

                2016              BRITTANY RHYS GOMEZ ARNP              Ot              Z79.899    

                          OTHER LONG TERM (CURRENT) DRUG THERAPY                       

 

                2016              SOTO RHYS GOMEZ ARNP              Ot              D45        

                          POLYCYTHEMIA VERA                       

 

                2016              SOTO RHYS GOMEZ ARNP              Ot              E11.9      

                          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                       

 

                2016              SOTO RHYS GOMEZ ARNP              Ot              F17.210    

                          NICOTINE DEPENDENCE, CIGARETTES, UNCOMPL                       

 

                2016              SOTO RHYS GOMEZ ARNP              Ot              I10        

                          ESSENTIAL (PRIMARY) HYPERTENSION                       

 

                2016              BRITTANY RHYS GOMEZ ARNP              Ot              Z79.01     

                          LONG TERM (CURRENT) USE OF ANTICOAGULANT                       

 

                2016              SOTORHYS GOMEZ ARNP              Ot              Z79.899    

                          OTHER LONG TERM (CURRENT) DRUG THERAPY                       

 

             2017              RACHEL LEONARDO              Ot              D45              POLYCYTHEMIA

 VERA                                            

 

             2017              MALISSARACHEL LOYA N              Ot              E11.9              

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                       

 

                2017              MALISSARACHEL LYOA N              Ot              F17.210          

                          NICOTINE DEPENDENCE, CIGARETTES, UNCOMPL                       

 

             2017              MALISSARACHEL LOYA N              Ot              I10              ESSENTIAL

 (PRIMARY) HYPERTENSION                          

 

                2017              RACHEL LEONARDO N              Ot              Z79.01           

                          LONG TERM (CURRENT) USE OF ANTICOAGULANT                       

 

                2017              RACHEL LEONARDO N              Ot              Z79.899          

                          OTHER LONG TERM (CURRENT) DRUG THERAPY                       

 

             2017              RACHEL LEONARDO N              Ot              D45              POLYCYTHEMIA

 VERA                                            

 

             2017              MALISSARACHEL LOYA N              Ot              E11.9              

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                       

 

                2017              MALISSARACHEL LOYA N              Ot              F17.210          

                          NICOTINE DEPENDENCE, CIGARETTES, UNCOMPL                       

 

             2017              MALISSARACHEL LOYA N              Ot              I10              ESSENTIAL

 (PRIMARY) HYPERTENSION                          

 

                2017              RACHEL LEONARDO N              Ot              Z79.01           

                          LONG TERM (CURRENT) USE OF ANTICOAGULANT                       

 

                2017              RACHEL LEONARDO N              Ot              Z79.899          

                          OTHER LONG TERM (CURRENT) DRUG THERAPY                       

 

             2017              RACHEL LEONARDO N              Ot              D45              POLYCYTHEMIA

 VERA                                            

 

             2017              RACHEL LEONARDO N              Ot              E11.9              

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                       

 

                2017              RACHEL LEONARDO N              Ot              F17.210          

                          NICOTINE DEPENDENCE, CIGARETTES, UNCOMPL                       

 

             2017              RACHEL LEONARDO N              Ot              I10              ESSENTIAL

 (PRIMARY) HYPERTENSION                          

 

                2017              RACHEL LEONARDO N              Ot              Z79.01           

                          LONG TERM (CURRENT) USE OF ANTICOAGULANT                       

 

                2017              RACHEL LEONARDO N              Ot              Z79.899          

                          OTHER LONG TERM (CURRENT) DRUG THERAPY                       

 

             2017              RACHEL LEONARDO N              Ot              D45              POLYCYTHEMIA

 VERA                                            

 

             2017              RACHEL LEONARDO N              Ot              E11.9              

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                       

 

                2017              RACHEL LEONARDO N              Ot              F17.210          

                          NICOTINE DEPENDENCE, CIGARETTES, UNCOMPL                       

 

             2017              RACHEL LEONARDO N              Ot              I10              ESSENTIAL

 (PRIMARY) HYPERTENSION                          

 

                2017              RACHEL LEONARDO N              Ot              Z79.01           

                          LONG TERM (CURRENT) USE OF ANTICOAGULANT                       

 

                2017              RACHEL LEONARDO N              Ot              Z79.899          

                          OTHER LONG TERM (CURRENT) DRUG THERAPY                       

 

             2017              RACHEL LEONARDO N              Ot              D45              POLYCYTHEMIA

 VERA                                            

 

             2017              RACHEL LEONARDO N              Ot              E11.9              

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                       

 

                2017              RACHEL LEONARDO N              Ot              F17.210          

                          NICOTINE DEPENDENCE, CIGARETTES, UNCOMPL                       

 

             2017              RACHEL LEONARDO N              Ot              I10              ESSENTIAL

 (PRIMARY) HYPERTENSION                          

 

                2017              RACHEL LEONARDO N              Ot              Z79.01           

                          LONG TERM (CURRENT) USE OF ANTICOAGULANT                       

 

                2017              RACHEL LEONARDO N              Ot              Z79.899          

                          OTHER LONG TERM (CURRENT) DRUG THERAPY                       

 

                2017              RHYS SOTO ARNP              Ot              D45        

                          POLYCYTHEMIA VERA                       

 

                2017              RHYS SOTO ARNP              Ot              E11.9      

                          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                       

 

                2017              RHYS SOTO ARNP              Ot              F17.210    

                          NICOTINE DEPENDENCE, CIGARETTES, UNCOMPL                       

 

                2017              RHYS SOTO ARNP              Ot              I10        

                          ESSENTIAL (PRIMARY) HYPERTENSION                       

 

                2017              RHYS SOTO ARNP              Ot              Z79.01     

                          LONG TERM (CURRENT) USE OF ANTICOAGULANT                       

 

                2017              RHYS SOTO ARNP              Ot              Z79.899    

                          OTHER LONG TERM (CURRENT) DRUG THERAPY                       

 

             2017              MALISSAABDULKADIRAN N              Ot              D45              POLYCYTHEMIA

 VERA                                            

 

             2017              MALISSARACHEL N              Ot              E11.9              

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                       

 

                2017              MALISSA BOBAN N              Ot              F17.210          

                          NICOTINE DEPENDENCE, CIGARETTES, UNCOMPL                       

 

             2017              MALISSA BOBAN N              Ot              I10              ESSENTIAL

 (PRIMARY) HYPERTENSION                          

 

                2017              MALISSARACHEL N              Ot              Z79.01           

                          LONG TERM (CURRENT) USE OF ANTICOAGULANT                       

 

                2017              MALISSA BOBAN N              Ot              Z79.899          

                          OTHER LONG TERM (CURRENT) DRUG THERAPY                       

 

             2017              MALISSARACHEL N              Ot              D45              POLYCYTHEMIA

 VERA                                            

 

             2017              MALISSARACHEL N              Ot              E11.9              

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                       

 

                2017              MALISSAABDULKADIRAN N              Ot              F17.210          

                          NICOTINE DEPENDENCE, CIGARETTES, UNCOMPL                       

 

             2017              MALISSA BOBLYNN N              Ot              I10              ESSENTIAL

 (PRIMARY) HYPERTENSION                          

 

                2017              MALISSARACHEL N              Ot              Z79.01           

                          LONG TERM (CURRENT) USE OF ANTICOAGULANT                       

 

                2017              MALISSARACHEL N              Ot              Z79.899          

                          OTHER LONG TERM (CURRENT) DRUG THERAPY                       

 

             2017              MALISSARACHEL N              Ot              D45              POLYCYTHEMIA

 VERA                                            

 

             2017              MALISSAABDULKADIRAN N              Ot              E11.9              

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                       

 

                2017              MALISSA BOBAN N              Ot              F17.210          

                          NICOTINE DEPENDENCE, CIGARETTES, UNCOMPL                       

 

             2017              MALISSARACHEL N              Ot              I10              ESSENTIAL

 (PRIMARY) HYPERTENSION                          

 

                2017              MALISSARACHEL N              Ot              Z79.01           

                          LONG TERM (CURRENT) USE OF ANTICOAGULANT                       

 

                2017              MALISSAABDULKADIRAN N              Ot              Z79.899          

                          OTHER LONG TERM (CURRENT) DRUG THERAPY                       

 

             2017              MALISSAABDULKADIRAN N              Ot              D45              POLYCYTHEMIA

 VERA                                            

 

             2017              MALISSA, BOBAN N              Ot              E11.9              

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                       

 

                2017              RACHEL LEONARDO              Ot              F17.210          

                          NICOTINE DEPENDENCE, CIGARETTES, UNCOMPL                       

 

             2017              RACHEL LEONARDO              Ot              I10              ESSENTIAL

 (PRIMARY) HYPERTENSION                          

 

                2017              RACHEL LEONARDO              Ot              Z79.01           

                          LONG TERM (CURRENT) USE OF ANTICOAGULANT                       

 

                2017              RACHEL LEONARDO N              Ot              Z79.899          

                          OTHER LONG TERM (CURRENT) DRUG THERAPY                       

 

             2018              RACHEL LEONARDO              Ot              D45              POLYCYTHEMIA

 VERA                                            

 

             2018              RACHEL LEONARDO              Ot              E11.9              

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          

                          NICOTINE DEPENDENCE, CIGARETTES, UNCOMPL                       

 

             2018              RACHEL LEONARDO              Ot              I10              ESSENTIAL

 (PRIMARY) HYPERTENSION                          

 

                2018              RACHEL LEONARDO              Ot              Z79.01           

                          LONG TERM (CURRENT) USE OF ANTICOAGULANT                       

 

                2018              RACHEL LEONARDO              Ot              Z79.899          

                          OTHER LONG TERM (CURRENT) DRUG THERAPY                       

 

                2018              RACHEL LEONARDO N              Ot              250.00           

                          DIAB FABRICE WO COMPL, TYPE II OR UNSPEC TY                       

 

             2018              RACHEL LEONARDO N              Ot              282.7              

HEMOGLOBINOPATHIES NEC                           

 

             2018              RACHEL LEONARDO N              Ot              305.1              

TOBACCO USE DISORDER                             

 

             2018              RACHEL LEONARDO N              Ot              401.9              

HYPERTENSION NOS                                 

 

                2018              SOTO, HILAH S ARNP              Ot              238.4      

                          POLYCYTHEMIA VERA                       

 

                2018              SOTO, HILAH S ARNP              Ot              250.00     

                          DIAB FABRICE WO COMPL, TYPE II OR UNSPEC TY                       

 

                2018              SOTO, HILAH S ARNP              Ot              282.7      

                          HEMOGLOBINOPATHIES NEC                       

 

                2018              SOTO, HILAH S ARNP              Ot              305.1      

                          TOBACCO USE DISORDER                       

 

                2018              SOTO, HILAH S ARNP              Ot              401.9      

                          HYPERTENSION NOS                       

 

                2018              SOTO, HILAH S ARNP              Ot              238.4      

                          POLYCYTHEMIA VERA                       

 

                2018              SOTO, HILAH S ARNP              Ot              250.00     

                          DIAB FABRICE WO COMPL, TYPE II OR UNSPEC TY                       

 

                2018              SOTO, HILAH S ARNP              Ot              282.7      

                          HEMOGLOBINOPATHIES NEC                       

 

                2018              SOTO, HILAH S ARNP              Ot              305.1      

                          TOBACCO USE DISORDER                       

 

                2018              RHYS SOTO ARNP              Ot              401.9      

                          HYPERTENSION NOS                       

 

                2018              RHYS SOTO S ARNP              Ot              238.4      

                          POLYCYTHEMIA VERA                       

 

                2018              RHYS SOTO S ARNP              Ot              250.00     

                          DIAB FABRICE WO COMPL, TYPE II OR UNSPEC TY                       

 

                2018              RHYS SOTO S ARNP              Ot              282.7      

                          HEMOGLOBINOPATHIES NEC                       

 

                2018              RHYS SOTO S ARNP              Ot              305.1      

                          TOBACCO USE DISORDER                       

 

                2018              RHYS SOTO S ARNP              Ot              401.9      

                          HYPERTENSION NOS                       

 

                2018              KATHY MARTIN, KANNAN STATON              Ot              250.92      

                          DIAB W UNSPEC COMPL, TYPE II OR UNSPEC T                       

 

                2018              RHYS SOTO S ARNP              Ot              238.4      

                          POLYCYTHEMIA VERA                       

 

                2018              RHYS SOTO S ARNP              Ot              282.7      

                          HEMOGLOBINOPATHIES NEC                       

 

                2018              RHYS SOTO ARNP              Ot              723.0      

                          CERVICAL SPINAL STENOSIS                       

 

                2018              RHYS SOTO ARNP              Ot              V58.69     

                          OT MED,LT,CURRENT USE                       

 

                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        

                          POLYCYTHEMIA VERA                       

 

                2018              RHYS OSTO ARNP              Ot              E11.9      

                          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                       

 

                2018              RHYS SOTO S ARNP              Ot              F17.210    

                          NICOTINE DEPENDENCE, CIGARETTES, UNCOMPL                       

 

                2018              RHYS SOTO ARNP              Ot              I10        

                          ESSENTIAL (PRIMARY) HYPERTENSION                       

 

                2018              RHYS SOTO S ARNP              Ot              Z79.899    

                          OTHER LONG TERM (CURRENT) DRUG THERAPY                       

 

                2018              SACHI PADILLA ARNP              Ot              Z51.81      

                          ENCOUNTER FOR THERAPEUTIC DRUG LEVEL MON                       

 

                2018              SACHI PADILLA ARNP              Ot              Z79.01      

                          LONG TERM (CURRENT) USE OF ANTICOAGULANT                       

 

                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        

                          POLYCYTHEMIA VERA                       

 

                2018              RHYS SOTO ARNP              Ot              E11.9      

                          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                       

 

                2018              RHYS SOTO ARNP              Ot              F17.210    

                          NICOTINE DEPENDENCE, CIGARETTES, UNCOMPL                       

 

                2018              RHYS SOTO S ARNP              Ot              I10        

                          ESSENTIAL (PRIMARY) HYPERTENSION                       

 

                2018              SOTORHYS GOMEZ ARNP              Ot              Z79.899    

                          OTHER LONG TERM (CURRENT) DRUG THERAPY                       

 

                2018              SOTO RHYS GOMEZ ARNP              Ot              D45        

                          POLYCYTHEMIA VERA                       

 

                2018              SOTO RHYS GOMEZ ARNP              Ot              E11.9      

                          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                       

 

                2018              SOTO RHYS GOMEZ ARNP              Ot              F17.210    

                          NICOTINE DEPENDENCE, CIGARETTES, UNCOMPL                       

 

                2018              SOTO RHYS GOMEZ ARNP              Ot              I10        

                          ESSENTIAL (PRIMARY) HYPERTENSION                       

 

                2018              SOTO RHYS GOMEZ ARNP              Ot              Z79.01     

                          LONG TERM (CURRENT) USE OF ANTICOAGULANT                       

 

                2018              BRITTANY RHYS GOMEZ ARNP              Ot              Z79.899    

                          OTHER LONG TERM (CURRENT) DRUG THERAPY                       

 

                2018              SOTO RHYS GOMEZ ARNP              Ot              D45        

                          POLYCYTHEMIA VERA                       

 

                2018              SOTO RHYS GOMEZ ARNP              Ot              E11.9      

                          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                       

 

                2018              SOTO RHYS GOMEZ ARNP              Ot              F17.210    

                          NICOTINE DEPENDENCE, CIGARETTES, UNCOMPL                       

 

                2018              SOTORHSY GOMEZ ARNP              Ot              I10        

                          ESSENTIAL (PRIMARY) HYPERTENSION                       

 

                2018              SOTO RHYS GOMEZ ARNP              Ot              Z79.01     

                          LONG TERM (CURRENT) USE OF ANTICOAGULANT                       

 

                2018              BRITTANY RHYS GOMEZ ARNP              Ot              Z79.899    

                          OTHER LONG TERM (CURRENT) DRUG THERAPY                       

 

             2018              RACHEL LEONARDO              Ot              D45              POLYCYTHEMIA

 VERA                                            

 

             2018              RACHEL LEONARDO              Ot              E11.9              

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          

                          NICOTINE DEPENDENCE, CIGARETTES, UNCOMPL                       

 

             2018              RACHEL LEONARDO              Ot              I10              ESSENTIAL

 (PRIMARY) HYPERTENSION                          

 

                2018              RACHEL LEONARDO              Ot              Z79.01           

                          LONG TERM (CURRENT) USE OF ANTICOAGULANT                       

 

                2018              RACHEL LEONARDO              Ot              Z79.899          

                          OTHER LONG TERM (CURRENT) DRUG THERAPY                       

 

                2018              RACHEL LEONARDO              Ot              250.00           

                          DIAB FABRIEC WO COMPL, TYPE II OR UNSPEC TY                       

 

             2018              RACHEL LEONARDO              Ot              282.7              

HEMOGLOBINOPATHIES NEC                           

 

             2018              RACHEL LEONARDO N              Ot              305.1              

TOBACCO USE DISORDER                             

 

             2018              RACHEL LEONARDO N              Ot              401.9              

HYPERTENSION NOS                                 

 

                2018              SOTO, HILAH S ARNP              Ot              238.4      

                          POLYCYTHEMIA VERA                       

 

                2018              SOTO, HILAH S ARNP              Ot              250.00     

                          DIAB FABRICE WO COMPL, TYPE II OR UNSPEC TY                       

 

                2018              SOTO HILAH S ARNP              Ot              282.7      

                          HEMOGLOBINOPATHIES NEC                       

 

                2018              SOTO, HILAH S ARNP              Ot              305.1      

                          TOBACCO USE DISORDER                       

 

                2018              SOTO HILAH S ARNP              Ot              401.9      

                          HYPERTENSION NOS                       

 

                2018              SOTO HILAH S ARNP              Ot              238.4      

                          POLYCYTHEMIA VERA                       

 

                2018              SOTO, HILAH S ARNP              Ot              250.00     

                          DIAB FABRICE WO COMPL, TYPE II OR UNSPEC TY                       

 

                2018              SOTO HILAH S ARNP              Ot              282.7      

                          HEMOGLOBINOPATHIES NEC                       

 

                2018              SOTO HILAH S ARNP              Ot              305.1      

                          TOBACCO USE DISORDER                       

 

                2018              SOTO, HILAH S ARNP              Ot              401.9      

                          HYPERTENSION NOS                       

 

                2018              SOTO, HILAH S ARNP              Ot              238.4      

                          POLYCYTHEMIA VERA                       

 

                2018              SOTO, HILAH S ARNP              Ot              250.00     

                          DIAB FABRICE WO COMPL, TYPE II OR UNSPEC TY                       

 

                2018              SOTO, HILAH S ARNP              Ot              282.7      

                          HEMOGLOBINOPATHIES NEC                       

 

                2018              SOTO, HILAH S ARNP              Ot              305.1      

                          TOBACCO USE DISORDER                       

 

                2018              SOTO, HILAH S ARNP              Ot              401.9      

                          HYPERTENSION NOS                       

 

                2018              KANNAN CHAVEZ MD              Ot              250.92      

                          DIAB W UNSPEC COMPL, TYPE II OR UNSPEC T                       

 

                2018              SOTO HILAH S ARNP              Ot              238.4      

                          POLYCYTHEMIA VERA                       

 

                2018              SOTO HILAH S ARNP              Ot              282.7      

                          HEMOGLOBINOPATHIES NEC                       

 

                2018              BRITTANY HILAH S ARNP              Ot              723.0      

                          CERVICAL SPINAL STENOSIS                       

 

                2018              BRITTANY HILAH S ARNP              Ot              V58.69     

                          OTH MED,LT,CURRENT USE                       

 

                2018              SOTO HILAH S ARNP              Ot              D45        

                          POLYCYTHEMIA VERA                       

 

                2018              RHYS SOTO ARNP              Ot              E11.9      

                          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                       

 

                2018              RHYS SOTO ARNP              Ot              F17.210    

                          NICOTINE DEPENDENCE, CIGARETTES, UNCOMPL                       

 

                2018              RHYS SOTO ARNP              Ot              I10        

                          ESSENTIAL (PRIMARY) HYPERTENSION                       

 

                2018              KAVON SOTOCHIQUIS GOMEZ ARNP              Ot              Z79.899    

                          OTHER LONG TERM (CURRENT) DRUG THERAPY                       

 

                2018              SACHI PADILLA ARNP              Ot              Z51.81      

                          ENCOUNTER FOR THERAPEUTIC DRUG LEVEL MON                       

 

                2018              SACHI PADILLA ARNP              Ot              Z79.01      

                          LONG TERM (CURRENT) USE OF ANTICOAGULANT                       

 

                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        

                          POLYCYTHEMIA VERA                       

 

                2018              RHYS SOTO ARNP              Ot              E11.9      

                          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                       

 

                2018              RHYS SOTO ARNP              Ot              F17.210    

                          NICOTINE DEPENDENCE, CIGARETTES, UNCOMPL                       

 

                2018              RHYS SOTO ARNP              Ot              I10        

                          ESSENTIAL (PRIMARY) HYPERTENSION                       

 

                2018              KAVON SOTOCHIQUIS S ARNP              Ot              Z79.899    

                          OTHER LONG TERM (CURRENT) DRUG THERAPY                       

 

                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        

                          POLYCYTHEMIA VERA                       

 

                2018              RHYS SOTO ARNP              Ot              E11.9      

                          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                       

 

                2018              RHYS SOTO ARNP              Ot              F17.210    

                          NICOTINE DEPENDENCE, CIGARETTES, UNCOMPL                       

 

                2018              RHYS SOTO ARNP              Ot              I10        

                          ESSENTIAL (PRIMARY) HYPERTENSION                       

 

                2018              RHYS SOTO ARNP              Ot              Z79.01     

                          LONG TERM (CURRENT) USE OF ANTICOAGULANT                       

 

                2018              KAVON SOTOCHIQUIS S ARNP              Ot              Z79.899    

                          OTHER LONG TERM (CURRENT) DRUG THERAPY                       

 

                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        

                          POLYCYTHEMIA VERA                       

 

                2018              RHYS SOTO ARNP              Ot              E11.9      

                          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                       

 

                2018              RHYS SOTO ARNP              Ot              F17.210    

                          NICOTINE DEPENDENCE, CIGARETTES, UNCOMPL                       

 

                2018              RHYS SOTO ARNP              Ot              I10        

                          ESSENTIAL (PRIMARY) HYPERTENSION                       

 

                2018              BRITTANY RHYS S ARNP              Ot              Z79.01     

                          LONG TERM (CURRENT) USE OF ANTICOAGULANT                       

 

                2018              BRITTANY RHYS S ARNP              Ot              Z79.899    

                          OTHER LONG TERM (CURRENT) DRUG THERAPY                       

 

             2018              MALISSA, BOBAN N              Ot              D45              POLYCYTHEMIA

 VERA                                            

 

             2018              MALISSA, BOBAN N              Ot              E11.9              

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                       

 

                2018              MALISSA, BOBAN N              Ot              F17.210          

                          NICOTINE DEPENDENCE, CIGARETTES, UNCOMPL                       

 

             2018              MALISSA, BOBAN N              Ot              I10              ESSENTIAL

 (PRIMARY) HYPERTENSION                          

 

                2018              MALISSA, BOBAN N              Ot              Z79.01           

                          LONG TERM (CURRENT) USE OF ANTICOAGULANT                       

 

                2018              MALISSA, BOBAN N              Ot              Z79.899          

                          OTHER LONG TERM (CURRENT) DRUG THERAPY                       

 

             2018              MALISSA, BOBAN N              Ot              D45              POLYCYTHEMIA

 VERA                                            

 

             2018              MALISSA, BOBAN N              Ot              E11.9              

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                       

 

                2018              MALISSA, BOBAN N              Ot              F17.210          

                          NICOTINE DEPENDENCE, CIGARETTES, UNCOMPL                       

 

             2018              MALISSA, BOBAN N              Ot              I10              ESSENTIAL

 (PRIMARY) HYPERTENSION                          

 

                2018              MALISSA, BOBAN N              Ot              Z79.01           

                          LONG TERM (CURRENT) USE OF ANTICOAGULANT                       

 

                2018              MALISSA, BOBAN N              Ot              Z79.899          

                          OTHER LONG TERM (CURRENT) DRUG THERAPY                       

 

             06/10/2018              MALISSA, BOBAN N              Ot              D45              POLYCYTHEMIA

 VERA                                            

 

             06/10/2018              MALISSA, BOBAN N              Ot              E11.9              

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                       

 

                06/10/2018              MALISSA, BOBAN N              Ot              F17.210          

                          NICOTINE DEPENDENCE, CIGARETTES, UNCOMPL                       

 

             06/10/2018              MALISSA, BOBAN N              Ot              I10              ESSENTIAL

 (PRIMARY) HYPERTENSION                          

 

                06/10/2018              MALISSA, BOBAN N              Ot              Z79.01           

                          LONG TERM (CURRENT) USE OF ANTICOAGULANT                       

 

                06/10/2018              MALISSA, BOBAN N              Ot              Z79.899          

                          OTHER LONG TERM (CURRENT) DRUG THERAPY                       

 

             2018              MALISSA, BOBAN N              Ot              D45              POLYCYTHEMIA

 VERA                                            

 

             2018              MALISSA, BOBAN N              Ot              E11.9              

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                       

 

                2018              MALISSA, BOBAN N              Ot              F17.210          

                          NICOTINE DEPENDENCE, CIGARETTES, UNCOMPL                       

 

             2018              RACHEL LEONARDO N              Ot              I10              ESSENTIAL

 (PRIMARY) HYPERTENSION                          

 

                2018              RACHEL LEONARDO N              Ot              Z79.01           

                          LONG TERM (CURRENT) USE OF ANTICOAGULANT                       

 

                2018              RACHEL LEONARDO N              Ot              Z79.899          

                          OTHER LONG TERM (CURRENT) DRUG THERAPY                       

 

             2018              RACHEL LEONARDO N              Ot              D45              POLYCYTHEMIA

 VERA                                            

 

             2018              RACHEL LEONARDO N              Ot              E11.9              

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                       

 

                2018              RACHEL LEONARDO N              Ot              F17.210          

                          NICOTINE DEPENDENCE, CIGARETTES, UNCOMPL                       

 

             2018              RACHEL LEONARDO N              Ot              I10              ESSENTIAL

 (PRIMARY) HYPERTENSION                          

 

                2018              RACHEL LEONARDO              Ot              Z79.01           

                          LONG TERM (CURRENT) USE OF ANTICOAGULANT                       

 

                2018              RACHEL LEONARDO N              Ot              Z79.899          

                          OTHER LONG TERM (CURRENT) DRUG THERAPY                       

 

                2018              RACHEL LEONARDO N              Ot              250.00           

                          DIAB FABRICE WO COMPL, TYPE II OR UNSPEC TY                       

 

             2018              RACHEL LEONARDO N              Ot              282.7              

HEMOGLOBINOPATHIES NEC                           

 

             2018              RACHEL LEONARDO N              Ot              305.1              

TOBACCO USE DISORDER                             

 

             2018              RACHEL LEONARDO N              Ot              401.9              

HYPERTENSION NOS                                 

 

                2018              SOTO, HILAH S ARNP              Ot              238.4      

                          POLYCYTHEMIA VERA                       

 

                2018              SOTO, HILAH S ARNP              Ot              250.00     

                          DIAB FABRICE WO COMPL, TYPE II OR UNSPEC TY                       

 

                2018              SOTO, HILAH S ARNP              Ot              282.7      

                          HEMOGLOBINOPATHIES NEC                       

 

                2018              SOTO, HILAH S ARNP              Ot              305.1      

                          TOBACCO USE DISORDER                       

 

                2018              SOTO, HILAH S ARNP              Ot              401.9      

                          HYPERTENSION NOS                       

 

                2018              SOTO, HILAH S ARNP              Ot              238.4      

                          POLYCYTHEMIA VERA                       

 

                2018              SOTO, HILAH S ARNP              Ot              250.00     

                          DIAB FABRICE WO COMPL, TYPE II OR UNSPEC TY                       

 

                2018              SOTO, HILAH S ARNP              Ot              282.7      

                          HEMOGLOBINOPATHIES NEC                       

 

                2018              SOTO, HILAH S ARNP              Ot              305.1      

                          TOBACCO USE DISORDER                       

 

                2018              SOTO, HILAH S ARNP              Ot              401.9      

                          HYPERTENSION NOS                       

 

                2018              BRITTANY RHYS S ARNP              Ot              238.4      

                          POLYCYTHEMIA VERA                       

 

                2018              BRITTANY RHYS S ARNP              Ot              250.00     

                          DIAB FABRICE WO COMPL, TYPE II OR UNSPEC TY                       

 

                2018              BRITTANY RHYS S ARNP              Ot              282.7      

                          HEMOGLOBINOPATHIES NEC                       

 

                2018              BRITTANY RHYS S ARNP              Ot              305.1      

                          TOBACCO USE DISORDER                       

 

                2018              BRITTANY RHYS S ARNP              Ot              401.9      

                          HYPERTENSION NOS                       

 

                2018              KANNAN CHAVEZ MD              Ot              250.92      

                          DIAB W UNSPEC COMPL, TYPE II OR UNSPEC T                       

 

                2018              BRITTANY RHYS S ARNP              Ot              238.4      

                          POLYCYTHEMIA VERA                       

 

                2018              BRITTANY RHYS S ARNP              Ot              282.7      

                          HEMOGLOBINOPATHIES NEC                       

 

                2018              BRITTANY RHYS S ARNP              Ot              723.0      

                          CERVICAL SPINAL STENOSIS                       

 

                2018              KAVON SOTOCHIQUIS S ARNP              Ot              V58.69     

                          OT MED,LT,CURRENT USE                       

 

                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        

                          POLYCYTHEMIA VERA                       

 

                2018              BRITTANY RHYS S ARNP              Ot              E11.9      

                          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                       

 

                2018              KAVON SOTOCHIQUIS S ARNP              Ot              F17.210    

                          NICOTINE DEPENDENCE, CIGARETTES, UNCOMPL                       

 

                2018              RHYS SOTO S ARNP              Ot              I10        

                          ESSENTIAL (PRIMARY) HYPERTENSION                       

 

                2018              RHYS SOTO S ARNP              Ot              Z79.899    

                          OTHER LONG TERM (CURRENT) DRUG THERAPY                       

 

                2018              SACHI PADILLA ARNP              Ot              Z51.81      

                          ENCOUNTER FOR THERAPEUTIC DRUG LEVEL MON                       

 

                2018              SACHI PADILLA ARNP              Ot              Z79.01      

                          LONG TERM (CURRENT) USE OF ANTICOAGULANT                       

 

                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        

                          POLYCYTHEMIA VERA                       

 

                2018              BRITTANY KAVONCHIQUIS S ARNP              Ot              E11.9      

                          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                       

 

                2018              RHYS SOTO S ARNP              Ot              F17.210    

                          NICOTINE DEPENDENCE, CIGARETTES, UNCOMPL                       

 

                2018              RHYS SOTO S ARNP              Ot              I10        

                          ESSENTIAL (PRIMARY) HYPERTENSION                       

 

                2018              RHYS SOTO S ARNP              Ot              Z79.899    

                          OTHER LONG TERM (CURRENT) DRUG THERAPY                       

 

                2018              SOTORHYS GOMEZ ARNP              Ot              D45        

                          POLYCYTHEMIA VERA                       

 

                2018              RHYS SOTO ARNP              Ot              E11.9      

                          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                       

 

                2018              RHYS SOTO ARNP              Ot              F17.210    

                          NICOTINE DEPENDENCE, CIGARETTES, UNCOMPL                       

 

                2018              RHYS SOTO ARNP              Ot              I10        

                          ESSENTIAL (PRIMARY) HYPERTENSION                       

 

                2018              RHYS SOTO ARNP              Ot              Z79.01     

                          LONG TERM (CURRENT) USE OF ANTICOAGULANT                       

 

                2018              RHYS SOTO ARNP              Ot              Z79.899    

                          OTHER LONG TERM (CURRENT) DRUG THERAPY                       

 

                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        

                          POLYCYTHEMIA VERA                       

 

                2018              RHYS SOTO ARNP              Ot              E11.9      

                          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                       

 

                2018              SOTO RHYS GOMEZ ARNP              Ot              F17.210    

                          NICOTINE DEPENDENCE, CIGARETTES, UNCOMPL                       

 

                2018              RHYS SOTO ARNP              Ot              I10        

                          ESSENTIAL (PRIMARY) HYPERTENSION                       

 

                2018              RHYS SOTO ARNP              Ot              Z79.01     

                          LONG TERM (CURRENT) USE OF ANTICOAGULANT                       

 

                2018              SOTORHYS GOMEZ ARNP              Ot              Z79.899    

                          OTHER LONG TERM (CURRENT) DRUG THERAPY                       

 

             2018              MALISSARACHEL N              Ot              D45              POLYCYTHEMIA

 VERA                                            

 

             2018              MALISSARACHEL N              Ot              E11.9              

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                       

 

                2018              MALISSARACHEL LOYA N              Ot              F17.210          

                          NICOTINE DEPENDENCE, CIGARETTES, UNCOMPL                       

 

             2018              MALISSAABDULKADIR LOYAAN N              Ot              I10              ESSENTIAL

 (PRIMARY) HYPERTENSION                          

 

                2018              RACHEL LEONARDO N              Ot              Z79.01           

                          LONG TERM (CURRENT) USE OF ANTICOAGULANT                       

 

                2018              ABDULKADIR LEONARDOAN N              Ot              Z79.899          

                          OTHER LONG TERM (CURRENT) DRUG THERAPY                       

 

             2018              ABDULKADIR LEONARDOAN N              Ot              D45              POLYCYTHEMIA

 VERA                                            

 

             2018              MALISSAABDULKADIR LOYAAN N              Ot              E11.9              

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                       

 

                2018              MALISSARACHEL LOYA N              Ot              F17.210          

                          NICOTINE DEPENDENCE, CIGARETTES, UNCOMPL                       

 

             2018              RACHEL LEONARDO              Ot              I10              ESSENTIAL

 (PRIMARY) HYPERTENSION                          

 

                2018              RACHEL LEONARDO              Ot              Z79.01           

                          LONG TERM (CURRENT) USE OF ANTICOAGULANT                       

 

                2018              RACHEL LEONARDO N              Ot              Z79.899          

                          OTHER LONG TERM (CURRENT) DRUG THERAPY                       

 

                10/31/2018              RACHEL LEONARDO N              Ot              250.00           

                          DIAB FABRICE WO COMPL, TYPE II OR UNSPEC TY                       

 

             10/31/2018              RACHEL LEONARDO N              Ot              282.7              

HEMOGLOBINOPATHIES NEC                           

 

             10/31/2018              RACHEL LEONARDO N              Ot              305.1              

TOBACCO USE DISORDER                             

 

             10/31/2018              RACHEL LEONARDO N              Ot              401.9              

HYPERTENSION NOS                                 

 

                10/31/2018              SOTO, HILAH S ARNP              Ot              238.4      

                          POLYCYTHEMIA VERA                       

 

                10/31/2018              SOTO, HILAH S ARNP              Ot              250.00     

                          DIAB FABRICE WO COMPL, TYPE II OR UNSPEC TY                       

 

                10/31/2018              SOTO, HILAH S ARNP              Ot              282.7      

                          HEMOGLOBINOPATHIES NEC                       

 

                10/31/2018              SOTO, HILAH S ARNP              Ot              305.1      

                          TOBACCO USE DISORDER                       

 

                10/31/2018              SOTO, HILAH S ARNP              Ot              401.9      

                          HYPERTENSION NOS                       

 

                10/31/2018              SOTO, HILAH S ARNP              Ot              238.4      

                          POLYCYTHEMIA VERA                       

 

                10/31/2018              SOTO, HILAH S ARNP              Ot              250.00     

                          DIAB FABRICE WO COMPL, TYPE II OR UNSPEC TY                       

 

                10/31/2018              SOTO, HILAH S ARNP              Ot              282.7      

                          HEMOGLOBINOPATHIES NEC                       

 

                10/31/2018              SOTO, HILAH S ARNP              Ot              305.1      

                          TOBACCO USE DISORDER                       

 

                10/31/2018              SOTO, HILAH S ARNP              Ot              401.9      

                          HYPERTENSION NOS                       

 

                10/31/2018              SOTO, HILAH S ARNP              Ot              238.4      

                          POLYCYTHEMIA VERA                       

 

                10/31/2018              SOTO, HILAH S ARNP              Ot              250.00     

                          DIAB FABRICE WO COMPL, TYPE II OR UNSPEC TY                       

 

                10/31/2018              SOTO, HILAH S ARNP              Ot              282.7      

                          HEMOGLOBINOPATHIES NEC                       

 

                10/31/2018              SOTO, HILAH S ARNP              Ot              305.1      

                          TOBACCO USE DISORDER                       

 

                10/31/2018              SOTO, HILAH S ARNP              Ot              401.9      

                          HYPERTENSION NOS                       

 

                10/31/2018              KATHY MARTIN, KANNAN STATON              Ot              250.92      

                          DIAB W UNSPEC COMPL, TYPE II OR UNSPEC T                       

 

                10/31/2018              RHYS SOTO ARNP              Ot              238.4      

                          POLYCYTHEMIA VERA                       

 

                10/31/2018              RHYS SOTO ARNP              Ot              282.7      

                          HEMOGLOBINOPATHIES NEC                       

 

                10/31/2018              RHYS SOTO ARNP              Ot              723.0      

                          CERVICAL SPINAL STENOSIS                       

 

                10/31/2018              KAVON SOTOCHIQUIS S ARNP              Ot              V58.69     

                          OTH MED,LT,CURRENT USE                       

 

                10/31/2018              RHYS SOTO ARNP              Ot              D45        

                          POLYCYTHEMIA VERA                       

 

                10/31/2018              RHYS SOTO ARNP              Ot              E11.9      

                          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                       

 

                10/31/2018              RHYS SOTO ARNP              Ot              F17.210    

                          NICOTINE DEPENDENCE, CIGARETTES, UNCOMPL                       

 

                10/31/2018              RHYS SOTO ARNP              Ot              I10        

                          ESSENTIAL (PRIMARY) HYPERTENSION                       

 

                10/31/2018              RHYS SOTO ARNP              Ot              Z79.899    

                          OTHER LONG TERM (CURRENT) DRUG THERAPY                       

 

                10/31/2018              SACHI PADILLA ARNP              Ot              Z51.81      

                          ENCOUNTER FOR THERAPEUTIC DRUG LEVEL MON                       

 

                10/31/2018              SACHI PADILLA ARNP              Ot              Z79.01      

                          LONG TERM (CURRENT) USE OF ANTICOAGULANT                       

 

                10/31/2018              RHYS SOTO ARNP              Ot              D45        

                          POLYCYTHEMIA VERA                       

 

                10/31/2018              RHYS SOTO ARNP              Ot              E11.9      

                          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                       

 

                10/31/2018              RHYS SOTO ARNP              Ot              F17.210    

                          NICOTINE DEPENDENCE, CIGARETTES, UNCOMPL                       

 

                10/31/2018              RHYS SOTO ARNP              Ot              I10        

                          ESSENTIAL (PRIMARY) HYPERTENSION                       

 

                10/31/2018              RHYS SOTO ARNP              Ot              Z79.899    

                          OTHER LONG TERM (CURRENT) DRUG THERAPY                       

 

                10/31/2018              RHYS SOTO ARNP              Ot              D45        

                          POLYCYTHEMIA VERA                       

 

                10/31/2018              RHYS SOTO ARNP              Ot              E11.9      

                          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                       

 

                10/31/2018              RHYS SOTO ARNP              Ot              F17.210    

                          NICOTINE DEPENDENCE, CIGARETTES, UNCOMPL                       

 

                10/31/2018              RHYS SOTO ARNP              Ot              I10        

                          ESSENTIAL (PRIMARY) HYPERTENSION                       

 

                10/31/2018              RHYS SOTO ARNP              Ot              Z79.01     

                          LONG TERM (CURRENT) USE OF ANTICOAGULANT                       

 

                10/31/2018              RHYS SOTO ARNP              Ot              Z79.899    

                          OTHER LONG TERM (CURRENT) DRUG THERAPY                       

 

                10/31/2018              RHYS SOTO ARNP              Ot              D45        

                          POLYCYTHEMIA VERA                       

 

                10/31/2018              RHYS SOTO ARNP              Ot              E11.9      

                          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                       

 

                10/31/2018              RHYS SOTO ARNP              Ot              F17.210    

                          NICOTINE DEPENDENCE, CIGARETTES, UNCOMPL                       

 

                10/31/2018              RHYS SOTO ARNP              Ot              I10        

                          ESSENTIAL (PRIMARY) HYPERTENSION                       

 

                10/31/2018              RHYS SOTO ARNP              Ot              Z79.01     

                          LONG TERM (CURRENT) USE OF ANTICOAGULANT                       

 

                10/31/2018              RHYS SOTO ARNP              Ot              Z79.899    

                          OTHER LONG TERM (CURRENT) DRUG THERAPY                       

 

                2018              RACHEL LEONARDO              Ot              250.00           

                          DIAB FABRICE WO COMPL, TYPE II OR UNSPEC TY                       

 

             2018              RACHEL LEONARDO N              Ot              282.7              

HEMOGLOBINOPATHIES NEC                           

 

             2018              RACHEL LEONARDO              Ot              305.1              

TOBACCO USE DISORDER                             

 

             2018              MALISSARACHEL LOYA N              Ot              401.9              

HYPERTENSION NOS                                 

 

                2018              RHYS SOTO S ARNP              Ot              238.4      

                          POLYCYTHEMIA VERA                       

 

                2018              RHYS SOTO S ARNP              Ot              250.00     

                          DIAB FABRICE WO COMPL, TYPE II OR UNSPEC TY                       

 

                2018              RHYS SOTO S ARNP              Ot              282.7      

                          HEMOGLOBINOPATHIES NEC                       

 

                2018              RHYS SOTO S ARNP              Ot              305.1      

                          TOBACCO USE DISORDER                       

 

                2018              RHYS SOTO S ARNP              Ot              401.9      

                          HYPERTENSION NOS                       

 

                2018              RHYS SOTO S ARNP              Ot              238.4      

                          POLYCYTHEMIA VERA                       

 

                2018              RHYS SOTO S ARNP              Ot              250.00     

                          DIAB FABRICE WO COMPL, TYPE II OR UNSPEC TY                       

 

                2018              SOTORHYS S ARNP              Ot              282.7      

                          HEMOGLOBINOPATHIES NEC                       

 

                2018              KAVON SOTOAH S ARNP              Ot              305.1      

                          TOBACCO USE DISORDER                       

 

                2018              KAVON SOTOAH S ARNP              Ot              401.9      

                          HYPERTENSION NOS                       

 

                2018              KAVON SOTOAH S ARNP              Ot              238.4      

                          POLYCYTHEMIA VERA                       

 

                2018              RHYS SOTO S ARNP              Ot              250.00     

                          DIAB FABRICE WO COMPL, TYPE II OR UNSPEC TY                       

 

                2018              RHYS SOTO ARNP              Ot              282.7      

                          HEMOGLOBINOPATHIES NEC                       

 

                2018              RHYS SOTO ARNP              Ot              305.1      

                          TOBACCO USE DISORDER                       

 

                2018              RHYS SOTO ARNP              Ot              401.9      

                          HYPERTENSION NOS                       

 

                2018              KATHY MARTIN, KANNAN STATON              Ot              250.92      

                          DIAB W UNSPEC COMPL, TYPE II OR UNSPEC T                       

 

                2018              RHYS SOTO ARNP              Ot              238.4      

                          POLYCYTHEMIA VERA                       

 

                2018              RHYS SOTO ARNP              Ot              282.7      

                          HEMOGLOBINOPATHIES NEC                       

 

                2018              RHYS SOTO ARNP              Ot              723.0      

                          CERVICAL SPINAL STENOSIS                       

 

                2018              RHYS SOTO ARNP              Ot              V58.69     

                          OTH MED,LT,CURRENT USE                       

 

                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        

                          POLYCYTHEMIA VERA                       

 

                2018              RHYS SOTO ARNP              Ot              E11.9      

                          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                       

 

                2018              RHYS SOTO ARNP              Ot              F17.210    

                          NICOTINE DEPENDENCE, CIGARETTES, UNCOMPL                       

 

                2018              RHYS SOTO ARNP              Ot              I10        

                          ESSENTIAL (PRIMARY) HYPERTENSION                       

 

                2018              RHYS SOTO ARNP              Ot              Z79.899    

                          OTHER LONG TERM (CURRENT) DRUG THERAPY                       

 

                2018              SACHI PADILLA ARNP              Ot              Z51.81      

                          ENCOUNTER FOR THERAPEUTIC DRUG LEVEL MON                       

 

                2018              SACHI PADILLA ARNP              Ot              Z79.01      

                          LONG TERM (CURRENT) USE OF ANTICOAGULANT                       

 

                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        

                          POLYCYTHEMIA VERA                       

 

                2018              RHYS SOTO ARNP              Ot              E11.9      

                          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                       

 

                2018              RHYS SOTO ARNP              Ot              F17.210    

                          NICOTINE DEPENDENCE, CIGARETTES, UNCOMPL                       

 

                2018              RHYS SOTO ARNP              Ot              I10        

                          ESSENTIAL (PRIMARY) HYPERTENSION                       

 

                2018              RHYS SOTO ARNP              Ot              Z79.899    

                          OTHER LONG TERM (CURRENT) DRUG THERAPY                       

 

                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        

                          POLYCYTHEMIA VERA                       

 

                2018              RHYS SOTO ARNP              Ot              E11.9      

                          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                       

 

                2018              RHYS SOTO ARNP              Ot              F17.210    

                          NICOTINE DEPENDENCE, CIGARETTES, UNCOMPL                       

 

                2018              SOTORHYS GOMEZ ARNP              Ot              I10        

                          ESSENTIAL (PRIMARY) HYPERTENSION                       

 

                2018              SOTO RHYS GOMEZ ARNP              Ot              Z79.01     

                          LONG TERM (CURRENT) USE OF ANTICOAGULANT                       

 

                2018              BRITTANY RHYS S ARNP              Ot              Z79.899    

                          OTHER LONG TERM (CURRENT) DRUG THERAPY                       

 

                2018              BRITTANY RHYS S ARNP              Ot              D45        

                          POLYCYTHEMIA VERA                       

 

                2018              SOTORHYS GOMEZ S ARNP              Ot              E11.9      

                          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                       

 

                2018              SOTORHYS GOMEZ ARNP              Ot              F17.210    

                          NICOTINE DEPENDENCE, CIGARETTES, UNCOMPL                       

 

                2018              SOTORHYS GOMEZ S ARNP              Ot              I10        

                          ESSENTIAL (PRIMARY) HYPERTENSION                       

 

                2018              KAVON SOTOCHIQUIS S ARNP              Ot              Z79.01     

                          LONG TERM (CURRENT) USE OF ANTICOAGULANT                       

 

                2018              SOTORHYS GOMEZ S ARNP              Ot              Z79.899    

                          OTHER LONG TERM (CURRENT) DRUG THERAPY                       

 

                2018              RACHEL LEONARDO N              Ot              250.00           

                          DIAB FABRICE WO COMPL, TYPE II OR UNSPEC TY                       

 

             2018              RACHEL LEONARDO N              Ot              282.7              

HEMOGLOBINOPATHIES NEC                           

 

             2018              RACHEL LEONARDO N              Ot              305.1              

TOBACCO USE DISORDER                             

 

             2018              RACHEL LEONARDO N              Ot              401.9              

HYPERTENSION NOS                                 

 

                2018              RHYS SOTO S ARNP              Ot              238.4      

                          POLYCYTHEMIA VERA                       

 

                2018              RHYS SOTO S ARNP              Ot              250.00     

                          DIAB FABRICE WO COMPL, TYPE II OR UNSPEC TY                       

 

                2018              BRITTANY KAVONAH S ARNP              Ot              282.7      

                          HEMOGLOBINOPATHIES NEC                       

 

                2018              KAVON SOTOAH S ARNP              Ot              305.1      

                          TOBACCO USE DISORDER                       

 

                2018              KAVON SOTOAH S ARNP              Ot              401.9      

                          HYPERTENSION NOS                       

 

                2018              RHYS SOTO S ARNP              Ot              238.4      

                          POLYCYTHEMIA VERA                       

 

                2018              RHYS SOTO S ARNP              Ot              250.00     

                          DIAB FABRICE WO COMPL, TYPE II OR UNSPEC TY                       

 

                2018              RHYS SOTO S ARNP              Ot              282.7      

                          HEMOGLOBINOPATHIES NEC                       

 

                2018              BRITTANY RHYS S ARNP              Ot              305.1      

                          TOBACCO USE DISORDER                       

 

                2018              BRITTANY RHYS S ARNP              Ot              401.9      

                          HYPERTENSION NOS                       

 

                2018              SOTORHYS S ARNP              Ot              238.4      

                          POLYCYTHEMIA VERA                       

 

                2018              BRITTANY RHYS S ARNP              Ot              250.00     

                          DIAB FABRICE WO COMPL, TYPE II OR UNSPEC TY                       

 

                2018              BRITTANY RHYS S ARNP              Ot              282.7      

                          HEMOGLOBINOPATHIES NEC                       

 

                2018              BRITTANY RHYS S ARNP              Ot              305.1      

                          TOBACCO USE DISORDER                       

 

                2018              BRITTANY RHYS S ARNP              Ot              401.9      

                          HYPERTENSION NOS                       

 

                2018              KATYH MARTIN, KANNAN STATON              Ot              250.92      

                          DIAB W UNSPEC COMPL, TYPE II OR UNSPEC T                       

 

                2018              BRITTANY RHYS S ARNP              Ot              238.4      

                          POLYCYTHEMIA VERA                       

 

                2018              BRITTANY RHYS S ARNP              Ot              282.7      

                          HEMOGLOBINOPATHIES NEC                       

 

                2018              BRITTANY RHYS S ARNP              Ot              723.0      

                          CERVICAL SPINAL STENOSIS                       

 

                2018              BRITTANY RHYS S ARNP              Ot              V58.69     

                          OT MED,LT,CURRENT USE                       

 

                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        

                          POLYCYTHEMIA VERA                       

 

                2018              RHYS SOTO S ARNP              Ot              E11.9      

                          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                       

 

                2018              KAVON SOTOCHIQUIS S ARNP              Ot              F17.210    

                          NICOTINE DEPENDENCE, CIGARETTES, UNCOMPL                       

 

                2018              RHYS OSTO S ARNP              Ot              I10        

                          ESSENTIAL (PRIMARY) HYPERTENSION                       

 

                2018              KAVON SOTOCHIQUIS S ARNP              Ot              Z79.899    

                          OTHER LONG TERM (CURRENT) DRUG THERAPY                       

 

                2018              SACHI PADILLA ARNP              Ot              Z51.81      

                          ENCOUNTER FOR THERAPEUTIC DRUG LEVEL MON                       

 

                2018              SACHI PADILLA ARNP              Ot              Z79.01      

                          LONG TERM (CURRENT) USE OF ANTICOAGULANT                       

 

                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        

                          POLYCYTHEMIA VERA                       

 

                2018              RHYS SOTO S ARNP              Ot              E11.9      

                          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                       

 

                2018              RHYS SOTO ARNP              Ot              F17.210    

                          NICOTINE DEPENDENCE, CIGARETTES, UNCOMPL                       

 

                2018              RHYS SOTO ARNP              Ot              I10        

                          ESSENTIAL (PRIMARY) HYPERTENSION                       

 

                2018              RHYS SOTO ARNP              Ot              Z79.899    

                          OTHER LONG TERM (CURRENT) DRUG THERAPY                       

 

                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        

                          POLYCYTHEMIA VERA                       

 

                2018              RHYS SOTO ARNP              Ot              E11.9      

                          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                       

 

                2018              RHYS SOTO ARNP              Ot              F17.210    

                          NICOTINE DEPENDENCE, CIGARETTES, UNCOMPL                       

 

                2018              RHYS SOTO ARNP              Ot              I10        

                          ESSENTIAL (PRIMARY) HYPERTENSION                       

 

                2018              RHYS SOTO ARNP              Ot              Z79.01     

                          LONG TERM (CURRENT) USE OF ANTICOAGULANT                       

 

                2018              RHYS SOTO ARNP              Ot              Z79.899    

                          OTHER LONG TERM (CURRENT) DRUG THERAPY                       

 

                2018              SOTORHYS GOMEZ ARNP              Ot              D45        

                          POLYCYTHEMIA VERA                       

 

                2018              RHYS SOTO ARNP              Ot              E11.9      

                          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                       

 

                2018              RHYS SOTO ARNP              Ot              F17.210    

                          NICOTINE DEPENDENCE, CIGARETTES, UNCOMPL                       

 

                2018              RHYS SOTO ARNP              Ot              I10        

                          ESSENTIAL (PRIMARY) HYPERTENSION                       

 

                2018              RHYS SOTO ARNP              Ot              Z79.01     

                          LONG TERM (CURRENT) USE OF ANTICOAGULANT                       

 

                2018              SOTORHYS GOMEZ ARNP              Ot              Z79.899    

                          OTHER LONG TERM (CURRENT) DRUG THERAPY                       

 

                2018              JEAN CARLOS WILLETT              Ot              E11.621    

                          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                       

 

                2018              JEAN CARLOS WILLETT              Ot              I70.202    

                          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                       

 

                2018              JEAN CARLOS WILLETT APRN              Ot              I87.2      

                          VENOUS INSUFFICIENCY (CHRONIC) (PERIPHER                       

 

                2018              JEAN CARLOS WILLETT APRN              Ot              L97.512    

                          NON-PRS CHRONIC ULCER OTH PRT RIGHT FOOT                       

 

                2018              JEAN CARLOS WILLETT APREVERARDO              Ot              L97.522    

                          NON-PRS CHRONIC ULCER OTH PRT LEFT FOOT                        

 

                2018              JEAN CARLOS WILLETT APRN              Ot              E11.621    

                          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                       

 

                2018              JEAN CARLOS WILLETT R APRN              Ot              I70.203    

                          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                       

 

                2018              JEAN CARLOS WILLETT R APRN              Ot              I87.2      

                          VENOUS INSUFFICIENCY (CHRONIC) (PERIPHER                       

 

                2018              JEAN CARLOS WILLETT APRN              Ot              L97.512    

                          NON-PRS CHRONIC ULCER OTH PRT RIGHT FOOT                       

 

                2018              JEAN CARLOS WILLETT R APRN              Ot              L97.522    

                          NON-PRS CHRONIC ULCER OTH PRT LEFT FOOT                        

 

                2018              JEAN CARLOS WILLETT APRN              Ot              Z98.890    

                          OTHER SPECIFIED POSTPROCEDURAL STATES                       

 

                2018              JEAN CARLOS WILLETT APRN              Ot              E11.621    

                          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                       

 

                2018              JEAN CARLOS WILLETT R APRN              Ot              I70.202    

                          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                       

 

                2018              JEAN CARLOS WILLETT R APRN              Ot              I87.2      

                          VENOUS INSUFFICIENCY (CHRONIC) (PERIPHER                       

 

                2018              JEAN CARLOS WILLETT R APRN              Ot              L97.512    

                          NON-PRS CHRONIC ULCER OTH PRT RIGHT FOOT                       

 

                2018              JEAN CARLOS WILLETT R APRN              Ot              L97.522    

                          NON-PRS CHRONIC ULCER OTH PRT LEFT FOOT                        

 

                2018              RACHEL LEONARDO              Ot              250.00           

                          DIAB FABRICE WO COMPL, TYPE II OR UNSPEC TY                       

 

             2018              RACHEL LEONARDO N              Ot              282.7              

HEMOGLOBINOPATHIES NEC                           

 

             2018              RACHEL LEONARDO N              Ot              305.1              

TOBACCO USE DISORDER                             

 

             2018              RACHEL LEONARDO N              Ot              401.9              

HYPERTENSION NOS                                 

 

                2018              RHYS SOTO S ARNP              Ot              238.4      

                          POLYCYTHEMIA VERA                       

 

                2018              RHYS SOTO ARNP              Ot              250.00     

                          DIAB FABRICE WO COMPL, TYPE II OR UNSPEC TY                       

 

                2018              RHYS SOTO ARNP              Ot              282.7      

                          HEMOGLOBINOPATHIES NEC                       

 

                2018              RHYS SOTO S ARNP              Ot              305.1      

                          TOBACCO USE DISORDER                       

 

                2018              RHYS SOTO S ARNP              Ot              401.9      

                          HYPERTENSION NOS                       

 

                2018              SOTO, HILAH S ARNP              Ot              238.4      

                          POLYCYTHEMIA VERA                       

 

                2018              SOTO, HILAH S ARNP              Ot              250.00     

                          DIAB FABRICE WO COMPL, TYPE II OR UNSPEC TY                       

 

                2018              SOTO, HILCHIQUIS S ARNP              Ot              282.7      

                          HEMOGLOBINOPATHIES NEC                       

 

                2018              SOTO, HILAH S ARNP              Ot              305.1      

                          TOBACCO USE DISORDER                       

 

                2018              SOTO, HILAH S ARNP              Ot              401.9      

                          HYPERTENSION NOS                       

 

                2018              SOTO, HILAH S ARNP              Ot              238.4      

                          POLYCYTHEMIA VERA                       

 

                2018              SOTO HILAH S ARNP              Ot              250.00     

                          DIAB FABRICE WO COMPL, TYPE II OR UNSPEC TY                       

 

                2018              SOTO, HILAH S ARNP              Ot              282.7      

                          HEMOGLOBINOPATHIES NEC                       

 

                2018              SOTO, HILAH S ARNP              Ot              305.1      

                          TOBACCO USE DISORDER                       

 

                2018              SOTO, HILAH S ARNP              Ot              401.9      

                          HYPERTENSION NOS                       

 

                2018              KATHY MARTIN, KANNAN STATON              Ot              250.92      

                          DIAB W UNSPEC COMPL, TYPE II OR UNSPEC T                       

 

                2018              SOTO, HILAH S ARNP              Ot              238.4      

                          POLYCYTHEMIA VERA                       

 

                2018              SOTO, HILAH S ARNP              Ot              282.7      

                          HEMOGLOBINOPATHIES NEC                       

 

                2018              BRITTANY KAVONAH S ARNP              Ot              723.0      

                          CERVICAL SPINAL STENOSIS                       

 

                2018              RHYS SOTO S ARNP              Ot              V58.69     

                          OTH MED,LT,CURRENT USE                       

 

                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        

                          POLYCYTHEMIA VERA                       

 

                2018              BRITTANY HILAH S ARNP              Ot              E11.9      

                          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                       

 

                2018              BRITTANY HILAH S ARNP              Ot              F17.210    

                          NICOTINE DEPENDENCE, CIGARETTES, UNCOMPL                       

 

                2018              KAVON SOTOAH S ARNP              Ot              I10        

                          ESSENTIAL (PRIMARY) HYPERTENSION                       

 

                2018              BRITTANY HILAH S ARNP              Ot              Z79.899    

                          OTHER LONG TERM (CURRENT) DRUG THERAPY                       

 

                2018              SACHI PADILLA ARNP              Ot              Z51.81      

                          ENCOUNTER FOR THERAPEUTIC DRUG LEVEL MON                       

 

                2018              SACHI PADILLA ARNP              Ot              Z79.01      

                          LONG TERM (CURRENT) USE OF ANTICOAGULANT                       

 

                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        

                          POLYCYTHEMIA VERA                       

 

                2018              RHYS SOTO ARNP              Ot              E11.9      

                          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                       

 

                2018              RHYS SOTO ARNP              Ot              F17.210    

                          NICOTINE DEPENDENCE, CIGARETTES, UNCOMPL                       

 

                2018              SOTORHYS GOMEZ ARNP              Ot              I10        

                          ESSENTIAL (PRIMARY) HYPERTENSION                       

 

                2018              SOTORHYS GOMEZ ARNP              Ot              Z79.899    

                          OTHER LONG TERM (CURRENT) DRUG THERAPY                       

 

                2018              SOTORHYS GOMEZ ARNP              Ot              D45        

                          POLYCYTHEMIA VERA                       

 

                2018              RHYS SOTO ARNP              Ot              E11.9      

                          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                       

 

                2018              RHYS SOTO ARNP              Ot              F17.210    

                          NICOTINE DEPENDENCE, CIGARETTES, UNCOMPL                       

 

                2018              SOTORHYS GOMEZ ARNP              Ot              I10        

                          ESSENTIAL (PRIMARY) HYPERTENSION                       

 

                2018              SOTORHYS GOMEZ ARNP              Ot              Z79.01     

                          LONG TERM (CURRENT) USE OF ANTICOAGULANT                       

 

                2018              RHYS SOTO ARNP              Ot              Z79.899    

                          OTHER LONG TERM (CURRENT) DRUG THERAPY                       

 

                2018              SOTORHYS GOMEZ ARNP              Ot              D45        

                          POLYCYTHEMIA VERA                       

 

                2018              RHYS SOTO ARNP              Ot              E11.9      

                          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                       

 

                2018              RHYS SOTO ARNP              Ot              F17.210    

                          NICOTINE DEPENDENCE, CIGARETTES, UNCOMPL                       

 

                2018              SOTORHYS GOMEZ ARNP              Ot              I10        

                          ESSENTIAL (PRIMARY) HYPERTENSION                       

 

                2018              RHYS SOTO ARNP              Ot              Z79.01     

                          LONG TERM (CURRENT) USE OF ANTICOAGULANT                       

 

                2018              SOTORHYS GOMEZ ARNP              Ot              Z79.899    

                          OTHER LONG TERM (CURRENT) DRUG THERAPY                       

 

             2018              RACHEL LEONARDO              Ot              D45              POLYCYTHEMIA

 VERA                                            

 

             2018              RACHEL LEONARDO              Ot              E11.9              

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          

                          NICOTINE DEPENDENCE, CIGARETTES, UNCOMPL                       

 

             2018              MALISSA, BOBAN N              Ot              I10              ESSENTIAL

 (PRIMARY) HYPERTENSION                          

 

                2018              RACHEL LEONARDO              Ot              Z79.01           

                          LONG TERM (CURRENT) USE OF ANTICOAGULANT                       

 

                2018              RACHEL LEONARDO              Ot              Z79.899          

                          OTHER LONG TERM (CURRENT) DRUG THERAPY                       

 

                2018              JEAN CARLOS WILLETT APRN              Ot              E11.621    

                          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                       

 

                2018              JEAN CARLOS WILLETT R APRN              Ot              I70.201    

                          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                       

 

                2018              TAMIEJEAN CARLOS R APRN              Ot              I70.202    

                          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                       

 

                2018              TAMIE JEAN CARLOS R APRN              Ot              I87.2      

                          VENOUS INSUFFICIENCY (CHRONIC) (PERIPHER                       

 

                2018              TAMIE JEAN CARLOS R APRN              Ot              L97.512    

                          NON-PRS CHRONIC ULCER OTH PRT RIGHT FOOT                       

 

                2018              JEAN CARLOS WILLETT R APRN              Ot              L97.522    

                          NON-PRS CHRONIC ULCER OTH PRT LEFT FOOT                        

 

                2018              JEAN CARLOS WILLETT R APRN              Ot              E11.621    

                          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                       

 

                2018              TAMIE JEAN CARLOS R APRN              Ot              I70.203    

                          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                       

 

                2018              TAMIEJEAN CARLOS R APRN              Ot              I87.2      

                          VENOUS INSUFFICIENCY (CHRONIC) (PERIPHER                       

 

                2018              JEAN CARLOS WILLETT R APRN              Ot              L97.512    

                          NON-PRS CHRONIC ULCER OTH PRT RIGHT FOOT                       

 

                2018              JEAN CARLOS WILLETT R APRN              Ot              L97.522    

                          NON-PRS CHRONIC ULCER OTH PRT LEFT FOOT                        

 

                2018              JEAN CARLOS WILLETT APRN              Ot              Z98.890    

                          OTHER SPECIFIED POSTPROCEDURAL STATES                       

 

                11/15/2018              TAMIE JEAN CARLOS R APRN              Ot              E11.621    

                          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                       

 

                11/15/2018              JEAN CARLOS WILLETT R APRN              Ot              I70.201    

                          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                       

 

                11/15/2018              JEAN CARLOS WILLETT R APRN              Ot              I70.202    

                          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                       

 

                11/15/2018              TAMIE JEAN CARLOS R APRN              Ot              I87.2      

                          VENOUS INSUFFICIENCY (CHRONIC) (PERIPHER                       

 

                11/15/2018              JEAN CARLOS WILLETT R APRN              Ot              L97.512    

                          NON-PRS CHRONIC ULCER OTH PRT RIGHT FOOT                       

 

                11/15/2018              JEAN CARLOS WILLETT R APRN              Ot              L97.522    

                          NON-PRS CHRONIC ULCER OTH PRT LEFT FOOT                        

 

                11/15/2018              JEAN CARLOS WILLETT R APRN              Ot              E11.621    

                          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                       

 

                11/15/2018              JEAN CARLOS WILLETT R APRN              Ot              I70.201    

                          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                       

 

                11/15/2018              TAMIE JEAN CARLOS R APRN              Ot              I70.202    

                          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                       

 

                11/15/2018              JEAN CARLOS WILLETT R APRN              Ot              I87.2      

                          VENOUS INSUFFICIENCY (CHRONIC) (PERIPHER                       

 

                11/15/2018              JEAN CARLOS WILLETT R APRN              Ot              L97.512    

                          NON-PRS CHRONIC ULCER OTH PRT RIGHT FOOT                       

 

                11/15/2018              TAMIEJEAN CARLOS R APRN              Ot              L97.522    

                          NON-PRS CHRONIC ULCER OTH PRT LEFT FOOT                        

 

                2018              JEAN CARLOS WILLETT R APRN              Ot              E11.621    

                          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                       

 

                2018              JEAN CARLOS WILLETT APRN              Ot              I70.203    

                          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                       

 

                2018              JEAN CARLOS WILLETT R APRN              Ot              I87.2      

                          VENOUS INSUFFICIENCY (CHRONIC) (PERIPHER                       

 

                2018              JEAN CARLOS WILLETT R APRN              Ot              L03.032    

                          CELLULITIS OF LEFT TOE                       

 

                2018              JEAN CARLOS WILLETT R APRN              Ot              L97.512    

                          NON-PRS CHRONIC ULCER OTH PRT RIGHT FOOT                       

 

                2018              JEAN CARLOS WILLETT R APRN              Ot              L97.522    

                          NON-PRS CHRONIC ULCER OTH PRT LEFT FOOT                        

 

                2018              JEAN CARLOS WILLETT R APRN              Ot              M86.8X8    

                          OTHER OSTEOMYELITIS, OTHER SITE                       

 

                2018              RACHEL LEONARDO              Ot              250.00           

                          DIAB FABRICE WO COMPL, TYPE II OR UNSPEC TY                       

 

             2018              RACHEL LEONARDO N              Ot              282.7              

HEMOGLOBINOPATHIES NEC                           

 

             2018              RACHEL LEONARDO              Ot              305.1              

TOBACCO USE DISORDER                             

 

             2018              RACHEL LEONARDO N              Ot              401.9              

HYPERTENSION NOS                                 

 

                2018              RHYS SOTO ARNP              Ot              238.4      

                          POLYCYTHEMIA VERA                       

 

                2018              RHYS SOTO ARNP              Ot              250.00     

                          DIAB FABRICE WO COMPL, TYPE II OR UNSPEC TY                       

 

                2018              SOTO, HILAH S ARNP              Ot              282.7      

                          HEMOGLOBINOPATHIES NEC                       

 

                2018              KAVON SOTOAH S ARNP              Ot              305.1      

                          TOBACCO USE DISORDER                       

 

                2018              KAVON SOTOAH S ARNP              Ot              401.9      

                          HYPERTENSION NOS                       

 

                2018              BRITTANY HILAH S ARNP              Ot              238.4      

                          POLYCYTHEMIA VERA                       

 

                2018              KAVON SOTOAH S ARNP              Ot              250.00     

                          DIAB FABRICE WO COMPL, TYPE II OR UNSPEC TY                       

 

                2018              KAVON SOTOAH S ARNP              Ot              282.7      

                          HEMOGLOBINOPATHIES NEC                       

 

                2018              BRITTANY HILAH S ARNP              Ot              305.1      

                          TOBACCO USE DISORDER                       

 

                2018              KAVON SOTOAH S ARNP              Ot              401.9      

                          HYPERTENSION NOS                       

 

                2018              KAVON SOTOAH S ARNP              Ot              238.4      

                          POLYCYTHEMIA VERA                       

 

                2018              KAVON SOTOAH S ARNP              Ot              250.00     

                          DIAB FABRICE WO COMPL, TYPE II OR UNSPEC TY                       

 

                2018              RHYS SOTO S ARNP              Ot              282.7      

                          HEMOGLOBINOPATHIES NEC                       

 

                2018              RHYS SOTO S ARNP              Ot              305.1      

                          TOBACCO USE DISORDER                       

 

                2018              RHYS SOTO S ARNP              Ot              401.9      

                          HYPERTENSION NOS                       

 

                2018              KATHY MARTIN, KANNAN STATON              Ot              250.92      

                          DIAB W UNSPEC COMPL, TYPE II OR UNSPEC T                       

 

                2018              RHYS SOTO S ARNP              Ot              238.4      

                          POLYCYTHEMIA VERA                       

 

                2018              RHYS SOTO S ARNP              Ot              282.7      

                          HEMOGLOBINOPATHIES NEC                       

 

                2018              RHYS SOTO S ARNP              Ot              723.0      

                          CERVICAL SPINAL STENOSIS                       

 

                2018              RHYS SOTO S ARNP              Ot              V58.69     

                          OT MED,LT,CURRENT USE                       

 

                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        

                          POLYCYTHEMIA VERA                       

 

                2018              KAVON SOTOAH S ARNP              Ot              E11.9      

                          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                       

 

                2018              KAVON SOTOAH S ARNP              Ot              F17.210    

                          NICOTINE DEPENDENCE, CIGARETTES, UNCOMPL                       

 

                2018              KAVON SOTOAH S ARNP              Ot              I10        

                          ESSENTIAL (PRIMARY) HYPERTENSION                       

 

                2018              KAVON SOTOAH S ARNP              Ot              Z79.899    

                          OTHER LONG TERM (CURRENT) DRUG THERAPY                       

 

                2018              SACHI PADILLA ARNP              Ot              Z51.81      

                          ENCOUNTER FOR THERAPEUTIC DRUG LEVEL MON                       

 

                2018              SACHI PADILLA ARNP              Ot              Z79.01      

                          LONG TERM (CURRENT) USE OF ANTICOAGULANT                       

 

                2018              SOTORHYS GOMEZ ARNP              Ot              D45        

                          POLYCYTHEMIA VERA                       

 

                2018              BRITTANY RHYS S ARNP              Ot              E11.9      

                          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                       

 

                2018              BRITTANY RHYS S ARNP              Ot              F17.210    

                          NICOTINE DEPENDENCE, CIGARETTES, UNCOMPL                       

 

                2018              SOTO RHYS S ARNP              Ot              I10        

                          ESSENTIAL (PRIMARY) HYPERTENSION                       

 

                2018              BRITTANY RHYS S ARNP              Ot              Z79.899    

                          OTHER LONG TERM (CURRENT) DRUG THERAPY                       

 

                2018              BRITTANY RHYS GOMEZ ARNP              Ot              D45        

                          POLYCYTHEMIA VERA                       

 

                2018              BRITTANY RHYS S ARNP              Ot              E11.9      

                          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                       

 

                2018              BRITTANY RHYS S ARNP              Ot              F17.210    

                          NICOTINE DEPENDENCE, CIGARETTES, UNCOMPL                       

 

                2018              SOTO RHYS S ARNP              Ot              I10        

                          ESSENTIAL (PRIMARY) HYPERTENSION                       

 

                2018              BRITTANY RHYS S ARNP              Ot              Z79.01     

                          LONG TERM (CURRENT) USE OF ANTICOAGULANT                       

 

                2018              BRITTANY RHYS S ARNP              Ot              Z79.899    

                          OTHER LONG TERM (CURRENT) DRUG THERAPY                       

 

                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        

                          POLYCYTHEMIA VERA                       

 

                2018              KAVON SOTOCHIQUIS GOMEZ ARNP              Ot              E11.9      

                          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                       

 

                2018              BRITTANY RHYS S ARNP              Ot              F17.210    

                          NICOTINE DEPENDENCE, CIGARETTES, UNCOMPL                       

 

                2018              BRITTANY RHYS S ARNP              Ot              I10        

                          ESSENTIAL (PRIMARY) HYPERTENSION                       

 

                2018              BRITTANY RHYS S ARNP              Ot              Z79.01     

                          LONG TERM (CURRENT) USE OF ANTICOAGULANT                       

 

                2018              BRITTANY RHYS S ARNP              Ot              Z79.899    

                          OTHER LONG TERM (CURRENT) DRUG THERAPY                       

 

             2018              RACHEL LEONARDO N              Ot              D45              POLYCYTHEMIA

 VERA                                            

 

             2018              RACHEL LEONARDO N              Ot              E11.9              

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          

                          NICOTINE DEPENDENCE, CIGARETTES, UNCOMPL                       

 

             2018              RACHEL LEONARDO EVERARDO              Ot              I10              ESSENTIAL

 (PRIMARY) HYPERTENSION                          

 

                2018              RACHEL LEONARDO              Ot              Z79.01           

                          LONG TERM (CURRENT) USE OF ANTICOAGULANT                       

 

                2018              RACHEL LEONARDO              Ot              Z79.899          

                          OTHER LONG TERM (CURRENT) DRUG THERAPY                       

 

                2018              JEAN CARLOS WILLETT APRN              Ot              E11.621    

                          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                       

 

                2018              JEAN CARLOS WILLETT R APRN              Ot              I70.201    

                          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                       

 

                2018              JEAN CARLOS WILLETT R APRN              Ot              I70.202    

                          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                       

 

                2018              JEAN CARLOS WILLETT R APRN              Ot              I87.2      

                          VENOUS INSUFFICIENCY (CHRONIC) (PERIPHER                       

 

                2018              JEAN CARLOS WILLETT R APRN              Ot              L97.512    

                          NON-PRS CHRONIC ULCER OTH PRT RIGHT FOOT                       

 

                2018              JEAN CARLOS WILLETT R APRN              Ot              L97.522    

                          NON-PRS CHRONIC ULCER OTH PRT LEFT FOOT                        

 

                2018              JEAN CARLOS WILLETT R APRN              Ot              E11.621    

                          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                       

 

                2018              JEAN CARLOS WILLETT R APRN              Ot              I70.203    

                          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                       

 

                2018              TAMIE JEAN CARLOS R APRN              Ot              I87.2      

                          VENOUS INSUFFICIENCY (CHRONIC) (PERIPHER                       

 

                2018              JEAN CARLOS WILLETT R APRN              Ot              L97.512    

                          NON-PRS CHRONIC ULCER OTH PRT RIGHT FOOT                       

 

                2018              JEAN CARLOS WILLETT R APRN              Ot              L97.522    

                          NON-PRS CHRONIC ULCER OTH PRT LEFT FOOT                        

 

                2018              JEAN CARLOS WILLETT APRN              Ot              Z98.890    

                          OTHER SPECIFIED POSTPROCEDURAL STATES                       

 

                2018              JEAN CARLOS WILLETT APRN              Ot              E11.621    

                          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                       

 

                2018              JEAN CARLOS WILLETT R APRN              Ot              I70.202    

                          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                       

 

                2018              TAMIE, JEAN CARLOS R APRN              Ot              I87.2      

                          VENOUS INSUFFICIENCY (CHRONIC) (PERIPHER                       

 

                2018              TAMIE JEAN CARLOS R APRN              Ot              L97.512    

                          NON-PRS CHRONIC ULCER OTH PRT RIGHT FOOT                       

 

                2018              JEAN CARLOS WILLETT R APRN              Ot              L97.522    

                          NON-PRS CHRONIC ULCER OTH PRT LEFT FOOT                        

 

                2018              JEAN CARLOS WILLETT R APRN              Ot              E11.621    

                          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                       

 

                2018              JEAN CARLOS WILLETT R APRN              Ot              I70.203    

                          [facility] ATHSCL NATIVE ARTERIES OF [facility]                       

 

                2018              TAMIE JEAN CARLOS R APRN              Ot              I87.2      

                          VENOUS INSUFFICIENCY (CHRONIC) (PERIPHER                       

 

                2018              TAMIE JEAN CARLOS R APRN              Ot              L03.032    

                          CELLULITIS OF LEFT TOE                       

 

                2018              JEAN CARLOS WILLETT R APRN              Ot              L97.512    

                          NON-PRS CHRONIC ULCER OTH PRT RIGHT FOOT                       

 

                2018              TAMIE JEAN CARLOS R APRN              Ot              L97.522    

                          NON-PRS CHRONIC ULCER OTH PRT LEFT FOOT                        

 

                2018              JEAN CARLOS WILLETT R APRN              Ot              M86.8X8    

                          OTHER OSTEOMYELITIS, OTHER SITE                       

 

                2018              TAMIE JEAN CARLOS R APRN              Ot              E11.621    

                          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                       

 

                2018              JEAN CARLOS WILLETT R APRN              Ot              I70.203    

                          [facility] ATHSCL NATIVE ARTERIES OF [facility]                       

 

                2018              JEAN CARLOS WILLETT R APRN              Ot              I87.2      

                          VENOUS INSUFFICIENCY (CHRONIC) (PERIPHER                       

 

                2018              JEAN CARLOS WILLETT R APRN              Ot              L03.032    

                          CELLULITIS OF LEFT TOE                       

 

                2018              JEAN CARLOS WILLETT R APRN              Ot              L97.512    

                          NON-PRS CHRONIC ULCER OTH PRT RIGHT FOOT                       

 

                2018              TAMIE JEAN CARLOS R APRN              Ot              L97.522    

                          NON-PRS CHRONIC ULCER OTH PRT LEFT FOOT                        

 

                2018              JEAN CARLOS WILLETT R APRN              Ot              M86.8X8    

                          OTHER OSTEOMYELITIS, OTHER SITE                       

 

                2018              JEAN CARLOS WILLETT R APRN              Ot              E11.621    

                          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                       

 

                2018              JEAN CARLOS WILLETT R APRN              Ot              I70.201    

                          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                       

 

                2018              JEAN CARLOS WILLETT R APRN              Ot              I70.202    

                          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                       

 

                2018              JEAN CARLOS WILLETT R APRN              Ot              I87.2      

                          VENOUS INSUFFICIENCY (CHRONIC) (PERIPHER                       

 

                2018              JEAN CARLOS WILLETT R APRN              Ot              L97.512    

                          NON-PRS CHRONIC ULCER OTH PRT RIGHT FOOT                       

 

                2018              TAMIE JEAN CARLOS R APRN              Ot              L97.522    

                          NON-PRS CHRONIC ULCER OTH PRT LEFT FOOT                        

 

                2018              JEAN CARLOS WILLETT R APRN              Ot              E11.621    

                          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                       

 

                2018              JEAN CARLOS WILLETT R APRN              Ot              I70.203    

                          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                       

 

                2018              TAMIE JEAN CARLOS R APRN              Ot              I87.2      

                          VENOUS INSUFFICIENCY (CHRONIC) (PERIPHER                       

 

                2018              JEAN CARLOS WILLETT R APRN              Ot              L97.512    

                          NON-PRS CHRONIC ULCER OTH PRT RIGHT FOOT                       

 

                2018              JEAN CARLOS WILLETT R APRN              Ot              L97.522    

                          NON-PRS CHRONIC ULCER OTH PRT LEFT FOOT                        

 

                2018              JEAN CARLOS WILLETT APRN              Ot              Z98.890    

                          OTHER SPECIFIED POSTPROCEDURAL STATES                       

 

                2018              JEAN CARLOS WILLETT R APRN              Ot              E11.621    

                          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                       

 

                2018              JEAN CARLOS WILLETT R APRN              Ot              I70.201    

                          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                       

 

                2018              JEAN CARLOS WILLETT R APRN              Ot              I70.202    

                          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                       

 

                2018              JEAN CARLOS WILLETT R APRN              Ot              I87.2      

                          VENOUS INSUFFICIENCY (CHRONIC) (PERIPHER                       

 

                2018              JEAN CARLOS WILLETT R APRN              Ot              L97.512    

                          NON-PRS CHRONIC ULCER OTH PRT RIGHT FOOT                       

 

                2018              TAMIE JEAN CARLOS R APRN              Ot              L97.522    

                          NON-PRS CHRONIC ULCER OTH PRT LEFT FOOT                        

 

                2018              JEAN CARLOS WILLETT R APRN              Ot              E11.621    

                          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                       

 

                2018              JEAN CARLOS WILLETT R APRN              Ot              I70.201    

                          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                       

 

                2018              JEAN CARLOS WILLETT R APRN              Ot              I70.202    

                          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                       

 

                2018              JEAN CARLOS WILLETT R APRN              Ot              I87.2      

                          VENOUS INSUFFICIENCY (CHRONIC) (PERIPHER                       

 

                2018              JEAN CARLOS WILLETT              Ot              L97.512    

                          NON-PRS CHRONIC ULCER OTH PRT RIGHT FOOT                       

 

                2018              JEAN CARLOS WILLETT APRN              Ot              L97.522    

                          NON-PRS CHRONIC ULCER OTH PRT LEFT FOOT                        

 

                2018              BLANCHO DPREJI, ROYAL YEUNG              Ot              Z01.818     

                          ENCOUNTER FOR OTHER PREPROCEDURAL EXAMIN                       

 

                2018              JEAN CARLOS WILLETT              Ot              E11.621    

                          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                       

 

                2018              JEAN CARLOS WILLETT              Ot              I70.203    

                          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                       

 

                2018              JEAN CARLOS WILLETT              Ot              I87.2      

                          VENOUS INSUFFICIENCY (CHRONIC) (PERIPHER                       

 

                2018              JEAN CARLOS WILLETT              Ot              L03.032    

                          CELLULITIS OF LEFT TOE                       

 

                2018              JEAN CARLOS WILLETT              Ot              L97.512    

                          NON-PRS CHRONIC ULCER OTH PRT RIGHT FOOT                       

 

                2018              JEAN CARLOS WILLETT              Ot              L97.522    

                          NON-PRS CHRONIC ULCER OTH PRT LEFT FOOT                        

 

                2018              JEAN CARLOS WILLETT              Ot              M86.8X8    

                          OTHER OSTEOMYELITIS, OTHER SITE                       

 

                2018              LUISHO ROYAL JERRY              Ot              E11.621     

                          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                       

 

                2018              BLANCHO DPMROYAL              Ot              E11.69      

                          TYPE 2 DIABETES MELLITUS WITH OTHER SPEC                       

 

                2018              BLANCHO DPROYAL MENDOZA              Ot              E78.00      

                          PURE HYPERCHOLESTEROLEMIA, UNSPECIFIED                       

 

                2018              BLANCHO DPROYAL MENDOZA              Ot              I10         

                          ESSENTIAL (PRIMARY) HYPERTENSION                       

 

                2018              BLANCHO DPMROYAL              Ot              J44.9       

                          CHRONIC OBSTRUCTIVE PULMONARY DISEASE, U                       

 

                2018              BLANCHO DPROYAL MENDOZA              Ot              L97.526     

                          NON-PRS CHR ULC OTH PRT L FOOT WITH BNE                        

 

                2018              KONGNCHO DPROYAL MENDOZA              Ot              M86.9       

                          OSTEOMYELITIS, UNSPECIFIED                       

 

                2018              KONGNCHO DPROYAL MENDOZA              Ot              Z79.4       

                          LONG TERM (CURRENT) USE OF INSULIN                       

 

                2018              LUISHO ROYAL JERRY Ot              Z79.899     

                          OTHER LONG TERM (CURRENT) DRUG THERAPY                       

 

                2018              JEAN CARLOS WILLETT              Ot              E11.621    

                          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                       

 

                2018              JEAN CARLOS WILLETT APRN              Ot              I70.202    

                          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                       

 

                2018              JEAN CARLOS WILLETT APRN              Ot              I87.2      

                          VENOUS INSUFFICIENCY (CHRONIC) (PERIPHER                       

 

                2018              JEAN CARLOS WILLETT R APRN              Ot              L97.512    

                          NON-PRS CHRONIC ULCER OTH PRT RIGHT FOOT                       

 

                2018              JEAN CARLOS WILLETT R APRN              Ot              L97.522    

                          NON-PRS CHRONIC ULCER OTH PRT LEFT FOOT                        

 

                2018              BLANCHO DPROYAL MENDOZA              Ot              E11.621     

                          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                       

 

                2018              BLANCHO DPMROYAL              Ot              E11.69      

                          TYPE 2 DIABETES MELLITUS WITH OTHER SPEC                       

 

                2018              BLANCHO DPMROYAL              Ot              E78.00      

                          PURE HYPERCHOLESTEROLEMIA, UNSPECIFIED                       

 

                2018              BLANCHO DPMROYAL              Ot              I10         

                          ESSENTIAL (PRIMARY) HYPERTENSION                       

 

                2018              BLANCHO DPMROYAL              Ot              J44.9       

                          CHRONIC OBSTRUCTIVE PULMONARY DISEASE, U                       

 

                2018              BLANCHO DPMROYAL              Ot              L97.526     

                          NON-PRS CHR ULC OTH PRT L FOOT WITH BNE                        

 

                2018              BLANCHO DPM, ROYAL YEUNG              Ot              M86.9       

                          OSTEOMYELITIS, UNSPECIFIED                       

 

                2018              BLANCHO DPM, ROYAL YEUNG              Ot              Z79.4       

                          LONG TERM (CURRENT) USE OF INSULIN                       

 

                2018              BLANCHO DPMROYAL              Ot              Z79.899     

                          OTHER LONG TERM (CURRENT) DRUG THERAPY                       

 

                2018              BLANCHO DPMROYAL              Ot              E11.621     

                          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                       

 

                2018              KONGNCHO DPMROYAL              Ot              E11.69      

                          TYPE 2 DIABETES MELLITUS WITH OTHER SPEC                       

 

                2018              KONGNCHO DPMORYAL              Ot              E78.00      

                          PURE HYPERCHOLESTEROLEMIA, UNSPECIFIED                       

 

                2018              BLANCHO DPMROYAL              Ot              I10         

                          ESSENTIAL (PRIMARY) HYPERTENSION                       

 

                2018              BLANCHO DPM, ROYAL YEUNG              Ot              J44.9       

                          CHRONIC OBSTRUCTIVE PULMONARY DISEASE, U                       

 

                2018              BLANCHO DPM, ROYAL YEUNG              Ot              L97.526     

                          NON-PRS [facility] OT PRT L FOOT WITH BNE                        

 

                2018              BLANCHO DPM, ROYAL YEUNG              Ot              M86.9       

                          OSTEOMYELITIS, UNSPECIFIED                       

 

                2018              BLANCHO DPM, ROYAL YEUNG              Ot              Z79.4       

                          LONG TERM (CURRENT) USE OF INSULIN                       

 

                2018              BLANCHO DPM, ROYAL YEUNG              Ot              Z79.899     

                          OTHER LONG TERM (CURRENT) DRUG THERAPY                       

 

                2018              BLANCHO DPM, ROYAL YEUNG              Ot              E11.621     

                          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                       

 

                2018              BLANCHO DPM, ROYAL YEUNG              Ot              E11.69      

                          TYPE 2 DIABETES MELLITUS WITH OTHER SPEC                       

 

                2018              BLANCHO DPM, ROYAL YEUNG              Ot              E78.00      

                          PURE HYPERCHOLESTEROLEMIA, UNSPECIFIED                       

 

                2018              BLANCHO DPM, ROYAL YEUNG              Ot              I10         

                          ESSENTIAL (PRIMARY) HYPERTENSION                       

 

                2018              BLANCHO DPM, ROYAL YEUNG              Ot              J44.9       

                          CHRONIC OBSTRUCTIVE PULMONARY DISEASE, U                       

 

                2018              BLANCHO DPM, ROYAL YEUNG              Ot              L97.526     

                          NON-PRS [facility] PRT L FOOT WITH BNE                        

 

                2018              BLANCHO DPM, ROYAL ANJANA              Ot              M86.9       

                          OSTEOMYELITIS, UNSPECIFIED                       

 

                2018              BLANCHO DPM, ROYAL YEUNG              Ot              Z79.4       

                          LONG TERM (CURRENT) USE OF INSULIN                       

 

                2018              BLANCHO DPM, ROYAL YEUNG              Ot              Z79.899     

                          OTHER LONG TERM (CURRENT) DRUG THERAPY                       

 

                2018              JEAN CARLOS WILLETT              Ot              E11.621    

                          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                       

 

                2018              JEAN CARLOS WILLETT APREVERARDO              Ot              I70.201    

                          [facility] ATHSC NATIVE ARTERIES OF [facility]                       

 

                2018              JEAN CARLOS WILLETT APRN              Ot              I70.202    

                          [facility]                       

 

                2018              JEAN CARLOS WILLETT APRN              Ot              I87.2      

                          VENOUS INSUFFICIENCY (CHRONIC) (PERIPHER                       

 

                2018              JEAN CARLOS WILLETT APRN              Ot              L97.512    

                          NON-PRS CHRONIC ULCER OTH PRT RIGHT FOOT                       

 

                2018              JEAN CARLOS WILLETT APREVERARDO              Ot              L97.522    

                          NON-PRS CHRONIC ULCER OTH PRT LEFT FOOT                        

 

                2019              JEAN CARLOS WILLETT APRN              Ot              E11.621    

                          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                       

 

                2019              JEAN CARLOS WILLETT APRN              Ot              I70.201    

                          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                       

 

                2019              JEAN CARLOS WILLETT APRN              Ot              I70.202    

                          [facility]                       

 

                [facility]              JEAN CARLOS WILLETT APRN              Ot              I87.2      

                          VENOUS INSUFFICIENCY (CHRONIC) (PERIPHER                       

 

                2019              JEAN CARLOS WILLETT APRN              Ot              L97.512    

                          NON-PRS CHRONIC ULCER OTH PRT RIGHT FOOT                       

 

                2019              JEAN CARLOS WILLETT R APRN              Ot              L97.522    

                          NON-PRS CHRONIC ULCER OTH PRT LEFT FOOT                        

 

             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

 VERA                                            

 

             2019              RACHEL LEONARDO N              Ot              E11.9              

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                       

 

                2019              RACHEL LEONARDO N              Ot              F17.210          

                          NICOTINE DEPENDENCE, CIGARETTES, UNCOMPL                       

 

             2019              RACHEL LEONARDO              Ot              I10              ESSENTIAL

 (PRIMARY) HYPERTENSION                          

 

                2019              RACHEL LEONARDO N              Ot              Z79.01           

                          LONG TERM (CURRENT) USE OF ANTICOAGULANT                       

 

                2019              RACHEL LEONARDO N              Ot              Z79.899          

                          OTHER LONG TERM (CURRENT) DRUG THERAPY                       

 

             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

 VERA                                            

 

             2019              RACHEL LEONARDO N              Ot              E11.9              

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                       

 

                2019              RACHEL LEONARDO N              Ot              F17.210          

                          NICOTINE DEPENDENCE, CIGARETTES, UNCOMPL                       

 

             2019              RACHEL LEONARDO N              Ot              I10              ESSENTIAL

 (PRIMARY) HYPERTENSION                          

 

                2019              RACHEL LEONARDO N              Ot              Z79.01           

                          LONG TERM (CURRENT) USE OF ANTICOAGULANT                       

 

                2019              RACHEL LEONARDO N              Ot              Z79.899          

                          OTHER LONG TERM (CURRENT) DRUG THERAPY                       

 

             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

 VERA                                            

 

             2019              RACHEL LEONARDO N              Ot              E11.9              

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                       

 

                2019              RACHEL LEONARDO N              Ot              F17.210          

                          NICOTINE DEPENDENCE, CIGARETTES, UNCOMPL                       

 

             2019              RACHEL LEONARDO N              Ot              I10              ESSENTIAL

 (PRIMARY) HYPERTENSION                          

 

                2019              RACHEL LEONARDO N              Ot              Z79.01           

                          LONG TERM (CURRENT) USE OF ANTICOAGULANT                       

 

                2019              RACHEL LEONARDO              Ot              Z79.899          

                          OTHER LONG TERM (CURRENT) DRUG THERAPY                       

 

                2019              JEAN CARLOS WILLETT              Ot              E11.621    

                          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                       

 

                2019              JEAN CARLOS WILLETT              Ot              I87.2      

                          VENOUS INSUFFICIENCY (CHRONIC) (PERIPHER                       

 

                2019              JEAN CARLOS WILLETT              Ot              L89.511    

                          PRESSURE ULCER OF RIGHT ANKLE, STAGE 1                       

 

                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                       

 

                03/15/2019              SHARA NETTLES MD, Ot              L97.312      

                          NON-PRS CHRONIC ULCER OF RIGHT ANKLE W F                       

 

                2019              SHRAA NETTLES MD, Ot              E11.621      

                          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      

                          HEMIPLGA FOLLOWING CEREBRAL INFRC AFF RI                       

 

                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                       

 

                2019              SHARA NETTLES MD, Ot              I87.2        

                          VENOUS INSUFFICIENCY (CHRONIC) (PERIPHER                       

 

                2019              SHARA NETTLES MD, Ot              L97.312      

                          NON-PRS CHRONIC ULCER OF RIGHT ANKLE W F                       

 

                2019              SHARA NETTLES MD, Ot              E11.621      

                          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      

                          HEMIPLGA FOLLOWING CEREBRAL INFRC AFF RI                       

 

                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                       

 

                2019              SHARA NETTLES MD, Ot              I87.2        

                          VENOUS INSUFFICIENCY (CHRONIC) (PERIPHER                       

 

                2019              SHARA NETTLES MD, Ot              L97.312      

                          NON-PRS CHRONIC ULCER OF RIGHT ANKLE W F                       

 

                2019              JEAN CARLOS WILLETT              Ot              E11.621    

                          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    

                          HEMIPLGA FOLLOWING CEREBRAL INFRC AFF RI                       

 

                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                       

 

                2019              TAMIE, JEAN CARLOS R APRN              Ot              I87.2      

                          VENOUS INSUFFICIENCY (CHRONIC) (PERIPHER                       

 

                2019              JEAN CARLOS WILLETT R APRN              Ot              L97.312    

                          NON-PRS CHRONIC ULCER OF RIGHT ANKLE W F                       

 

                2019              JEAN CARLOS WILLETT APRN              Ot              E11.621    

                          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    

                          HEMIPLGA FOLLOWING CEREBRAL INFRC AFF RI                       

 

                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                       

 

                2019              TAMIE JEAN CARLOS R APRN              Ot              I87.2      

                          VENOUS INSUFFICIENCY (CHRONIC) (PERIPHER                       

 

                2019              JEAN CARLOS WILLETT R APRN              Ot              L97.312    

                          NON-PRS CHRONIC ULCER OF RIGHT ANKLE W F                       

 

                2019              JEAN CARLOS WILLETT APRN              Ot              E11.621    

                          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                       

 

                2019              JEAN CARLOS WILLETT APRN              Ot              I87.2      

                          VENOUS INSUFFICIENCY (CHRONIC) (PERIPHER                       

 

                2019              JEAN CARLOS WILLETT APRN              Ot              L89.511    

                          PRESSURE ULCER OF RIGHT ANKLE, STAGE 1                       

 

                2019              JEAN CARLOS WILLETT APRN              Ot              E11.621    

                          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    

                          HEMIPLGA FOLLOWING CEREBRAL INFRC AFF RI                       

 

                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                       

 

                2019              JEAN CARLOS WILLETT APRN              Ot              I87.2      

                          VENOUS INSUFFICIENCY (CHRONIC) (PERIPHER                       

 

                2019              JEAN CARLOS WILLETT APRN              Ot              L97.312    

                          NON-PRS CHRONIC ULCER OF RIGHT ANKLE W F                       

 

                2019              SHARA NETTLES MD              Ot              E11.621      

                          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      

                          HEMIPLGA FOLLOWING CEREBRAL INFRC AFF RI                       

 

                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                       

 

                2019              SHARA NETTLES MD              Ot              I87.2        

                          VENOUS INSUFFICIENCY (CHRONIC) (PERIPHER                       

 

                2019              SHARA NETTLES MD              Ot              L97.312      

                          NON-PRS CHRONIC ULCER OF RIGHT ANKLE W F                       

 

                2019              JEAN CARLOS WILLETT APRN              Ot              E11.621    

                          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    

                          HEMIPLGA FOLLOWING CEREBRAL INFRC AFF RI                       

 

                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                       

 

                2019              JEAN CARLOS WILLETT APRN              Ot              I87.2      

                          VENOUS INSUFFICIENCY (CHRONIC) (PERIPHER                       

 

                2019              JEAN CARLOS WILLETT APRN              Ot              L97.312    

                          NON-PRS CHRONIC ULCER OF RIGHT ANKLE W F                       

 

                04/10/2019              JEAN CARLOS WILLETT APRN              Ot              E11.621    

                          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    

                          HEMIPLGA FOLLOWING CEREBRAL INFRC AFF RI                       

 

                04/10/2019              JEAN CARLOS WILLETT APRN              Ot              I70.233    

                          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                       

 

                04/10/2019              JEAN CARLOS WILLETT APRN              Ot              I87.2      

                          VENOUS INSUFFICIENCY (CHRONIC) (PERIPHER                       

 

                04/10/2019              JEAN CARLOS WILLETT APRN              Ot              L97.312    

                          NON-PRS CHRONIC ULCER OF RIGHT ANKLE W F                       

 

                2019              SHARA NETTLES MD              Ot              E11.621      

                          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      

                          HEMIPLGA FOLLOWING CEREBRAL INFRC AFF RI                       

 

                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                       

 

                2019              SHARA NETTLES MD              Ot              I87.2        

                          VENOUS INSUFFICIENCY (CHRONIC) (PERIPHER                       

 

                2019              SHARA NETTLES MD              Ot              L97.312      

                          NON-PRS CHRONIC ULCER OF RIGHT ANKLE W F                       

 

                2019              JEAN CARLOS WILLETT APREVERARDO              Ot              E11.621    

                          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    

                          HEMIPLGA FOLLOWING CEREBRAL INFRC AFF RI                       

 

                2019              JEAN CARLOS WILLETT APREVERARDO              Ot              I70.233    

                          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                       

 

                2019              JEAN CARLOS WILLETT APREVERARDO              Ot              I87.2      

                          VENOUS INSUFFICIENCY (CHRONIC) (PERIPHER                       

 

                2019              JEAN CARLOS WILLETT APRN              Ot              L97.312    

                          NON-PRS CHRONIC ULCER OF RIGHT ANKLE W F                       

 

                2019              JEAN CARLOS WILLETT APRN              Ot              E11.621    

                          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    

                          HEMIPLGA FOLLOWING CEREBRAL INFRC AFF RI                       

 

                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                       

 

                2019              JEAN CARLOS WILLETT R APRN              Ot              I87.2      

                          VENOUS INSUFFICIENCY (CHRONIC) (PERIPHER                       

 

                2019              JEAN CARLOS WILLETT R APRN              Ot              L97.312    

                          NON-PRS CHRONIC ULCER OF RIGHT ANKLE W F                       

 

                2019              JEAN CARLOS WILLETT APRN              Ot              E11.621    

                          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    

                          HEMIPLGA FOLLOWING CEREBRAL INFRC AFF RI                       

 

                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                       

 

                2019              JEAN CARLOS WILLETT APRN              Ot              I87.2      

                          VENOUS INSUFFICIENCY (CHRONIC) (PERIPHER                       

 

                2019              JEAN CARLOS WILLETT R APRN              Ot              L97.312    

                          NON-PRS CHRONIC ULCER OF RIGHT ANKLE W F                       

 

                2019              JEAN CARLOS WILLETT R APRN              Ot              E11.621    

                          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    

                          HEMIPLGA FOLLOWING CEREBRAL INFRC AFF RI                       

 

                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                       

 

                2019              JEAN CARLOS WILLETT APRN              Ot              I87.2      

                          VENOUS INSUFFICIENCY (CHRONIC) (PERIPHER                       

 

                2019              JEAN CARLOS WILLETT APRN              Ot              L97.312    

                          NON-PRS CHRONIC ULCER OF RIGHT ANKLE W F                       

 

                2019              JEAN CARLOS WILLETT APRN              Ot              E11.621    

                          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    

                          HEMIPLGA FOLLOWING CEREBRAL INFRC AFF RI                       

 

                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                       

 

                2019              JEAN CARLOS WILLETT R APRN              Ot              I87.2      

                          VENOUS INSUFFICIENCY (CHRONIC) (PERIPHER                       

 

                2019              JEAN CARLOS WILLETT R APRN              Ot              L97.312    

                          NON-PRS CHRONIC ULCER OF RIGHT ANKLE W F                       

 

                2019              JEAN CARLOS WILLETT APREVERARDO              Ot              E11.621    

                          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    

                          HEMIPLGA FOLLOWING CEREBRAL INFRC AFF RI                       

 

                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                       

 

                2019              JEAN CARLOS WILLETT              Ot              I87.2      

                          VENOUS INSUFFICIENCY (CHRONIC) (PERIPHER                       

 

                2019              JEAN CARLOS WILLETT APREVERARDO              Ot              L97.312    

                          NON-PRS CHRONIC ULCER OF RIGHT ANKLE W F                       

 

             2019              MALISSAABDULKADIR LOYALYNN MCGEE              Ot              D45              POLYCYTHEMIA

 VERA                                            

 

             2019              MALISSARACHEL              Ot              E11.9              

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                       

 

                2019              MALISSARACHEL LOYA EVERARDO              Ot              F17.210          

                          NICOTINE DEPENDENCE, CIGARETTES, UNCOMPL                       

 

             2019              MALISSA RACHEL MCGEE              Ot              I10              ESSENTIAL

 (PRIMARY) HYPERTENSION                          

 

                2019              MALISSAABDULKADIR LOYALYNN MCGEE              Ot              Z79.01           

                          LONG TERM (CURRENT) USE OF ANTICOAGULANT                       

 

                2019              RACHEL LEONARDO EVERARDO              Ot              Z79.899          

                          OTHER LONG TERM (CURRENT) DRUG THERAPY                       

 

             2019              ABDULKADIR LEONARDOLYNN MCGEE              Ot              D45              POLYCYTHEMIA

 VERA                                            

 

             2019              RACHEL LEONARDO EVERARDO              Ot              E11.9              

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                       

 

                2019              RACHEL LEONARDO EVERARDO              Ot              F17.210          

                          NICOTINE DEPENDENCE, CIGARETTES, UNCOMPL                       

 

             2019              MALISSA ABDULKADIRLYNN EVERARDO              Ot              I10              ESSENTIAL

 (PRIMARY) HYPERTENSION                          

 

                2019              ABDULKADIR LEONARDOLYNN MCGEE              Ot              Z79.01           

                          LONG TERM (CURRENT) USE OF ANTICOAGULANT                       

 

                2019              RACHEL LEONARDO EVERARDO              Ot              Z79.899          

                          OTHER LONG TERM (CURRENT) DRUG THERAPY                       



                                                                                
                                                                                
                                                                                
                                                                                
                                                                                
                                                                                
                                                                                
                                                                                
                                                                                
                                                                                
                                                                                
                                                                                
                                                                                
                                                                                
                                                                                
                                                                                
                                                                                
                                                                                
                                                                                
                                                                                
                                                                                
                                                                                
                                                                                
                                                                                
                                                                                
                                                                                
                                                                                
                                                                                
                                                                                
                                                           



Procedures

      



There is no data.                  



Results

      





                    Test                Result              Range        









                                        Hemoglobin A1c - 18 11:30         









                    Blood hemoglobin A1C measurement (mass/volume)              9.4 %               4.0-5.6

        

 

                    MEAN BLOOD GLUCOSE              223 %               <=126        









                                        Bacteria identification in isolate by anaerobe culture - 18 11:12         











                          Bacteria identification in isolate by anaerobe culture              NOANA         

                                        NRG        









                                        Gram stain microscopy - 18 11:12         









                    Gram stain microscopy              No bacteria seen               NRG        









                                        Bacteria identification in wound by culture - 18 11:12         









                    Bacteria identification in wound by culture              [id number]               NRG  

      

 

                    FREE TEXT EXTERNAL              ID REPORTED 18 11:05               NRG        



 

                    QUANTITY OF GROWTH              FEW                 NRG        

 

                    FREE TEXT ENTRY 2              SENSITIVITY REPORTED 11/15/18 15:05               NRG

        

 

                          CALL POSITIVES (F1 HELP)              MRSA CALLED TO ANGELA/WC 11/15/18 15:40 BY [facility]

                                        NRG        









                                        RML Sensitivity Panel - 18 11:12         









                          Oxacillin susceptibility test by minimum inhibitory concentration              R  

                                        NRG        

 

                          Clindamycin susceptibility test by minimum inhibitory concentration              >

                                        NRG        

 

                          Erythromycin susceptibility test by minimum inhibitory concentration              

>                                       NRG        

 

                                        Trimethoprim/sulfamethoxazole susceptibility test by minimum inhibitoryconcentration

                          S                         NRG        

 

                          Vancomycin susceptibility test by minimum inhibitory concentration              1 

                                        NRG        

 

                          Levofloxacin susceptibility test by minimum inhibitory concentration              

>                                       NRG        

 

                          Rifampin susceptibility test by minimum inhibitory concentration              <=  

                                        NRG        

 

                          Cefazolin susceptibility test by minimum inhibitory concentration              >  

                                        NRG        

 

                          Linezolid susceptibility test by minimum inhibitory concentration              <= 

                                        NRG        

 

                          Penicillin G susceptibility test by minimum inhibitory concentration              

>                                       NRG        

 

                    Minocycline susc SARITHA              <=                  NRG        









                                        Capillary blood glucose measurement by glucometer (mass/volume) - 18 10:08

         









                          Capillary blood glucose measurement by glucometer (mass/volume)              208 mg/dL

                                                









                                        Methicillin resistant Staphylococcus aureus (MRSA) screening culture - 18 

10:15         









                          Methicillin resistant Staphylococcus aureus (MRSA) screening culture              

NEG                                     NRG        









                                        Complete blood count (CBC) with automated white blood cell (WBC) differential - 

18 10:50         









                          Blood leukocytes automated count (number/volume)              11.2 10*3/uL        

                                        4.3-11.0        

 

                          Blood erythrocytes automated count (number/volume)              4.36 10*6/uL      

                                        4.35-5.85        

 

                          Venous blood hemoglobin measurement (mass/volume)              13.8 g/dL          

                                        13.3-17.7        

 

                    Blood hematocrit (volume fraction)              42 %                40-54        

 

                    Automated erythrocyte mean corpuscular volume              96 [foz_us]              

80-99        

 

                                        Automated erythrocyte mean corpuscular hemoglobin (mass per erythrocyte)        

                          32 pg                     25-34        

 

                                        Automated erythrocyte mean corpuscular hemoglobin concentration measurement (mass/volume)

                          33 g/dL                   32-36        

 

                    Automated erythrocyte distribution width ratio              19.2 %              10.0-

14.5        

 

                    Automated blood platelet count (count/volume)              617 10*3/uL              

130-400        

 

                          Automated blood platelet mean volume measurement              9.7 [foz_us]        

                                        7.4-10.4        

 

                    Automated blood neutrophils/100 leukocytes              85 %                42-75     

   

 

                    Automated blood lymphocytes/100 leukocytes              8 %                 12-44      

  

 

                    Blood monocytes/100 leukocytes              3 %                 0-12        

 

                    Automated blood eosinophils/100 leukocytes              2 %                 0-10       

 

 

                    Automated blood basophils/100 leukocytes              2 %                 0-10        

 

                          Blood neutrophils automated count (number/volume)              9.5 10*3           

                                        1.8-7.8        

 

                          Blood lymphocytes automated count (number/volume)              0.9 10*3           

                                        1.0-4.0        

 

                    Blood monocytes automated count (number/volume)              0.4 10*3              0.0-

1.0        

 

                    Automated eosinophil count              0.3 10*3/uL              0.0-0.3        

 

                    Automated blood basophil count (count/volume)              0.2 10*3/uL              

0.0-0.1        









                                        Bacteria identification in isolate by anaerobe culture - 19 10:20         











                          Bacteria identification in isolate by anaerobe culture              NOANA         

                                        NRG        









                                        Gram stain microscopy - 19 10:20         









                    Gram stain microscopy              3-29-19, 06.               NR        









                                        Bacteria identification in wound by culture - 19 10:20         









                    Bacteria identification in wound by culture              [id number]               NRG  

      

 

                    FREE TEXT EXTERNAL              SENSITIVITY REPORTED 3/30/19 10:05               NRG

        

 

                    QUANTITY OF GROWTH              FEW                 NRG        

 

                    FREE TEXT ENTRY 2              ID REPORTED 3/29/10 8:05               NRG        

 

                    FREE TEXT ENTRY 3              SEE COMMENTS               NRG        









                                        RML Sensitivity Panel - 19 10:20         









                          Oxacillin susceptibility test by minimum inhibitory concentration              >  

                                        NRG        

 

                          Clindamycin susceptibility test by minimum inhibitory concentration              >

                                        NRG        

 

                          Erythromycin susceptibility test by minimum inhibitory concentration              

>                                       NRG        

 

                                        Trimethoprim/sulfamethoxazole susceptibility test by minimum inhibitoryconcentration

                          S                         NRG        

 

                          Vancomycin susceptibility test by minimum inhibitory concentration              1 

                                        NRG        

 

                          Levofloxacin susceptibility test by minimum inhibitory concentration              

>                                       NRG        

 

                          Rifampin susceptibility test by minimum inhibitory concentration              <=  

                                        NRG        

 

                          Cefazolin susceptibility test by minimum inhibitory concentration              >  

                                        NRG        

 

                          Linezolid susceptibility test by minimum inhibitory concentration              2  

                                        NRG        

 

                          Penicillin G susceptibility test by minimum inhibitory concentration              

>                                       NRG        

 

                    Minocycline susc SARITHA              <=                  NRG        









                                        Complete blood count (CBC) with automated white blood cell (WBC) differential - 

19 11:19         









                          Blood leukocytes automated count (number/volume)              7.6 10*3/uL         

                                        4.3-11.0        

 

                          Blood erythrocytes automated count (number/volume)              3.80 10*6/uL      

                                        4.35-5.85        

 

                          Venous blood hemoglobin measurement (mass/volume)              12.3 g/dL          

                                        13.3-17.7        

 

                    Blood hematocrit (volume fraction)              39 %                40-54        

 

                          Automated erythrocyte mean corpuscular volume              101 [foz_us]           

                                        80-99        

 

                                        Automated erythrocyte mean corpuscular hemoglobin (mass per erythrocyte)        

                          32 pg                     25-34        

 

                                        Automated erythrocyte mean corpuscular hemoglobin concentration measurement (mass/volume)

                          32 g/dL                   32-36        

 

                    Automated erythrocyte distribution width ratio              21.1 %              10.0-

14.5        

 

                    Automated blood platelet count (count/volume)              50 10*3/uL              130-

400        

 

                    Automated blood neutrophils/100 leukocytes              83 %                42-75     

   

 

                    Automated blood lymphocytes/100 leukocytes              11 %                12-44     

   

 

                    Blood monocytes/100 leukocytes              1 %                 0-12        

 

                    Automated blood eosinophils/100 leukocytes              4 %                 0-10       

 

 

                    Automated blood basophils/100 leukocytes              1 %                 0-10        

 

                          Blood neutrophils automated count (number/volume)              6.3 10*3           

                                        1.8-7.8        

 

                          Blood lymphocytes automated count (number/volume)              0.8 10*3           

                                        1.0-4.0        

 

                    Blood monocytes automated count (number/volume)              0.1 10*3              0.0-

1.0        

 

                    Automated eosinophil count              0.3 10*3/uL              0.0-0.3        

 

                    Automated blood basophil count (count/volume)              0.1 10*3/uL              

0.0-0.1        









                                        Comprehensive metabolic panel - 19 11:19         









                          Serum or plasma sodium measurement (moles/volume)              135 mmol/L         

                                        135-145        

 

                          Serum or plasma potassium measurement (moles/volume)              4.6 mmol/L      

                                        3.6-5.0        

 

                          Serum or plasma chloride measurement (moles/volume)              95 mmol/L        

                                                

 

                    Carbon dioxide              29 mmol/L              21-32        

 

                          Serum or plasma anion gap determination (moles/volume)              11 mmol/L     

                                        5-14        

 

                          Serum or plasma urea nitrogen measurement (mass/volume)              15 mg/dL     

                                        7-18        

 

                          Serum or plasma creatinine measurement (mass/volume)              0.83 mg/dL      

                                        0.60-1.30        

 

                    Serum or plasma urea nitrogen/creatinine mass ratio              18                  NRG

        

 

                                        Serum or plasma creatinine measurement with calculation of estimated glomerular 

filtration rate              >                         NRG        

 

                          Serum or plasma glucose measurement (mass/volume)              198 mg/dL          

                                                

 

                          Serum or plasma calcium measurement (mass/volume)              9.5 mg/dL          

                                        8.5-10.1        

 

                          Serum or plasma total bilirubin measurement (mass/volume)              0.7 mg/dL  

                                        0.1-1.0        

 

                                        Serum or plasma alkaline phosphatase measurement (enzymatic activity/volume)    

                          95 U/L                            

 

                                        Serum or plasma aspartate aminotransferase measurement (enzymatic activity/volume)

                          17 U/L                    5-34        

 

                                        Serum or plasma alanine aminotransferase measurement (enzymatic activity/volume)

                          15 U/L                    0-55        

 

                          Serum or plasma protein measurement (mass/volume)              7.7 g/dL           

                                        6.4-8.2        

 

                          Serum or plasma albumin measurement (mass/volume)              4.1 g/dL           

                                        3.2-4.5        

 

                    CALCIUM CORRECTED              9.4 mg/dL              8.5-10.1        









                                        Serum ragweed IgE antibody assay - 19 11:19         









                    Serum ragweed IgE antibody assay              218 U/L              125-220        









                                        Serum ragweed IgE antibody assay - 19 11:19         









                    [id number]              1.5 %               0.0-5.0        



                                              



Encounters

      





                ACCT No.              Visit Date/Time              Discharge              Status      

                Pt. Type              Provider              Facility              Loc./Unit      

                                        Complaint        

 

                    [id number]              2019 10:06:00              2019 23:59:59      

                CLS              Outpatient              RACHEL LEONARDO EVERARDO              [facility]              [facility]                                

 

                    [id number]              2019 14:45:00              2019 00:01:00      

                DIS              Outpatient              RACHEL LEONARDO EVERARDO              [facility]              [facility]                                

 

                    [id number]              2019 08:52:00              2019 23:59:59      

                CLS              Outpatient              JEAN CARLOS WILLETT APRN              [facility]              [facility]                          

 

                    [id number]              2019 10:15:00              2019 23:59:59      

                CLS              Outpatient              JEAN CARLOS WILLETT APRN              [facility]              [facility]                          

 

                    [id number]              2019 09:56:00              2019 23:59:59      

                CLS              Outpatient              JEAN CARLOS WILLETT APRN              [facility]              [facility]                          

 

                    [id number]              2019 09:58:00              2019 23:59:59      

                CLS              Outpatient              JEAN CARLOS WILLETT APRN              [facility]              [facility]                          

 

                    [id number]              2019 10:00:00              2019 23:59:59      

                CLS              Outpatient              JEAN CARLOS WILLETT              [facility]              [facility]                          

 

                    [id number]              2019 14:54:00              2019 23:59:59      

                CLS              Outpatient              SHARA NETTLES MD              [facility]              RAD                       ATHEROSCLEROSIS OF NATIVE ARTERIES

 OF RIGHT LEG        

 

                    [id number]              2019 09:59:00              2019 23:59:59      

                CLS              Outpatient              SHARA NETTLES MD              [facility]              [facility]                          

 

                    [id number]              2019 08:00:00              2019 23:59:59      

                CLS              Outpatient              JEAN CARLOS WILLETT              [facility]              [facility]                          

 

                    [id number]              2018 10:30:00              2019 00:01:00      

                DIS              Outpatient              RACHEL LEONARDO              [facility]              ONC                                

 

                    [id number]              2018 09:20:00              2018 14:00:00      

                DIS              Outpatient              BLANCHO DPROYAL MENDOZA              [facility]              SDC                       OSTEOMYLILTIS LEFT SECOND TOE    

    

 

                    [id number]              2018 14:49:00              2018 16:30:00      

                DIS              Outpatient              BLANCHO DPROYAL MENDOZA              [facility]              PREOP                     AMPUTATION LEFT SECOND TOE     

   

 

                    [id number]              2018 09:48:00              2018 23:59:59      

                CLS              Outpatient              JEAN CARLOS WILLETT APRN              [facility]              [facility]                          

 

                    [id number]              2018 09:58:00              2018 23:59:59      

                CLS              Outpatient              JEAN CARLOS WILLETT APRN              [facility]              [facility]                          

 

                    [id number]              2018 12:21:00              2018 23:59:59      

                CLS              Outpatient              JEAN CARLOS WILLETT APRN              [facility]              RAD                       TYPE 2 DIABETES MELLITUS W/ FOOT 

ULCER        

 

                    [id number]              2018 09:58:00              2018 23:59:59      

                CLS              Outpatient              JEAN CARLOS WILLETT APRN              [facility]              [facility]                          

 

                    [id number]              2018 11:25:00              2018 23:59:59      

                CLS              Outpatient              JEAN CARLOS WILLETT              [facility]              RAD                       E11.621        

 

                    [id number]              2018 09:25:00              2018 23:59:59      

                CLS              Outpatient              JEAN CARLOS WILLETT              [facility]              WOUNDCARE                          

 

                    [id number]              2018 14:42:00              2018 00:01:00      

                DIS              Outpatient              RACHEL LEONARDO N              [facility]              ONC                                

 

                    [id number]              2018 12:44:00              06/10/2018 00:01:00      

                DIS              Outpatient              RACHEL LEONARDO              [facility]              ONC                                

 

                    [id number]              2017 12:58:00              2018 00:01:00      

                DIS              Outpatient              RACHEL LEONARDO              [facility]              ONC                                

 

                    [id number]              2017 14:49:00              2017 00:01:00      

                DIS              Outpatient              RACHEL LEONARDO N              [facility]              ONC                                

 

                    [id number]              2017 12:59:00              2017 00:01:00      

                DIS              Outpatient              RACHEL LEONARDO N              [facility]              ONC                                

 

                    [id number]              2016 09:07:00              2017 00:01:00      

                DIS              Outpatient              RACHEL LEONARDO              [facility]              ONC                                

 

                    [id number]              2016 09:56:00              10/10/2016 13:24:00      

                DIS              Outpatient              RACHEL LEONARDO N              [facility]              ONC                                

 

                    [id number]              2016 09:29:00              2016 23:59:59      

                CLS              Outpatient              RHYS SOTO              [facility]              ONC                                

 

                    [id number]              2016 11:57:00              2016 14:20:00      

                DIS              Emergency              JUDE CHOI MD              [facility]              ER                        SYNCOPAL EPISODE        

 

                    [id number]              2016 12:45:00              2016 00:01:00      

                DIS              Outpatient              RACHEL LEONARDO              [facility]              ONC                                

 

                    [id number]              2016 11:00:00              2016 23:59:59      

                CLS              Outpatient              RHYS SOTO ARNP              [facility]              ONC                                

 

                    [id number]              2016 09:03:00              2016 23:59:59      

                CLS              Outpatient              RHYS SOTO ARNP              [facility]              ONC                                

 

                    [id number]              2016 10:42:00              2016 23:59:59      

                CLS              Outpatient              DANNASACHI GREENE FLORIN ARNP              [facility]              LAB                                

 

                    [id number]              2016 08:23:00              2016 23:59:59      

                CLS              Outpatient              RACHEL LEONARDO VEERARDO              [facility]              ONC                                

 

                    [id number]              12/15/2015 09:04:00              12/15/2015 23:59:59      

                CLS              Outpatient              RHYS SOTO S ARNP              [facility]              ONC                                

 

                    [id number]              2015 10:19:00              2015 00:01:00      

                DIS              Outpatient              RACHEL LEONARDO EVERARDO              [facility]              ONC                                

 

                    [id number]              2015 10:16:00              2015 23:59:59      

                CLS              Outpatient              RHYS SOTO ARNP              [facility]              ONC                                

 

                    [id number]              2015 10:39:00              2015 00:01:00      

                DIS              Outpatient              RACHEL LEONARDO EVERARDO              [facility]              ONC                                

 

                    [id number]              2015 12:08:00              2015 23:59:59      

                CLS              Outpatient              KANNAN CHAVEZ MD              [facility]              LAB                                

 

                    [id number]              2015 09:10:00              2015 23:59:59      

                CLS              Outpatient              RHYS SOTO ARNP              [facility]              ONC                                

 

                    [id number]              04/10/2015 16:07:00              2015 00:01:00      

                DIS              Outpatient              MALISSA RACHEL MCGEE              [facility]              ONC                                

 

                    [id number]              2015 09:05:00              2015 23:59:59      

                CLS              Outpatient              RHYS SOTO              [facility]              ONC                                

 

                    [id number]              2015 09:19:00              2015 00:01:00      

                DIS              Outpatient              RACHEL LEONARDO              [facility]              ONC                                

 

                    [id number]              10/20/2014 14:26:00              10/20/2014 23:59:59      

                CLS              Outpatient              RHYS SOTO              [facility]              ONC                                

 

                    [id number]              2014 10:05:00              10/19/2014 00:01:00      

                DIS              Outpatient              RACHEL LEONARDO              [facility]              ONC                                

 

                    [id number]              2014 09:50:00              2014 00:01:00      

                DIS              Outpatient              RACHEL LEONARDO              [facility]              ONC                                

 

                    [id number]              2014 14:29:00              2014 23:59:59      

                CLS              Outpatient              RACHEL LEONARDO              [facility]              FS

## 2019-07-21 NOTE — NUR
pt back from ct/xr. pt alert gcs 15. pt relates less pain but pain right leg 
continues. family x 2 in the room. pt denies dyspnea and no acute sighns of 
dyspnea noted though resp remain shallow nonlabored. dr in room talking to 
family about dx. bp machine is 155/86 ausc hr 104 reg ausc resp20 recheck temp 
98.9 p ox 3/n/c is 95.tele shows st 112 though can barely see p waves.

## 2019-07-21 NOTE — DIAGNOSTIC IMAGING REPORT
EXAMINATION: Right knee, 3 views.



INDICATION: Right knee pain after trauma.



COMPARISON: None available.



FINDINGS: There is osteopenia of the visualized osseous

structures, limiting detailed evaluation. No fracture or acute

osseous abnormality is appreciated. There is marked

tricompartmental degenerative change noted. The alignment is

maintained. Vascular calcifications are noted. There is

suggestion of a small suprapatellar effusion.



IMPRESSION: Severe tricompartmental degenerative changes are

noted, without acute fracture or dislocation appreciated.

Suggestion of suprapatellar joint effusion. 



Dictated by: 



  Dictated on workstation # [id number]

## 2019-07-21 NOTE — NUR
sitting in package already.  pt apparantely came by rescue. pt here with 
daughter per daughter. pt alert gcs 14 -15 neg 1 for confusion. family says pt 
occasionally confused. family takes care of pt at home. pt is paralyzed on 
bilateral right side. family was moving pt by [place] lift and it malfunctioned 
and pt fell landing on right side. neg loc per family. pt c/o pain right arm, 
right hip, pain rating 10. pt denies dypsnea and no acute sighns of dyspnea 
noted. someone must have drawn labs . they are pt has ems iv liter ns gone d/cd 
by me. 20g left hand. bp machine is 167/107 ausc hr 100 reg ausc resp 20 
shallow nonlabored. recheck temp 99.5 p ox pt on 3 n/c is 93. pt on tele 
already shows st 111. lungs cta with decreased aeration bilaterally. pt abd 
distended. family says pt has grafts in abd. family helping pt with urinal w/o 
success of ua. no obvious injury noted in my exam though pt right leg is 
shortended and right foot is externally rotated. right leg is warm but i cant 
get pulse and family says sometimes its hard to get pulse in pt both feet. done 
seing pt at 1720.

## 2019-07-21 NOTE — DIAGNOSTIC IMAGING REPORT
Examination: Right forearm, 2 views



Indication: Right upper extremity trauma sustained during fall.



Comparison: None available.



Findings:

No fracture or acute osseous abnormality is appreciated. Bony

alignment is maintained. No elbow joint effusion is appreciated.

Vascular calcifications are noted. Scattered punctate hyperdense

foci in the soft tissues possibly reflect retained foreign

bodies.



Impression:

No acute fracture or dislocation is appreciated.



Dictated by: 



  Dictated on workstation # [id number]

## 2019-07-21 NOTE — NUR
PT REMAINS ALERT GCS 15. FAMILY LEFT. AWAITING ADMIT. PT STILL C/O PAIN RIGHT 
LEG ONLY " ITS STILL THERE". DENIES DYSPNEA AND NO ACUTE SIGHNS OF DYSPNEA 
NOTED THOUGH RESP REMAIN SHALLOW NONLABORED. TELE SHOWS . FAMILY SAID 
EARLIER PT WILL HOLLER FOR HELP AND WONT USE CALL LIGHT.

## 2019-07-21 NOTE — DIAGNOSTIC IMAGING REPORT
Examination: Right tibia/fibula, frontal and lateral views



Indication: Right lower extremity trauma sustained during fall.



Comparison: None available.



Finding:

Mild osteopenia in the visualized osseous structures limits

detailed evaluation. No fracture or acute osseous abnormality is

identified. Bony alignment is maintained. Marked degenerative

change is noted in the knee and ankle joint. Soft tissues are

unremarkable. Vascular calcifications are noted.



Impression:

No acute fracture or dislocation is appreciated.



Dictated by: 



  Dictated on workstation # [id number]

## 2019-07-21 NOTE — DIAGNOSTIC IMAGING REPORT
PROCEDURE: CT abdomen and pelvis without contrast.



TECHNIQUE: Multiple contiguous axial images were obtained through

the abdomen and pelvis without the use of intravenous contrast.

Auto Exposure Controls were utilized during the CT exam to meet

ALARA standards for radiation dose reduction. 



INDICATION: Trauma sustained during fall.



COMPARISON: None available.



FINDINGS:

Multiple images are degraded by patient motion and the patient's

arms being included in the field-of-view which diminishes detail,

and interpretation was made in light of this technical confine.

Absence of intravenous contrast decreases sensitivity for

detection of lymphadenopathy, focal lesions and vascular

pathology.



Moderate dependent atelectasis is demonstrated in the dependent

portions of both lower lobes. Calcified pulmonary nodules

demonstrated in the left lower lobe. The heart is top normal in

size. Coronary artery calcifications are demonstrated. There is

trace pericardial fluid, without overt effusion.



A calcified granuloma is demonstrated in the posterior spleen.

The spleen is otherwise unremarkable. The liver demonstrates no

focal abnormality. The gallbladder is not well distended. There

is suggestion of small stones layering near the neck of the

gallbladder. No biliary ductal dilatation is appreciated. There

is atrophy of the pancreas. No pancreatic ductal dilatation is

demonstrated.



There is a 2.4 x 2.0 cm right adrenal nodule. This demonstrates

an internal attenuation of approximately 32 Hounsfield units. The

left adrenal gland is normal. The kidneys are symmetric in size,

without evidence of renal calculus or hydronephrosis. Incidental

note is made of a 3.6 and meter low-attenuation lesion in the

upper pole of the left kidney, likely reflecting a simple cyst.

There is nonspecific bilateral perinephric stranding, likely on

the basis of prior infection/scarring. No focal perinephric

collection is demonstrated. The visualized ureters are normal.



The stomach is moderately distended with fluid and partially

digested food products. There is evidence of bowel obstruction or

focal bowel wall thickening. There is no pneumoperitoneum,

abdominal free fluid, or loculated collection.



There is severe calcified atherosclerotic plaque involving the

abdominal aorta, without aneurysmal dilatation. Bilateral femoral

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 No lymphadenopathy is

appreciated. The bladder is unremarkable. The prostate gland is

not enlarged.



Dystrophic calcifications are demonstrated in the ventral

abdominal wall inferiorly. There is mild generalized edema in the

subcutaneous fat of the flanks bilaterally.



There is severe multilevel degenerative change of the spine, most

prominent at the L4-L5 level. There is a comminuted mildly

displaced fracture involving the proximal right femur, with

medial displacement of the greater trochanter, and mild lateral

displacement of the lesser trochanter. There is no evidence of

hip dislocation. The pelvis appears intact. There is partial

fusion of the sacroiliac joints, likely on a degenerative basis.



IMPRESSION:

Comminuted mildly displaced fracture of the proximal right femur.



No acute abdominal or pelvic pathology related to patient's

history of trauma. No evidence of solid organ injury or abdominal

free fluid.



Right adrenal nodule measuring up to 2.4 cm. This is

indeterminate based on today's imaging and is of indeterminate

etiology or clinical significance. Adrenal protocol CT is

recommended for further evaluation on a nonemergent basis, per

[employer] Incidental Findings Committee white

paper management recommendations for adrenal masses. Also,

consideration for routine biochemical evaluation recommended

(JACR 2017;14:1170-9285).



Report was called to SHEYLA Caruso in the [place] [facility] at

7:08 p.m., by dilcia (for AG). 



Dictated by: 



  Dictated on workstation # [id number]

## 2019-07-21 NOTE — ED FALL/INJURY
General


Stated Complaint:  FALL


Source:  patient, EMS


Exam Limitations:  no limitations


 (MILADIS WEBER)





History of Present Illness


Date Seen by Provider:  2019


Time Seen by Provider:  16:47


Initial Comments


To ER per EMS from home with reports of a fall. He is bedbound, has to use a 

Ash lift to get out of his chair/bed. He has right-sided deficit from previous

CVA. Today the Ash lift flipped over while he was in it, landed on his right 

side. Complains of pain all over but mostly on the right side. He is from home.


Occurred:  just prior to arrival


Severity:  moderate


Injuries/Pain Location:  head, upper extremity, abdomen, pelvis, lower extremity


Associated Symptoms (Fall):  Abdominal Pain, Headache; No Neck Pain (MILADIS WEBER)


Loss of Consciousness:  no loss of consciousness


Associated Symptoms (Fall):  No Chest Pain, No Confusion; Muscle Spasms; No 

Nausea/Vomiting (JUDE CHOI MD)





Allergies and Home Medications


Allergies


Coded Allergies:  


     Penicillins (Verified  Allergy, Unknown, 18)





Home Medications


Atorvastatin Calcium 10 Mg Tablet, 10 MG PO HS, (Reported)


Dabigatran Etexilate Mesylate 150 Mg Capsule, 150 MG PO BID, (Reported)


Doxycycline Hyclate 100 Mg Capsule, 100 MG PO BID, (Reported)


Famotidine 20 Mg Tablet, 20 MG PO BID, (Reported)


Furosemide 80 Mg Tablet, 80 MG PO DAILY, (Reported)


Hydroxyurea 500 Mg Capsule, 500 MG PO BID, (Reported)


Insulin Degludec 100 Unit/1 Ml Insuln.pen, 45 UNIT SQ HS, (Reported)


Insulin Lispro 100 Unit/1 Ml Cartridge, 100 UNIT SQ PRN, (Reported)


Lisinopril 40 Mg Tablet, 40 MG PO DAILY, (Reported)


Metoprolol Tartrate 25 Mg Tablet, 12.5 MG PO BID, (Reported)


Oxycodone HCl/Acetaminophen 1 Each Tablet, 1-2 EACH PO Q4H PRN for PAIN-MODERATE


   Prescribed by: ANGELA BRENAL on 18 1309


Potassium Chloride 20 Meq Tab.er.prt, 20 MEQ PO DAILY, (Reported)


Tamsulosin HCl 0.4 Mg Cap.er.24h, 0.4 MG PO DAILY, (Reported)


Trazodone HCl 50 Mg Tablet, 50 MG PO DAILY, (Reported)





Patient Home Medication List


Home Medication List Reviewed:  Yes


 (JUDE CHOI MD)





Review of Systems


Review of Systems


Constitutional:  see HPI


Eyes:  No Symptoms Reported


Ears, Nose, Mouth, Throat:  no symptoms reported


Respiratory:  no symptoms reported


Cardiovascular:  no symptoms reported


Genitourinary:  no symptoms reported


Musculoskeletal:  no symptoms reported


Skin:  no symptoms reported


Psychiatric/Neurological:  No Symptoms Reported (MILADIS WEBER)





All Other Systems Reviewed


Negative Unless Noted:  Yes


 (JUDE CHOI MD)





Past Medical-Social-Family Hx


Past Med/Social Hx:  Reviewed Nursing Past Med/Soc Hx


 (JUDE CHOI MD)


Patient Social History


Former Smoker, Quit:  Dec 6, 2014


Recent Hopitalizations:  No


 (MILADIS WEBER)





Immunizations Up To Date


Date of Influenza Vaccine:  Oct 8, 2018


 (MILADIS WEBER)





Seasonal Allergies


Seasonal Allergies:  No


 (MILADIS WEBER)





Past Medical History


Deep Vein Thrombosis, Hypertension, Peripheral Vascular


Stroke


Reproductive Disorders:  No


Sexually Transmitted Disease:  No


HIV/AIDS:  No


Diabetes, Non-Insulin dep


Loss of Vision:  Bilateral


Hearing Impairment:  Denies


Adverse Reaction/Blood Tranf:  No


 (MILADIS WEBER)





Family Medical History


Reviewed Nursing Family Hx


 (JUDE CHOI MD)





Physical Exam


Vital Signs





Vital Signs - First Documented








 19





 17:02


 


Temp 99.5


 


Pulse 100


 


Resp 20


 


B/P (MAP) 167/107 (127)


 


Pulse Ox 95


 


O2 Delivery Nasal Cannula


 


O2 Flow Rate 3.00





 (JUDE CHOI MD)


Vital Signs


Capillary Refill :  


 (MILADIS WEBER)


Height, Weight, BMI


Height: 5'10.00"


Weight: 213lbs. 0.0oz. 96.511357mq; 30.6 BMI


Method:Stated


General Appearance:  WD/WN, no apparent distress


HEENT:  PERRL/EOMI, normal ENT inspection


Respiratory:  no respiratory distress, no accessory muscle use


Gastrointestinal:  normal bowel sounds (O), soft


Extremities:  normal range of motion, non-tender


Neurologic/Psychiatric:  alert, normal mood/affect, oriented x 3


Skin:  normal color, warm/dry (MILADIS WEBER)


Neck:  full range of motion, supple


Cardiovascular:  no murmur, tachycardia


Back:  normal inspection, no CVA tenderness, no vertebral tenderness 

(JUDE CHOI MD)


Janel Coma Score


Best Eye Response:  (4) Open Spontaneously


Best Verbal Response:  (5) Oriented


Best Motor Response:  (6) Obeys Commands


[place] Total:  15


 (MILADIS WEBER)





Progress/Results/Core Measures


Results/Orders


Lab Results





Laboratory Tests








Test


 19


17:05 19


19:31 Range/Units


 


 


White Blood Count


 20.7 H


 


 4.3-11.0


10^3/uL


 


Red Blood Count


 3.13 L


 


 4.35-5.85


10^6/uL


 


Hemoglobin 10.2 L  13.3-17.7  G/DL


 


Hematocrit 32 L  40-54  %


 


Mean Corpuscular Volume 104 H  80-99  FL


 


Mean Corpuscular Hemoglobin 33   25-34  PG


 


Mean Corpuscular Hemoglobin


Concent 32 


 


 32-36  G/DL





 


Red Cell Distribution Width 23.2 H  10.0-14.5  %


 


Platelet Count


 692 H


 


 130-400


10^3/uL


 


Mean Platelet Volume 10.7 H  7.4-10.4  FL


 


Neutrophils (%) (Auto) 91 H  42-75  %


 


Lymphocytes (%) (Auto) 3 L  12-44  %


 


Monocytes (%) (Auto) 5   0-12  %


 


Eosinophils (%) (Auto) 1   0-10  %


 


Basophils (%) (Auto) 0   0-10  %


 


Neutrophils # (Auto) 18.9 H  1.8-7.8  X 10^3


 


Lymphocytes # (Auto) 0.6 L  1.0-4.0  X 10^3


 


Monocytes # (Auto) 1.0   0.0-1.0  X 10^3


 


Eosinophils # (Auto)


 0.2 


 


 0.0-0.3


10^3/uL


 


Basophils # (Auto)


 0.1 


 


 0.0-0.1


10^3/uL


 


Neutrophils % (Manual) 85    %


 


Lymphocytes % (Manual) 4    %


 


Monocytes % (Manual) 7    %


 


Eosinophils % (Manual) 0    %


 


Basophils % (Manual) 0    %


 


Band Neutrophils 4    %


 


Polychromasia SLIGHT    


 


Anisocytosis MARKED    


 


Sodium Level 133 L  135-145  MMOL/L


 


Potassium Level 4.9   3.6-5.0  MMOL/L


 


Chloride Level 95 L    MMOL/L


 


Carbon Dioxide Level 24   21-32  MMOL/L


 


Anion Gap 14   5-14  MMOL/L


 


Blood Urea Nitrogen 17   7-18  MG/DL


 


Creatinine


 0.90 


 


 0.60-1.30


MG/DL


 


Estimat Glomerular Filtration


Rate > 60 


 


  





 


BUN/Creatinine Ratio 19    


 


Glucose Level 253 H    MG/DL


 


Calcium Level 9.0   8.5-10.1  MG/DL


 


Corrected Calcium 9.2   8.5-10.1  MG/DL


 


Total Bilirubin 0.5   0.1-1.0  MG/DL


 


Aspartate Amino Transf


(AST/SGOT) 24 


 


 5-34  U/L





 


Alanine Aminotransferase


(ALT/SGPT) 26 


 


 0-55  U/L





 


Alkaline Phosphatase 109     U/L


 


Total Protein 7.0   6.4-8.2  GM/DL


 


Albumin 3.7   3.2-4.5  GM/DL


 


Urine Color  YELLOW   


 


Urine Clarity  CLEAR   


 


Urine pH  5  5-9  


 


Urine Specific Gravity  1.020  1.016-1.022  


 


Urine Protein  2+ H NEGATIVE  


 


Urine Glucose (UA)  2+ H NEGATIVE  


 


Urine Ketones  1+ H NEGATIVE  


 


Urine Nitrite  NEGATIVE  NEGATIVE  


 


Urine Bilirubin  NEGATIVE  NEGATIVE  


 


Urine Urobilinogen  NORMAL  NORMAL  MG/DL


 


Urine Leukocyte Esterase  2+ H NEGATIVE  


 


Urine RBC (Auto)  NEGATIVE  NEGATIVE  


 


Urine RBC  RARE   /HPF


 


Urine WBC  2-5   /HPF


 


Urine Squamous Epithelial


Cells 


 0-2 


  /HPF





 


Urine Crystals  NONE   /LPF


 


Urine Bacteria  TRACE   /HPF


 


Urine Casts  PRESENT   /LPF


 


Urine Hyaline Casts  25-50 H  /LPF


 


Urine Mucus  NEGATIVE   /LPF


 


Urine Culture Indicated  NO   





 (JUDE CHOI MD)


My Orders





Orders - JDUE CHOI MD


Ketorolac Injection (Toradol Injection) (19 16:52)


Lorazepam Injection (Ativan Injection) (19 17:00)


 (JUDE CHOI MD)


Medications Given in ED





Current Medications








 Medications  Dose


 Ordered  Sig/Yanna


 Route  Start Time


 Stop Time Status Last Admin


Dose Admin


 


 Fentanyl Citrate  50 mcg  ONCE  ONCE


 IVP  19 16:45


 19 16:47 DC 19 17:15


50 MCG


 


 Lorazepam  1 mg  ONCE  ONCE


 IVP  19 17:00


 19 17:01 DC 19 17:30


1 MG





 (JUDE CHOI MD)


Vital Signs/I&O











 19





 17:02 19:58


 


Temp 99.5 99.4


 


Pulse 100 112


 


Resp 20 16


 


B/P (MAP) 167/107 (127) 147/84 (105)


 


Pulse Ox 95 95


 


O2 Delivery Nasal Cannula Nasal Cannula


 


O2 Flow Rate 3.00 3.00





 (JUDE CHOI MD)





Progress


Progress Note :  


Progress Note


Seen and evaluated the patient. I assumed care from Miladis CARRION pending 

radiological studies and labs. Patient is here with significant right hip pain 

but also has pain essentially everywhere on the right side. EMS did give 150 g 

of fentanyl in route and we gave another 50 g of fentanyl as well as 1 mg 

Ativan IV which did help his pain and spasms. We will get CT of the head and 

neck as well as the abdomen and pelvis. Multiple x-rays ordered including right 

arm, chest, hip and right pelvis, right knee and right tib-fib. Monitor patient.

: Patient has right intertrochanteric hip fracture. I discussed the case 

with Dr. Schwab because the patient is so complicated due to his history of 

axillary femoral bypass bilaterally. Also has polycythemia vera. Dr. Schwab 

believes that he has the technical capability but would need significant medical

clearance. : I discussed the case with Dr. Roberto. She did agree that this 

patient is very complicated and will require significant medical clearance. We 

will consult cardiology, hematology and pulmonology. I discussed the case with 

Dr. Ellis at  and he will follow and give recommendations related to 

patient's blood counts and treatment. : I discussed the case with Dr. Beltrán

and he will follow and provide recommendations as well. We will get EKG in the 

morning as well as echocardiogram. : I discussed the case with Dr. Sterling 

and he will see the patient in consult as well. All of the findings, concerns 

and issues were discussed with the patient and family. Family understands that 

patient will require significant medical clearance prior to surgery and also had

he may not be cleared for surgery, at least at this institution. They are 

appreciative of all of the help and work and await recommendations. Admit, 

inpatient status. Patient and family agree with plan. Pain is currently 

controlled.


 (JUDE CHOI MD)





Diagnostic Imaging





   Diagonstic Imaging:  CT


   Plain Films/CT/US/NM/MRI:  c-spine, head


Comments


                 [facility].


                                [place], [place]





NAME:   BRYAN MILLS


[facility] REC#:   [id number]


ACCOUNT#:   [id number]


PT STATUS:   REG ER


:   1950


PHYSICIAN:   MILADIS WEBER


ADMIT DATE:   19/ER


                                   ***Draft***


Date of Exam:19





CT HEAD/CERVICAL SPINE WO








PROCEDURE: CT head and CT cervical spine without contrast.





TECHNIQUE: Multiple contiguous axial images were obtained through


the brain and cervical spine without the use of intravenous


contrast. Sagittal and coronal reformations through the cervical


spine were then performed. Auto Exposure Controls were utilized


during the CT exam to meet ALARA standards for radiation dose


reduction. 





INDICATION: Traumatic head/neck injury sustained during fall.





COMPARISON: None available.





FINDINGS - CT BRAIN: 


BRAIN: There is marked encephalomalacia in the left


frontotemporal region, with associated ex vacuo dilatation of the


left lateral ventricle. There is no parenchymal hemorrhage,


midline shift or mass effect. There is no evidence of acute


territorial infarct. No significant white matter lesions.


Prominence of the ventricles and sulci is consistent with


cortical and cerebellar parenchymal volume loss, commensurate


with age. 





EXTRA-AXIAL SPACES: No subdural or epidural collections.





ORBITS AND PARANASAL SINUSES: Visualized orbits and globes are


intact. There is mucosal thickening or a mucus retention


cyst/polyp that is incompletely imaged in the left maxillary


sinus. There is partial opacification of ethmoid air cells on the


right, posteriorly. Visualized paranasal sinuses and mastoid air


cells are otherwise clear.





CALVARIUM AND SOFT TISSUES: The calvarium is intact. No fractures


or suspicious bony lesions. The extracranial soft tissues are


unremarkable.





FINDINGS - CT CERVICAL SPINE:


SPINE: No fracture or acute osseous abnormality is appreciated.


The patient is status post anterior cervical fusion at the C5-6


level and laminectomy/foraminotomy at the C3-C7 levels. There is


bony fusion across the C5-6 level.





There is straightening of cervical lordosis. No subluxation.


There is severe multilevel degenerative loss of disc height with


endplate osteophytes. No locked or perched facet.





SOFT TISSUES AND LUNG APICES: Soft tissues unremarkable. Minimal


visualization of the lung apices demonstrates no focal


abnormality.








IMPRESSION: - CT BRAIN: 


No acute intracranial pathology. There are chronic changes


consisting of age-related volume loss and encephalomalacia from


prior left MCA territory infarct.





IMPRESSION: - CT CERVICAL SPINE: 


Multilevel postoperative and degenerative changes of the cervical


spine, there is no acute fracture or subluxation.





  Dictated on workstation # [id number]








Dict:   19 1810


Trans:   19 1824


[facility] [id number]





Interpreted by:     SHARA CARRILLO DO


Electronically signed by:








   Diagonstic Imaging:  CT


   Plain Films/CT/US/NM/MRI:  abdomen, pelvis


Comments


                 [facility] [place], [place]





NAME:   BRYAN MILLS


[facility] REC#:   [id number]


ACCOUNT#:   [id number]


PT STATUS:   REG ER


:   1950


PHYSICIAN:   MILADIS WEBER


ADMIT DATE:   19/ER


                                   ***Draft***


Date of Exam:19





CT ABDOMEN/PELVIS WO








PROCEDURE: CT abdomen and pelvis without contrast.





TECHNIQUE: Multiple contiguous axial images were obtained through


the abdomen and pelvis without the use of intravenous contrast.


Auto Exposure Controls were utilized during the CT exam to meet


ALARA standards for radiation dose reduction. 





INDICATION: Trauma sustained during fall.





COMPARISON: None available.





FINDINGS:


Multiple images are degraded by patient motion and the patient's


arms being included in the field-of-view which diminishes detail,


and interpretation was made in light of this technical confine.


Absence of intravenous contrast decreases sensitivity for


detection of lymphadenopathy, focal lesions and vascular


pathology.





Moderate dependent atelectasis is demonstrated in the dependent


portions of both lower lobes. Calcified pulmonary nodules


demonstrated in the left lower lobe. The heart is top normal in


size. Coronary artery calcifications are demonstrated. There is


trace pericardial fluid, without overt effusion.





A calcified granuloma is demonstrated in the posterior spleen.


The spleen is otherwise unremarkable. The liver demonstrates no


focal abnormality. The gallbladder is not well distended. There


is suggestion of small stones layering near the neck of the


gallbladder. No biliary ductal dilatation is appreciated. There


is atrophy of the pancreas. No pancreatic ductal dilatation is


demonstrated.





There is a 2.4 x 2.0 cm right adrenal nodule. This demonstrates


an internal attenuation of approximately 32 Hounsfield units. The


left adrenal gland is normal. The kidneys are symmetric in size,


without evidence of renal calculus or hydronephrosis. Incidental


note is made of a 3.6 and meter low-attenuation lesion in the


upper pole of the left kidney, likely reflecting a simple cyst.


There is nonspecific bilateral perinephric stranding, likely on


the basis of prior infection/scarring. No focal perinephric


collection is demonstrated. The visualized ureters are normal.





The stomach is moderately distended with fluid and partially


digested food products. There is evidence of bowel obstruction or


focal bowel wall thickening. There is no pneumoperitoneum,


abdominal free fluid, or loculated collection.





There is severe calcified atherosclerotic plaque involving the


abdominal aorta, without aneurysmal dilatation. Bilateral femoral


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 No lymphadenopathy is


appreciated. The bladder is unremarkable. The prostate gland is


not enlarged.





Dystrophic calcifications are demonstrated in the ventral


abdominal wall inferiorly. There is mild generalized edema in the


subcutaneous fat of the flanks bilaterally.





There is severe multilevel degenerative change of the spine, most


prominent at the L4-L5 level. There is a comminuted mildly


displaced fracture involving the proximal right femur, with


medial displacement of the greater trochanter, and mild lateral


displacement of the lesser trochanter. There is no evidence of


hip dislocation. The pelvis appears intact. There is partial


fusion of the sacroiliac joints, likely on a degenerative basis.





IMPRESSION:


Comminuted mildly displaced fracture of the proximal right femur.





No acute abdominal or pelvic pathology related to patient's


history of trauma. No evidence of solid organ injury or abdominal


free fluid.





Right adrenal nodule measuring up to 2.4 cm. This is


indeterminate based on today's imaging and is of indeterminate


etiology or clinical significance. Adrenal protocol CT is


recommended for further evaluation on a nonemergent basis, per


American College of Rad[employer] Incidental Findings Committee white


paper management recommendations for adrenal masses. Also,


consideration for routine biochemical evaluation recommended


(JACR 2017;14:6039-9105).





Report was called to SHEYLA Caruso in the [place] ER at


7:08 p.m., by becki (for AG). 





  Dictated on workstation # [id number]








Dict:   19


Trans:   19


[facility] [id number]





Interpreted by:     SHARA CARRILLO DO


Electronically signed by:








   [facility] Imaging:  Xray


   Plain Films/CT/US/NM/MRI:  chest


Comments


                 [facility].


                                [place], [place]





NAME:   BRYAN MILLS


[facility] REC#:   [id number]


ACCOUNT#:   [id number]


PT STATUS:   REG ER


:   1950


PHYSICIAN:   MILADIS WEBER


ADMIT DATE:   19/ER


                                   ***Draft***


Date of Exam:19





CHEST 1 VIEW, AP/PA ONLY








EXAMINATION: AP supine portable chest.





INDICATION: Trauma sustained during fall.





COMPARISON: None available.





FINDINGS: There is prominent central vascular congestion, without


overt edema. No focal consolidation is appreciated. There is


bilateral perihilar prominence, more pronounced on the right.


Cardiac size is normal. No pneumothorax or large pleural


effusion. No acute osseous abnormality is appreciated. Surgical


clips are demonstrated in bilateral axillae.





IMPRESSION:


1. Central vascular congestion, without overt edema.


2. Bilateral perihilar prominence of indeterminate etiology or


clinical significance. This could reflect mediastinal


lymphadenopathy or prominent pulmonary vasculature. This could be


further evaluated with a chest CT, as clinically indicated. 


 





  Dictated on workstation # [id number]








Dict:   19


Trans:   19


[facility] [id number]





Interpreted by:     SHARA CARRILLO DO


Electronically signed by:  


 (JUDE CHOI MD)





Departure


Communication (Admissions)


Time/Spoke to Admitting Phy:  19:10


Time/Spoke to Consulting Phy:  18:55


 (JUDE CHOI MD)





Impression





   Primary Impression:  


   Intertrochanteric fracture of right hip


   Qualified Codes:  S72.141A - Displaced intertrochanteric fracture of right 

   femur, initial encounter for closed fracture


   Additional Impression:  


   Polycythemia vera


Disposition:   ADMITTED AS INPATIENT


Condition:  Stable





Admissions


Decision to Admit Reason:  Admit from ER (General)


Decision to Admit/Date:  2019


Time/Decision to Admit Time:  18:55


 (JUDE CHOI MD)





Departure-Patient Inst.


Referrals:  


NO,LOCAL PHYSICIAN (PCP)


Primary Care Physician








VILLA MORRIS (Family)


Primary Care Physician











MILADIS WEBER             2019 16:48


JUDE CHOI MD          2019 19:51

## 2019-07-21 NOTE — DIAGNOSTIC IMAGING REPORT
Examination: Single AP view of the pelvis with AP and frog-leg

views of the right hip.



Comparison: Traumatic right hip pain.



Comparison: None available.



Findings:

There is a comminuted displaced intertrochanteric fracture

involving the right proximal femur. There is no evidence of hip

dislocation. The bony pelvis appears intact. Surgical clips are

demonstrated adjacent to both hip joints. Soft tissues are

otherwise unremarkable.



IMPRESSION:

Comminuted mildly displaced intertrochanteric fracture of the

right proximal femur.



Dictated by: 



  Dictated on workstation # [id number]

## 2019-07-21 NOTE — NUR
BRYAN MILLS admitted to room 419-1, with an admitting diagnosis of Right hip 
fracture on 07/21/19 from ER via stretcher.  Accompanied by daughters Laura and Tari.  
BRYAN MILLS introduced to surroundings, call light, bed controls, phone, TV, 
temperature control, lights, meal times, smoking policy, visitor policy, side rail policy, 
bathrooms and showers.  Patient Rights given to patient in the handbook.  Patient has 
history of CVA 2 years ago and is right hemiparesis.  Call placed to left hand and able to 
use left hand arm freely with no deficits.  Family deny any swallowing impairments.   
BRYAN MILLS verbalizes understanding that [facility] is not responsible for the 
loss or damage to any personal effects or valuables that are kept in the patients pocession 
during their hospitalization.  Admit Patient Care Plans were discussed with the daughters.  
Patient has [facility] weekly visits and family will notify them in am.  BRYAN MILLS 
verbalizes understanding of Interdisciplinary Patient Education. Patient and/or family were 
informed about the Rapid Response Team and its purpose.

## 2019-07-22 NOTE — NUR
SPOKE WITH PATIENT AS WELL AS GOING THRU THE EXT MED HISTORY TO COMPLETE THE MED REC.



TREBIBA- 60 UNITS HS

NOVOLOG- USES PER SLIDING SCALE

TRAMADOL 50MG- 1-2 TABS Q 6-8 H PRN

JAKAFI 10MG - 1 BID



OTC MEDICATIONS:

ASPIRIN 81 MG- 1 AM

## 2019-07-22 NOTE — CONSULTATION - ORTHO
Consult - Ortho


Subjective


Date of Exam


7/22/19


Chief Complaint


Right hip pain


HPI/Events since last exam


The patient is a 68-year-old white male who injured his right hip yesterday 

afternoon.  His Ash lift tipped over.  He landed on his right side.  He was 

seen in the emergency room here at [facility] evaluated and x-rayed a 

three-part intertrochanteric fracture of his right hip.  He is a nonambulator.  

He had a stroke 3 years ago affecting his right side.  He states he has no use 

of his right side and does have a little altered sensation involving his right 

upper and lower extremity.  He also has a history of diabetes.  He's has a 

significant history of peripheral vascular disease.  He is basically bed rest.  

He puts no weight on the right lower extremity.  He does live at home.


Medical, Surgical History


Reviewed and significant medical history includes diabetes with amputations of 3

lateral toes on the right foot and the second toe on the left.  He has a 

significant history of peripheral vascular disease with axillary femoral bypass 

surgery bilateral.  Again he has a history of a stroke 3 years ago leaving his 

right side affected with no significant use of the right upper and lower 

extremities.


Social History


Patient lives at home.


Family History


Family history is reviewed and no additions or changes


Review of Systems


Review of systems no additions or changes


Allergies:  


Coded Allergies:  


     Penicillins (Verified  Allergy, Unknown, 12/6/18)


Home Meds


Reported Medications


Tramadol HCl (Tramadol HCl) 50 Mg Tablet,  MG PO Q6H PRN for PAIN-MODERATE


   7/22/19


Aspirin (Aspir 81) 81 Mg Tablet.dr, 81 MG PO DAILY, TAB


   7/22/19


Insulin Aspart (Novolog Flexpen) 300 Units/3 Ml Solution, INJ SLIDING SCALE


   7/22/19


Escitalopram Oxalate (Escitalopram Oxalate) 10 Mg Tablet, 10 MG PO DAILY, TAB


   7/22/19


Ruxolitinib Phosphate (Jakafi) 10 Mg Tablet, 10 MG PO BID


   7/22/19


Tamsulosin HCl (Flomax) 0.4 Mg Cap, 0.4 MG PO DAILY, CAP


   7/22/19


Famotidine (Famotidine) 20 Mg Tablet, 20 MG PO BID, TAB


   7/22/19


Insulin Degludec (Tresiba Flextouch U-100) 100 Unit/1 Ml Insuln.pen, 60 UNIT SQ 

HS, EA


   12/6/18


Trazodone HCl (Trazodone HCl) 50 Mg Tablet, 50 MG PO HS, TAB


   12/6/18


Atorvastatin Calcium (Atorvastatin Calcium) 10 Mg Tablet, 10 MG PO DAILY, TAB


   12/6/18


Dabigatran Etexilate Mesylate (Pradaxa) 150 Mg Capsule, 150 MG PO BID, CAP


   12/6/18


Potassium Chloride (Potassium Chloride) 20 Meq Tab.er.prt, 20 MEQ PO 1700


   12/6/18


Metoprolol Tartrate (Metoprolol Tartrate) 25 Mg Tablet, 12.5 MG PO BID, TAB


   12/6/18


Lisinopril (Lisinopril) 40 Mg Tablet, 40 MG PO 1700, TAB


   12/6/18


Furosemide (Furosemide) 80 Mg Tablet, 80 MG PO 1700, TAB


   12/6/18


Discontinued Reported Medications


Doxycycline Hyclate (Doxycycline Hyclate) 100 Mg Capsule, 100 MG PO BID, CAP


   12/6/18


Insulin Lispro (Humalog) 100 Unit/1 Ml Cartridge, 100 UNIT SQ PRN, EA


   12/6/18


Hydroxyurea (Hydroxyurea) 500 Mg Capsule, 500 MG PO BID, CAP


   12/6/18


Discontinued Scripts


Oxycodone HCl/Acetaminophen (Percocet 5-325 mg Tablet) 1 Each Tablet, 1-2 EACH 

PO Q4H PRN for PAIN-MODERATE MDD 6, #30 TAB 0 Refills


   Prov:ROYAL LICONA ANJANA DPM         12/7/18





Objective


Exam


Constitutional: []


HEENT: []


Neck: [He has a surgical incision posteriorly.  Does have pain with palpation 

and motion.]


Cardiovascular: []


Respiratory: []


Gastrointestinal: []


Genitourinary: []


Skin: []


Back/Spine: No back pain with palpation []


Extremities: [] Pain with motion both shoulders.  No pain left elbow, wrist or 

hand.  He has normal sensation with good cap refill.  Right upper extremity has 

no active motion of the right upper extremity and smaller sensation.  No 

deformity or crepitation at the elbow, wrist or hand.


Lower extremitiesshe has pain with motion right hip.  I tried to extend his 

right knee and he does appear to have a flexion contracture although it's 

difficult to move the knee due to his hip pain.  He has no motion at the ankle. 

 Does have some ulnar sensation right foot.  He has surgical amputation of the 

lateral 3 toes.  Left lower extremity has good motion in the hip, knee and ankle

 without pain.  Slight altered sensation of the foot and toes.  Amputation of 

the second toes noted.


Neurologic: []


Psychiatric: []


Hematologic/lymphatic/immunologic: []


Vital Signs





Vital Signs








  Date Time  Temp Pulse Resp B/P (MAP) Pulse Ox O2 Delivery O2 Flow Rate FiO2


 


7/22/19 08:00 99.2 116 20 143/76 (98) 95 Room Air  


 


7/22/19 04:00 98.9 109 20 106/69 (81) 95 Room Air  


 


7/22/19 00:45 98.1 116 21 110/72 (85) 95 Room Air  


 


7/21/19 21:10     98 Nasal Cannula 2.00 


 


7/21/19 21:09 98.9 118 22 184/67 96 Nasal Cannula 3.50 


 


7/21/19 20:18 99.4 112 16 147/84 (105) 95 Nasal Cannula 3.00 


 


7/21/19 19:58 99.4 112 16 147/84 (105) 95 Nasal Cannula 3.00 


 


7/21/19 17:02 99.5 100 20 167/107 (127) 95 Nasal Cannula 3.00 














I & O 


 


 7/22/19





 07:00


 


Intake Total 100 ml


 


Output Total 250 ml


 


Balance -150 ml








Lab Results


Laboratory Tests


7/21/19 17:05: 


White Blood Count 20.7H, Red Blood Count 3.13L, Hemoglobin 10.2L, Hematocrit 32L

, Mean Corpuscular Volume 104H, Mean Corpuscular Hemoglobin 33, Mean Corpuscular

 Hemoglobin Concent 32, Red Cell Distribution Width 23.2H, Platelet Count 692H, 

Mean Platelet Volume 10.7H, Neutrophils (%) (Auto) 91H, Lymphocytes (%) (Auto) 

3L, Monocytes (%) (Auto) 5, Eosinophils (%) (Auto) 1, Basophils (%) (Auto) 0, 

Neutrophils # (Auto) 18.9H, Lymphocytes # (Auto) 0.6L, Monocytes # (Auto) 1.0, 

Eosinophils # (Auto) 0.2, Basophils # (Auto) 0.1, Neutrophils % (Manual) 85, 

Lymphocytes % (Manual) 4, Monocytes % (Manual) 7, Eosinophils % (Manual) 0, 

Basophils % (Manual) 0, Band Neutrophils 4, Polychromasia SLIGHT, Anisocytosis 

MARKED, Sodium Level 133L, Potassium Level 4.9, Chloride Level 95L, Carbon 

Dioxide Level 24, Anion Gap 14, Blood Urea Nitrogen 17, Creatinine 0.90, Estimat

 Glomerular Filtration Rate > 60, BUN/Creatinine Ratio 19, Glucose Level 253H, 

Calcium Level 9.0, Corrected Calcium 9.2, Total Bilirubin 0.5, Aspartate Amino 

Transf (AST/SGOT) 24, Alanine Aminotransferase (ALT/SGPT) 26, Alkaline 

Phosphatase 109, Total Protein 7.0, Albumin 3.7


7/21/19 19:31: 


Urine Color YELLOW, Urine Clarity CLEAR, Urine pH 5, Urine Specific Gravity 

1.020, Urine Protein 2+H, Urine Glucose (UA) 2+H, Urine Ketones 1+H, Urine 

Nitrite NEGATIVE, Urine Bilirubin NEGATIVE, Urine Urobilinogen NORMAL, Urine 

Leukocyte Esterase 2+H, Urine RBC (Auto) NEGATIVE, Urine RBC RARE, Urine WBC 2-

5, Urine Squamous Epithelial Cells 0-2, Urine Crystals NONE, Urine Bacteria 

TRACE, Urine Casts PRESENT, Urine Hyaline Casts 25-50H, Urine Mucus NEGATIVE, 

Urine Culture Indicated NO


7/21/19 21:19: Glucometer 384H


7/22/19 04:50: 


Sodium Level 132L, Potassium Level 4.9, Chloride Level 97L, Carbon Dioxide Level

 23, Anion Gap 12, Blood Urea Nitrogen 19H, Creatinine 0.86, Estimat Glomerular 

Filtration Rate > 60, BUN/Creatinine Ratio 22, Glucose Level 238H, Calcium Level

 8.7, Corrected Calcium 9.2, Total Bilirubin 0.4, Aspartate Amino Transf 

(AST/SGOT) 20, Alanine Aminotransferase (ALT/SGPT) 22, Alkaline Phosphatase 103,

 Total Protein 6.4, Albumin 3.4


7/22/19 05:40: 


White Blood Count 13.3H, Red Blood Count 3.03L, Hemoglobin 9.8L, Hematocrit 31L,

 Mean Corpuscular Volume 103H, Mean Corpuscular Hemoglobin 32, Mean Corpuscular 

Hemoglobin Concent 31L, Red Cell Distribution Width 23.2H, Platelet Count 975H, 

Mean Platelet Volume 10.5H, Neutrophils (%) (Auto) 84H, Lymphocytes (%) (Auto) 5

L, Monocytes (%) (Auto) 7, Eosinophils (%) (Auto) 3, Basophils (%) (Auto) 0, 

Neutrophils # (Auto) 11.2H, Lymphocytes # (Auto) 0.7L, Monocytes # (Auto) 1.0, 

Eosinophils # (Auto) 0.4H, Basophils # (Auto) 0.1


7/22/19 06:05: Glucometer 272H





Imaging


X-rays were reviewed of the right hip and pelvis which shows a three-part 

intertrochanteric fracture of the right hip.  X-rays of the right humerus does 

show what appears to be an impacted surgical neck fracture.





Assessment and Plan


Assessment


Intertrochanteric fracture right hip, possible surgical neck fracture right 

humerus


Problem List


Orthopedic problem list is three-part intertrochanteric fracture right hip and 

possible fracture right proximal humerus


Plan


Planx-rays both shoulders.  I talked at length about his hip fracture.  Treated

 nonsurgically would have to be bed rest with no use of the right leg.  Since he

 is a nonambulator and has no use of the right leg this could be an option.  

There is increased risk of pressure sores from this type of treatment.  It may 

take 6-8 weeks for the fracture to heal.  I discussed surgical options which 

would be a long TFN of the right hip.  He is at increased risk due to his 

medical comorbidities for surgical treatment and general anesthesia.  I 

discussed the procedure, risks and complications.  There did discuss this and 

decide what they want to do.  His also been seen for evaluation of his medical 

issues and will proceed with surgery if he is cleared if the patient and his 

family want to proceed with surgery.  He also has significant osteoporosis 

involving his right lower extremity and there is increased risk of the hardware 

cutting out.  The fact that he has no use of the right lower extremity and has 

no ability to bear weight would decrease this risk though.





Final Diagonsis


Three-part intertrochanteric fracture right hip.


Level of the visit:  Level 3











SCHWAB,TERRY D MD              Jul 22, 2019 11:32

## 2019-07-22 NOTE — DIAGNOSTIC IMAGING REPORT
EXAMINATION: Bilateral shoulders at 12:20 p.m.



INDICATION: Fell yesterday.



TECHNIQUE: Two views of both shoulder joints were obtained.



COMPARISON: There are no prior studies available for comparison.



FINDINGS: There is a slightly impacted essentially nondisplaced

fracture of the surgical neck of the right humerus. There does

seem to be callus formation about this fracture indicating that

it may be subacute or chronic in nature. If further imaging is

desired, then CT would be recommended. The images of the left

shoulder do show buckling of the lateral cortex of the greater

tuberosity. This finding is of uncertain etiology, although

unlikely to be related to an acute injury.



There is moderate degenerative disease of both glenohumeral

joints and moderately severe degenerative disease of the

acromioclavicular joints. Surgical clips are seen in the soft

tissues about both shoulder joints. The soft tissues are

otherwise unremarkable.



IMPRESSION:

1. There is a slightly impacted essentially nondisplaced fracture

of the surgical neck of the right humerus. The age of this injury

is indeterminate and could be subacute or chronic. Clinical

followup is recommended.

2. There is no acute bony abnormality involving the left shoulder

joint.

3. There is moderately severe degenerative disease of both

acromioclavicular joints.



Dictated by: 



  Dictated on workstation # [id number]

## 2019-07-22 NOTE — DIAGNOSTIC IMAGING REPORT
INDICATION: Abdominal distention.



COMPARISON: July 21, 2019.



TECHNIQUE: Two radiographs of the abdomen dated July 22, 2019.



FINDINGS: The visualized lung bases are clear. Gas-filled loops

of large and small bowel are identified. No dilated loops of

small bowel. No free air. Right femoral intertrochanteric

fracture is again seen. Surgical clips overlying the bilateral

inguinal regions. Scattered osseous degenerative changes.



IMPRESSION: No definite evidence of bowel obstruction or free

air.



Right femoral intertrochanteric fracture is again identified.



Additional stable findings as above.



Dictated by: 



  Dictated on workstation # [id number]

## 2019-07-22 NOTE — HISTORY & PHYSICAL-HOSPITALIST
History of Present Illness


HPI/Chief Complaint


The patient is a 68 soon to be 69-year-old white male.  He is very disabled and 

bedfast.  He suffers from polycythemia vera.  Yesterday he was apparently being 

transferred from his bed by a Ash lift.  This tipped over and he fell heavily 

on his right side.  He was brought to the emergency room and found to have a fra

cture of the right hip.  He has a past history of peripheral vascular disease 

and several toes have been amputated.  He and his family are attempting to 

decide whether to proceed with repair of the hip fracture versus forgoing 

operation as he is not ambulatory.


Source:  patient, family


Date Seen


19


Time Seen by a Provider:  12:16


Attending Physician


Basia Roberto MD


PCP


No,Local Physician


Referring Physician





Date of Admission


2019 at 19:50





Home Medications & Allergies


Home Medications


Reviewed patient Home Medication Reconciliation performed by pharmacy medication

reconciliations technician and/or nursing.


Patients Allergies have been reviewed.





Allergies





Allergies


Coded Allergies


  Penicillins (Verified Allergy, Unknown, 18)








Past Medical-Social-Family Hx


Past Med/Social Hx:  Reviewed Nursing Past Med/Soc Hx


Patient Social History


Former Smoker, Quit:  Dec 6, 2014


Recent Foreign Travel:  No


Contact w/other who traveled:  No


Recent Hopitalizations:  No


Recent Infectious Disease Expo:  No





Immunizations Up To Date


Date of Influenza Vaccine:  Oct 8, 2018





Seasonal Allergies


Seasonal Allergies:  No





Past Medical History


Cardiac:  Deep Vein Thrombosis, Hypertension, Peripheral Vascular


Neurological:  Stroke


Reproductive:  No


Sexually Transmitted Disease:  No


HIV/AIDS:  No


Endocrine:  Diabetes, Non-Insulin dep


Loss of Vision:  Bilateral


Hearing Impairment:  Denies


History of Blood Disorders:  Yes (Polycemia Vera)


Adverse Reaction to Blood Finley:  No





Family History


Reviewed Nursing Family Hx





Review of Systems


Constitutional:  see HPI


EENTM:  no symptoms reported


Respiratory:  no symptoms reported


Cardiovascular:  no symptoms reported


Gastrointestinal:  constipation


Genitourinary:  frequency


Musculoskeletal:  see HPI


Skin:  change in color (feet)


Psychiatric/Neurological:  No Symptoms Reported





Physical Exam


Physical Exam


Vital Signs





Vital Signs - First Documented








 19





 17:02


 


Temp 99.5


 


Pulse 100


 


Resp 20


 


B/P (MAP) 167/107 (127)


 


Pulse Ox 95


 


O2 Delivery Nasal Cannula


 


O2 Flow Rate 3.00





Capillary Refill : Less Than 3 SecondsLess Than 3 Seconds


Height, Weight, BMI


Height: 5'9.00"


Weight: 250lbs. 9.0oz. 113.323419ct; 37.0 BMI


Method:Stated


General Appearance:  Mild Distress


Eyes:  Bilateral Eye Normal Inspection


HEENT:  Normal ENT Inspection


Neck:  Normal Inspection


Respiratory:  Chest Non Tender, Lungs Clear, Normal Breath Sounds, No Accessory 

Muscle Use, No Respiratory Distress


Cardiovascular:  Regular Rate, Rhythm, No Edema, No Gallop, No JVD, No Murmur, 

Normal Peripheral Pulses


Gastrointestinal:  Other (the abdomen is tight and tympanitic.)


Extremity:  Other (feet are bronzy in color.  Dorsalis pedis pulses are not 

palpable)


Neurologic/Psychiatric:  Alert, Oriented x3, No Motor/Sensory Deficits, Normal 

Mood/Affect, CNs II-XII Norm as Tested





Results


Results/Procedures


Labs


Laboratory Tests


19 17:05








19 04:50








19 05:40








Patient resulted labs reviewed.





Assessment/Plan


Assessment and Plan


Fracture right hip.  2.peripheral vascular disease.  3.polycythemia vera.  

4.non-ambulatory status.





Clinical Quality Measures


DVT/VTE Risk/Contraindication:


Risk Factor Score Per Nursin


RFS Level Per Nursing on Admit:  4+=Very High











CASTRO ANGELES MD             2019 12:21

## 2019-07-22 NOTE — PULMONARY CONSULTATION
History of Present Illness


History of Present Illness


Date of Consultation


7/22/19


 07:53


Time Seen by Provider:  13:00


Date of Admission





History of Present Illness


69yo with hx of DM, PVD, CVA 3 yrs ago presented to ED s/p fall from Ash lift 

and found to have a right hip fracture. PT has right sided paralysis. He is 

currently on RA at 95%.





Allergies and Home Medications


Allergies


Coded Allergies:  


     Penicillins (Verified  Allergy, Unknown, 12/6/18)





Home Medications


Aspirin 81 Mg Tablet.dr, 81 MG PO DAILY, (Reported)


Atorvastatin Calcium 10 Mg Tablet, 10 MG PO DAILY, (Reported)


Dabigatran Etexilate Mesylate 150 Mg Capsule, 150 MG PO BID, (Reported)


Escitalopram Oxalate 10 Mg Tablet, 10 MG PO DAILY, (Reported)


Famotidine 20 Mg Tablet, 20 MG PO BID, (Reported)


Furosemide 80 Mg Tablet, 80 MG PO 1700, (Reported)


Insulin Aspart 300 Units/3 Ml Solution, INJ SLIDING SCALE, (Reported)


Insulin Degludec 100 Unit/1 Ml Insuln.pen, 60 UNIT SQ HS, (Reported)


Lisinopril 40 Mg Tablet, 40 MG PO 1700, (Reported)


Metoprolol Tartrate 25 Mg Tablet, 12.5 MG PO BID, (Reported)


Potassium Chloride 20 Meq Tab.er.prt, 20 MEQ PO 1700, (Reported)


Ruxolitinib Phosphate 10 Mg Tablet, 10 MG PO BID, (Reported)


Tamsulosin HCl 0.4 Mg Cap, 0.4 MG PO DAILY, (Reported)


Tramadol HCl 50 Mg Tablet,  MG PO Q6H PRN for PAIN-MODERATE, (Reported)


Trazodone HCl 50 Mg Tablet, 50 MG PO HS, (Reported)





Past Medical-Social-Family Hx


Past Med/Social Hx:  Reviewed Nursing Past Med/Soc Hx


Patient Social History


Former Smoker, Quit:  Dec 6, 2014


Recent Foreign Travel:  No


Contact w/Someone Who Travel:  No


Recent Infectious Disease Expo:  No


Recent Hopitalizations:  No


Physical Abuse:  No


Sexual Abuse:  No





Immunizations Up To Date


Date of Influenza Vaccine:  Oct 8, 2018





Seasonal Allergies


Seasonal Allergies:  No





Past Medical History


Surgeries:  Yes (neck, FEM BYPASS, AMPUTATION TOES )


Respiratory:  No


Cardiac:  Yes


Deep Vein Thrombosis, Hypertension, Peripheral Vascular


Neurological:  Yes


Stroke


Reproductive Disorders:  No


Sexually Transmitted Disease:  No


HIV/AIDS:  No


Gastrointestinal:  No


Musculoskeletal:  No


Endocrine:  Yes


Diabetes, Non-Insulin dep


Loss of Vision:  Bilateral


Hearing Impairment:  Denies


Cancer:  No


Psychosocial:  No


Integumentary:  No


Blood Disorders:  Yes (Polycemia Vera)


Adverse Reaction/Blood Tranf:  No





Family Medical History


Reviewed Nursing Family Hx





Sepsis Event


Evaluation


Height, Weight, BMI


Height: 5'9.00"


Weight: 250lbs. 9.0oz. 113.574395yf; 37.0 BMI


Method:Stated





Exam


Exam





Vital Signs








  Date Time  Temp Pulse Resp B/P (MAP) Pulse Ox O2 Delivery O2 Flow Rate FiO2


 


7/22/19 04:00 98.9 109 20 106/69 (81) 95 Room Air  


 


7/22/19 00:45 98.1 116 21 110/72 (85) 95 Room Air  


 


7/21/19 21:10     98 Nasal Cannula 2.00 


 


7/21/19 21:09 98.9 118 22 184/67 96 Nasal Cannula 3.50 


 


7/21/19 20:18 99.4 112 16 147/84 (105) 95 Nasal Cannula 3.00 


 


7/21/19 19:58 99.4 112 16 147/84 (105) 95 Nasal Cannula 3.00 


 


7/21/19 17:02 99.5 100 20 167/107 (127) 95 Nasal Cannula 3.00 








Height & Weight


Height: 5'9.00"


Weight: 250lbs. 9.0oz. 113.460926la; 37.0 BMI


Method:Stated


Capillary Refill:  Less Than 3 Seconds


Gastrointestinal:  normal bowel sounds (O), soft





Results


Lab


Laboratory Tests


7/21/19 17:05








7/22/19 04:50








7/22/19 05:40











Assessment/Plan


Assessment/Plan


Right hip fracture


   -Ortho following 


PVD


Anemia


   -Monitor 


hx of CVA/nonambulatory











LAUREEN JOYCE DO              Jul 22, 2019 07:53

## 2019-07-22 NOTE — CONSULTATION-CARDIOLOGY
HPI-Cardiology


Cardiology Consultation:


Date of Consultation


19


Date of Admission





Attending Physician


Basia Roberto MD


Admitting Physician


No,Local Physician


Consulting Physician


REJI BELTRÁN MD





HPI:


Time Seen by a Provider:  09:00


Chief Complaint:


preoperative cardiovascular risk assessment


this is a 68-year-old gentleman who has history of polycythemia vera, stroke 

with right-sided paralysis.  He is bedbound.  He was being transferred from his 

bed when he fell on his right side and fractured his right hip.  He requires hip

surgery.  He does have history of peripheral vascular disease. the patient has 

not had any previous cardiac evaluation.  He occasionally complains of chest 

discomfort.





Review of Systems-Cardiology


Review of Systems


Constitutional:  As described under HPI; No As described under HPI, No no 

symptoms reported, No chills, No fever, No lightheadedness


Eyes:  No As described under HPI, No no symptoms reported, No blindness, No 

blurred vision, No contact lenses, No drainage, No decreased acuity, No foreign 

body sensation, No pain, No vision change


Ears/Nose/Throat:  No As described under HPI, No no symptoms reported, No 

chronic hearing loss, No ear discharge, No ear pain, No nasal drainage, No 

ulcerations


Respiratory:  No no symptoms reported; As described under HPI; No As described 

under HPI, No cough, No orthopnea, No shortness of breath, No SOB with excertion


Cardiovascular:  No no symptoms reported; As described under HPI; No As descri

bed under HPI, No chest pain, No edema, No irregular heart rate, No 

lightheadedness, No palpitations


Gastrointestinal:  No no symptoms reported, No As described under HPI, No 

abdomen distended, No abdominal pain, No blood streaked bowels, No constipation,

No diarrhea, No nausea, No vomiting, No stool coloration changes


Genitourinary:  No As described under HPI, No burning, No dysuria, No discharge,

No frequency, No flank pain, No hematuria, No urgency


Musculoskeletal:  As describe under HPI, joint pain


Skin:  No rash, No skin related problems, No ulcerations


Psychiatric/Neurological:  No anxiety, No depression, No seizure, No focal 

weakness, No syncope


Hematologic:  No bleeding abnormalities





All Other Systems Reviewed


Negative Unless Noted:  Yes





PMH-Social-Family Hx


Patient Social History


Recent Foreign Travel:  No


Recent Infectious Disease Expo:  No





Immunizations Up To Date


Date of Influenza Vaccine:  Oct 8, 2018





Past Medical History


PMH


As described under Assessment.





Allergies and Home Medications


Allergies


Coded Allergies:  


     Penicillins (Verified  Allergy, Unknown, 18)





Home Medications


Aspirin 81 Mg Tablet.dr, 81 MG PO DAILY, (Reported)


Atorvastatin Calcium 10 Mg Tablet, 10 MG PO DAILY, (Reported)


Dabigatran Etexilate Mesylate 150 Mg Capsule, 150 MG PO BID, (Reported)


Escitalopram Oxalate 10 Mg Tablet, 10 MG PO DAILY, (Reported)


Famotidine 20 Mg Tablet, 20 MG PO BID, (Reported)


Furosemide 80 Mg Tablet, 80 MG PO 1700, (Reported)


Insulin Aspart 300 Units/3 Ml Solution, INJ SLIDING SCALE, (Reported)


Insulin Degludec 100 Unit/1 Ml Insuln.pen, 60 UNIT SQ HS, (Reported)


Lisinopril 40 Mg Tablet, 40 MG PO 1700, (Reported)


Metoprolol Tartrate 25 Mg Tablet, 12.5 MG PO BID, (Reported)


Potassium Chloride 20 Meq Tab.er.prt, 20 MEQ PO 1700, (Reported)


Ruxolitinib Phosphate 10 Mg Tablet, 10 MG PO BID, (Reported)


Tamsulosin HCl 0.4 Mg Cap, 0.4 MG PO DAILY, (Reported)


Tramadol HCl 50 Mg Tablet,  MG PO Q6H PRN for PAIN-MODERATE, (Reported)


Trazodone HCl 50 Mg Tablet, 50 MG PO HS, (Reported)





Patient Home Medication List


Home Medication List Reviewed:  Yes





Physical Exam-Cardiology


Physical Exam


Vital Signs/I&O











 19





 04:00 08:00 12:00 13:08


 


Temp 98.9 99.2 100.4 100.4


 


Pulse 109 116 116 


 


Resp 20 20 16 


 


B/P (MAP) 106/69 (81) 143/76 (98) 145/79 (101) 


 


Pulse Ox 95 95 95 


 


O2 Delivery Room Air Room Air Room Air 





Capillary Refill : Less Than 3 SecondsLess Than 3 Seconds


Constitutional:  appears stated age, AAO x 3; No apparent distress; well-

developed, well-nourished


HEENT:  PERRL; No normal ENT inspection, No TMs normal, No pharynx normal, No 

scleral icterus (R), No scleral icterus (L), No pale conjunctivae (R), No pale 

conjunctivae (L), No photophobia, No TM abnormal (R), No TM abnormal (L), No 

pharyngeal erythema, No tonsillar exudate, No other, No discharge, No EOMI; 

hearing is well preserved; No hard of hearing; oral hygience is good; No 

ulceration, No xanthelasmas are seen


Neck:  No carotid bruit; carotid pulses are 2 + bilaterally


Respiratory:  No accessory muscle use, No respiratory distress, No chest tender,

No chest expansion is symmetric; chest is bilaterally symmetric; No lungs clear 

to percussion; lungs clear to auscultation; No crackles, No rhonchi, No rales, 

No stridor, No wheezing, No pleural rub, No other


Cardiovascular:  regular rate-rhythm; No irregularly irregular, No extra beats, 

No parasternal heave is noted, No JVD, No edema, No bradycardia, No tachycardia,

No point of maximal impulse, No cardiac thrills are palpable; S1 and S2; No 

gallop/S3, No gallop/S4, No diastolic murmur, No systolic murmur, No friction 

rub, No click, No other


Gastrointestinal:  No tender, No soft, No round, No distended, No pulsatile 

mass, No organomegaly, No guarding, No rebound, No tenderness, No hernia, No 

mass, No audible bowel sounds, No abnormal bowel sounds, No abdominal bruits, No

spleenomegaly, No other


Rectal:  deferred


Extremities:  No normal range of motion, No non-tender, No normal inspection, No

pedal edema, No calf tenderness, No normal capillary refill, No pelvis stable, 

No calf tenderness, No inflammation, No pedal edema, No slow capillary refill, 

No swelling, No other, No abrasion, No clubbing, No cyanosis, No ecchymosis, No 

laceration, No no lower extremity edema bilateral, No significant edema; 

tenderness; No wound


Neurologic/Psychiatric:  no motor/sensory deficits, alert, normal mood/affect, 

oriented x 3, power is 5/5 both on sides


Skin:  No normal color, No warm/dry, No cyanosis, No cool, No diaphoresis, No 

damp, No ecchymosis, No jaundice, No mottled, No pallor, No rash, No 

tattoos/piercings, No ulcerations, No rash on exposed areas, No ulcerations on 

exposed areas, No other





Data Review


Labs


Laboratory Tests


19 17:05: 


White Blood Count 20.7H, Red Blood Count 3.13L, Hemoglobin 10.2L, Hematocrit 32L

, Mean Corpuscular Volume 104H, Mean Corpuscular Hemoglobin 33, Mean Corpuscular

Hemoglobin Concent 32, Red Cell Distribution Width 23.2H, Platelet Count 692H, 

Mean Platelet Volume 10.7H, Neutrophils (%) (Auto) 91H, Lymphocytes (%) (Auto) 

3L, Monocytes (%) (Auto) 5, Eosinophils (%) (Auto) 1, Basophils (%) (Auto) 0, 

Neutrophils # (Auto) 18.9H, Lymphocytes # (Auto) 0.6L, Monocytes # (Auto) 1.0, 

Eosinophils # (Auto) 0.2, Basophils # (Auto) 0.1, Neutrophils % (Manual) 85, 

Lymphocytes % (Manual) 4, Monocytes % (Manual) 7, Eosinophils % (Manual) 0, 

Basophils % (Manual) 0, Band Neutrophils 4, Polychromasia SLIGHT, Anisocytosis 

MARKED, Sodium Level 133L, Potassium Level 4.9, Chloride Level 95L, Carbon 

Dioxide Level 24, Anion Gap 14, Blood Urea Nitrogen 17, Creatinine 0.90, Estimat

Glomerular Filtration Rate > 60, BUN/Creatinine Ratio 19, Glucose Level 253H, 

Calcium Level 9.0, Corrected Calcium 9.2, Total Bilirubin 0.5, Aspartate Amino 

Transf (AST/SGOT) 24, Alanine Aminotransferase (ALT/SGPT) 26, Alkaline 

Phosphatase 109, Total Protein 7.0, Albumin 3.7


19 19:31: 


Urine Color YELLOW, Urine Clarity CLEAR, Urine pH 5, Urine Specific Gravity 

1.020, Urine Protein 2+H, Urine Glucose (UA) 2+H, Urine Ketones 1+H, Urine 

Nitrite NEGATIVE, Urine Bilirubin NEGATIVE, Urine Urobilinogen NORMAL, Urine 

Leukocyte Esterase 2+H, Urine RBC (Auto) NEGATIVE, Urine RBC RARE, Urine WBC 2-

5, Urine Squamous Epithelial Cells 0-2, Urine Crystals NONE, Urine Bacteria 

TRACE, Urine Casts PRESENT, Urine Hyaline Casts 25-50H, Urine Mucus NEGATIVE, 

Urine Culture Indicated NO


19 21:19: Glucometer 384H


19 04:50: 


Sodium Level 132L, Potassium Level 4.9, Chloride Level 97L, Carbon Dioxide Level

23, Anion Gap 12, Blood Urea Nitrogen 19H, Creatinine 0.86, Estimat Glomerular 

Filtration Rate > 60, BUN/Creatinine Ratio 22, Glucose Level 238H, Calcium Level

8.7, Corrected Calcium 9.2, Total Bilirubin 0.4, Aspartate Amino Transf (A

ST/SGOT) 20, Alanine Aminotransferase (ALT/SGPT) 22, Alkaline Phosphatase 103, 

Total Protein 6.4, Albumin 3.4


19 05:40: 


White Blood Count 13.3H, Red Blood Count 3.03L, Hemoglobin 9.8L, Hematocrit 31L,

Mean Corpuscular Volume 103H, Mean Corpuscular Hemoglobin 32, Mean Corpuscular 

Hemoglobin Concent 31L, Red Cell Distribution Width 23.2H, Platelet Count 975H, 

Mean Platelet Volume 10.5H, Neutrophils (%) (Auto) 84H, Lymphocytes (%) (Auto) 

5L, Monocytes (%) (Auto) 7, Eosinophils (%) (Auto) 3, Basophils (%) (Auto) 0, 

Neutrophils # (Auto) 11.2H, Lymphocytes # (Auto) 0.7L, Monocytes # (Auto) 1.0, 

Eosinophils # (Auto) 0.4H, Basophils # (Auto) 0.1


19 06:05: Glucometer 272H


19 11:05: Glucometer 271H








ECG Impression


ECG


Initial ECG Rhythm:  S.Tach





A/P-Cardiology


Assessment/Admission Diagnosis


preoperative cardiovascular risk assessment,


Polycythemia vera,


Significant PAD with previous axillary bifemoral bypass.


Sinus tachycardia versus atrial fibrillation, patient is on Pradaxa.


Poor functional capacity.





Plan


this patient requires right hip fracture surgery.  He has poor functional 

capacity and no recent cardiac evaluation.  I'll recommend an echocardiogram and

nuclear stress testing.  He does have occasional chest pain.  He does have 

history of atherosclerotic disease with significant PAD requiring axillary 

bifemoral bypass.  I'm not sure about atrial fibrillation.  EKG shows possible 

sinus tachycardia however the P waves are not very discernible and differential 

diagnoses includes atrial fibrillation.  The patient is on oral anticoagulation.

 I'm not sure if that is because of atrial fibrillation or because of 

polycythemia vera.  The patient already had coffee therefore we're planning to 

do nuclear stress testing in the morning.








Thank you for your consultation. Please call me if you have any questions.








BARBARA Beltrán MD, FACP, FACC, FSCAI, FHRS, CCDS


Interventional Cardiology


Cardiac Electrophysiology


Vascular Medicine and Endovascular Interventions





Clinical Quality Measures


DVT/VTE Risk/Contraindication:


Risk Factor Score Per Nursin


RFS Level Per Nursing on Admit:  4+=Very High











REJI BELTRÁN MD             2019 09:29

## 2019-07-23 NOTE — PROGRESS NOTE - HOSPITALIST
Progress Note


Progress Notes/Assess & Plan


Date Seen


7/23/19


Time Seen by Provider:  11:04


Assessment & Plan


The patient has decided that if only for pain relief surgical repair of the hip 

is his chosen course.  He has still not had a bowel movement.  KUB showed 

scattered stool throughout the colon plus gas but no evidence of obstruction or 

dilatation.





Physical exam: He is alert and conversant.  Lungs are clear to auscultation.  CV

shows a tachycardia which is mostly regular.  Abdomen is still tympanitic.  It 

is noted that the right arm is more edematous than the left.  He has had no IV 

there and his family states that the swelling has been there since his previous 

stroke.





Impression: Fracture right hip.  2.previous CVA.  3.polycythemia vera





Plan: Cathartics.  Await clearance for surgery.











CASTRO ANGELES MD             Jul 23, 2019 11:07

## 2019-07-23 NOTE — PULMONARY PROGRESS NOTE
Subjective


Time Seen by a Provider:  07:00


Subjective/Events-last exam


Pt appears confused.





Sepsis Event


Evaluation


Height, Weight, BMI


Height: 5'9.00"


Weight: 250lbs. 9.0oz. [id number]; 37.0 BMI


Method:Stated





Exam


Exam





Vital Signs








  Date Time  Temp Pulse Resp B/P (MAP) Pulse Ox O2 Delivery O2 Flow Rate FiO2


 


7/23/19 12:00 98.4 131 20 135/78 (97) 94 Nasal Cannula 2.00 


 


7/23/19 08:36  134 18 126/59 (81) 98 Nasal Cannula 2.00 


 


7/23/19 07:22     93 Nasal Cannula 3.00 


 


7/23/19 04:00 98.8 125 20 137/87 (104) 92 Nasal Cannula 2.00 


 


7/23/19 02:58      Nasal Cannula 2.00 


 


7/23/19 00:53     92 Room Air 3.00 


 


7/23/19 00:00 99.5 133 22 146/89 (108) 92 Nasal Cannula 2.00 


 


7/23/19 00:00  125   93   21


 


7/22/19 22:00     92 Room Air 3.00 


 


7/22/19 21:05 97.0       


 


7/22/19 20:35 100.1       


 


7/22/19 19:36 101.0 125 22 147/78 (101) 93 Room Air  


 


7/22/19 16:00 100.4 128 22 154/73 (100) 93 Room Air  














I & O 


 


 7/23/19





 07:00


 


Intake Total 2100 ml


 


Output Total 600 ml


 


Balance 1500 ml








Height & Weight


Height: 5'9.00"


Weight: 250lbs. 9.0oz. [id number]; 37.0 BMI


Method:Stated


General Appearance:  No Apparent Distress


HEENT:  Normal ENT Inspection


Neck:  Normal Inspection


Respiratory:  Chest Non Tender, Lungs Clear, Normal Breath Sounds, No Accessory 

Muscle Use, No Respiratory Distress


Cardiovascular:  No Edema, No Gallop, No JVD, No Murmur, Normal Peripheral 

Pulses, Tachycardia


Capillary Refill:  Less Than 3 Seconds


Gastrointestinal:  normal bowel sounds (O), soft


Extremity:  Other (feet are bronzy in color.  Dorsalis pedis pulses are not 

palpable)


Neurologic/Psychiatric:  Alert, Oriented x3, No Motor/Sensory Deficits, Normal 

Mood/Affect, CNs II-XII Norm as Tested





Results


Lab


Laboratory Tests


7/21/19 17:05








7/22/19 04:50








7/22/19 05:40











Assessment/Plan


Assessment/Plan


Right hip fracture


   -Ortho following


   -Surgery pending 


   -2 liters/min oxygen - 





PVD


Anemia


   -Monitor 


hx of CVA/nonambulatory











LAUREEN JOYCE DO              Jul 23, 2019 14:44

## 2019-07-23 NOTE — CARDIOLOGY STRESS TEST REPORT
Stress Test Report


Type of NM Stress Test:


Test Type:  LEXISCAN 0.4MG/5ML





Date of Procedure/Referring:


Date of Procedure:  Jul 23, 2019


PCP


Basia Roberto MD


Admitting Physician


No,Local Physician





Indications:


Preoperative cardiovascular risk assessment





Baseline Heart Rate:


135





Baseline Blood Pressure:


Blood Pressure Systolic:  135


Blood Pressure Diastolic:  78





Baseline EKG:


Baseline EKG:  atrial fibrillation with RVR





Summary & Conclusion:


Summary:


 


The patient was brought to the stress lab after informed consent was taken.  

Stress test was performed according to the Lexiscan protocol.  0.4 mg of IV 

Lexiscan was given.  Low-grade exercise was performed.  Baseline EKG showed 

atrial fibrillation rhythm at 135 BPM.  Initial blood pressure was 126/59 mmHg. 

Maximum heart rate was 139 bpm and blood pressure 142/51 mmHg.  Patient did not 

have any chest pain, arrhythmias or ST segment changes during the stress test.


 


10.27 mCi of Myoview were given for rest imaging and 32 mCi of Myoview given for

stress imaging.  EF 72 percent.  Normal wall motion.  Patient could not keep his

arms above his head due to previous stroke and disability.  Images therefore 

required are of poor quality and nondiagnostic.


 


Conclusion:


 


Atrial fibrillation with RVR.


Normal LV function with no wall motion abnormalities.


Nondiagnostic study.











REJI RAJPUT MD             Jul 23, 2019 13:12
Home

## 2019-07-23 NOTE — NUR
Initial visit: pt out for procedure. Spoke with daughter Laura. 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 Offered active 
listening and engaged in rapport building.

## 2019-07-23 NOTE — PROGRESS NOTE
Standard Progress Note


Progress Notes/Assess & Plan


Date Seen by a Provider:  Jul 23, 2019


Time Seen by a Provider:  17:24


Progress/Assessment & Plan





68-year-old patient with history of polycythemia and JAK2 mutation with very 

labile platelet counts was on treatment with hydroxyurea in the past.  Recently 

started on treatment with Jakafi 10 mg twice a day.  Patient admitted to the 

hospital with right intertrochanteric fracture with mild displacement following 

a fall.  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 

aphasia.  He completed a cardiology evaluation and a stress test today with 

normal ejection fraction.  He is tentatively scheduled for ORIF tomorrow for 

pain control.  Platelet count today was more than 1.6 million.  He was given 1 g

of hydroxyurea today morning and I will repeat the dose tonight.  Continue 

Jakafi.  He is mildly anemic and okay for PRBC transfusion depending on surgical

blood loss.  Will follow patient with you.











RACHEL LEONARDO                 Jul 23, 2019 17:29

## 2019-07-23 NOTE — PROGRESS NOTE - ORTHO
Progress Note


Subjective


Date of Exam


19


Chief Complaint


Intertrochanteric fracture right hip


HPI/Events since last exam


The patient continues with pain in the right hip due to intertrochanteric 

fracture.  He had is stress test this morning and results are pending.  I spoke 

with Yaneth and told him I have him tentatively scheduled for surgery tomorrow 

morning that 10 o'clock.  Again were waiting for results of his stress test.  

Dr. Chen is fine with him proceeding with surgery as well.


I discussed the x-rays of the right shoulder which shows an impacted surgical 

neck fracture that appears may be 3-4 weeks old as there is some healing callus 

around the fracture.


Review of Systems


Reviewed and no additions or changes


Allergies:  


Coded Allergies:  


     Penicillins (Verified  Allergy, Unknown, 18)


Home Meds


Reported Medications


Tramadol HCl (Tramadol HCl) 50 Mg Tablet,  MG PO Q6H PRN for PAIN-MODERATE


   19


Aspirin (Aspir 81) 81 Mg Tablet.dr, 81 MG PO DAILY, TAB


   19


Insulin Aspart (Novolog Flexpen) 300 Units/3 Ml Solution, INJ SLIDING SCALE


   19


Escitalopram Oxalate (Escitalopram Oxalate) 10 Mg Tablet, 10 MG PO DAILY, TAB


   19


Ruxolitinib Phosphate (Jakafi) 10 Mg Tablet, 10 MG PO BID


   19


Tamsulosin HCl (Flomax) 0.4 Mg Cap, 0.4 MG PO DAILY, CAP


   19


Famotidine (Famotidine) 20 Mg Tablet, 20 MG PO BID, TAB


   19


Insulin Degludec (Tresiba Flextouch U-100) 100 Unit/1 Ml Insuln.pen, 60 UNIT SQ 

HS, EA


   18


Trazodone HCl (Trazodone HCl) 50 Mg Tablet, 50 MG PO HS, TAB


   18


Atorvastatin Calcium (Atorvastatin Calcium) 10 Mg Tablet, 10 MG PO DAILY, TAB


   18


Dabigatran Etexilate Mesylate (Pradaxa) 150 Mg Capsule, 150 MG PO BID, CAP


   18


Potassium Chloride (Potassium Chloride) 20 Meq Tab.er.prt, 20 MEQ PO 1700


   18


Metoprolol Tartrate (Metoprolol Tartrate) 25 Mg Tablet, 12.5 MG PO BID, TAB


   18


Lisinopril (Lisinopril) 40 Mg Tablet, 40 MG PO 1700, TAB


   18


Furosemide (Furosemide) 80 Mg Tablet, 80 MG PO 1700, TAB


   18


Discontinued Reported Medications


Doxycycline Hyclate (Doxycycline Hyclate) 100 Mg Capsule, 100 MG PO BID, CAP


   18


Insulin Lispro (Humalog) 100 Unit/1 Ml Cartridge, 100 UNIT SQ PRN, EA


   18


Hydroxyurea (Hydroxyurea) 500 Mg Capsule, 500 MG PO BID, CAP


   18


Discontinued Scripts


Oxycodone HCl/Acetaminophen (Percocet 5-325 mg Tablet) 1 Each Tablet, 1-2 EACH 

PO Q4H PRN for PAIN-MODERATE MDD 6, #30 TAB 0 Refills


   Prov:LIVANROYAL ANJANA DPM         18





Objective


Exam


Constitutional: []


HEENT: []


Neck: []


Cardiovascular: []


Respiratory: []


Gastrointestinal: []


Genitourinary: []


Skin: []


Back/Spine: []


Extremities: Continues with pain in both shoulders.  Pain right hip with any 

motion of the right lower extremity.  []


Neurologic: []


Psychiatric: []


Hematologic/lymphatic/immunologic: []


Vital Signs





Vital Signs








  Date Time  Temp Pulse Resp B/P (MAP) Pulse Ox O2 Delivery O2 Flow Rate FiO2


 


19 12:00 98.4 131 20 135/78 (97) 94 Nasal Cannula 2.00 


 


19 08:36  134 18 126/59 (81) 98 Nasal Cannula 2.00 


 


19 07:22     93 Nasal Cannula 3.00 


 


19 04:00 98.8 125 20 137/87 (104) 92 Nasal Cannula 2.00 


 


19 02:58      Nasal Cannula 2.00 


 


19 00:53     92 Room Air 3.00 


 


19 00:00 99.5 133 22 146/89 (108) 92 Nasal Cannula 2.00 


 


19 00:00  125   93   21


 


19 22:00     92 Room Air 3.00 


 


19 21:05 97.0       


 


19 20:35 100.1       


 


19 19:36 101.0 125 22 147/78 (101) 93 Room Air  


 


19 16:00 100.4 128 22 154/73 (100) 93 Room Air  














I & O 


 


 19





 07:00


 


Intake Total 2100 ml


 


Output Total 600 ml


 


Balance 1500 ml








Lab Results


Laboratory Tests


19 15:59: Glucometer 226H


19 20:29: Glucometer 223H


19 05:33: Glucometer 230H


19 11:01: Glucometer 295H





Assessment and Plan


Assessment


Continued pain from right hip fracture and right shoulder fracture


Problem List


Unchanged


Plan


PlanI discussed the treatment options again with the patient and his family.  I

 have him tentatively scheduled for surgery tomorrow morning at 10 o'clock.  

Plan on long TFN.  Again were waiting on the results of this stress test.  I 

again discussed the procedure risk complications and they would like to proceed.

  I talked to anesthesia and they will evaluate him prior to surgery.  Check 

hemoglobin tomorrow morning.  I asked him about his allergy to penicillin and it

 causes a skin rash which sounded like it was treated with Benadryl and did 

fine.  No respiratory issues.  He is taking Keflex without any problems.  Plan 

on Ancef as pre-and postop antibiotics.  He's presently on Lovenox and will 

continue that postop.  He understands that he will lose some blood from the 

fracture and also the fact that he is on Lovenox he may require transfusion 

postop.  Again they would like to proceed with surgery.





Final Diagonsis


Healing fracture right proximal humerus


Comminuted three-part intertrochanteric fracture right hip


Level of the visit:  Level 3





Clinical Quality Measures


DVT/VTE Risk/Contraindication:


Risk Factor Score Per Nursin


RFS Level Per Nursing on Admit:  4+=Very High











SCHWAB,TERRY D MD              2019 13:24

## 2019-07-23 NOTE — DIAGNOSTIC IMAGING REPORT
Semierect AP chest at 10:16.



Indication:  Increased wheezing.



The cardiomegaly noted on the prior exam of 07/21/2019 is again

and no different. The central pulmonary vascularity remains

prominent but there is no evidence for overt failure or

pneumonia. There is no significant pleural effusion identified

either. The mediastinum is not widened. The osseous structures

show no sign of an acute abnormality. The fracture of the

surgical neck of right humerus seen on the right shoulder exam

performed yesterday is partially visualized on this study.



Impression:  Stable chest.  When compared to the prior study,

there has been no adverse change. 



Dictated by: 



  Dictated on workstation # [id number]

## 2019-07-23 NOTE — CARDIOLOGY PROGRESS NOTE
Cardiology SOAP Progress Note


Subjective:


Still complains of mild shortness of breath.





Objective:


I&O/Vital Signs











 7/23/19 7/23/19 7/23/19 7/23/19





 02:58 04:00 07:22 08:36


 


Temp  98.8  


 


Pulse  125  134


 


Resp  20  18


 


B/P (MAP)  137/87 (104)  126/59 (81)


 


Pulse Ox  92 93 98


 


O2 Delivery Nasal Cannula Nasal Cannula Nasal Cannula Nasal Cannula


 


O2 Flow Rate 2.00 2.00 3.00 2.00


 


    





 7/23/19   





 12:00   


 


Temp 98.4   


 


Pulse 131   


 


Resp 20   


 


B/P (MAP) 135/78 (97)   


 


Pulse Ox 94   


 


O2 Delivery Nasal Cannula   


 


O2 Flow Rate 2.00   














 7/23/19





 00:00


 


Intake Total 1000 ml


 


Output Total 400 ml


 


Balance 600 ml








Weight (Pounds):  250


Weight (Ounces):  9.0


Weight (Calculated Kilograms):  113.126363


Constitutional:  appears stated age, AAO x 3; No apparent distress; well-

developed, well-nourished


Respiratory:  No accessory muscle use, No respiratory distress, No chest tender,

No chest expansion is symmetric; chest is bilaterally symmetric; No lungs clear 

to percussion; lungs clear to auscultation; No crackles, No rhonchi, No rales, 

No stridor, No wheezing, No pleural rub, No other


Cardiovascular:  regular rate-rhythm; No irregularly irregular, No extra beats, 

No parasternal heave is noted, No JVD, No edema, No bradycardia, No tachycardia,

No point of maximal impulse, No cardiac thrills are palpable; S1 and S2; No 

gallop/S3, No gallop/S4, No diastolic murmur, No systolic murmur, No friction 

rub, No click, No other


Gastrointestional:  No tender, No soft, No round, No distended, No pulsatile 

mass, No organomegaly, No guarding, No rebound, No tenderness, No hernia, No 

mass, No audible bowel sounds, No abnormal bowel sounds, No abdominal bruits, No

spleenomegaly, No other


Extremities:  No normal range of motion, No non-tender, No normal inspection, No

pedal edema, No calf tenderness, No normal capillary refill, No pelvis stable, 

No calf tenderness, No inflammation, No pedal edema, No slow capillary refill, 

No swelling, No other, No abrasion, No clubbing, No cyanosis, No ecchymosis, No 

laceration, No no lower extremity edema bilateral, No significant edema; 

tenderness; No wound


Neurologic/Psychiatric:  no motor/sensory deficits, alert, normal mood/affect, 

oriented x 3, power is 5/5 both on sides


Skin:  No normal color, No warm/dry, No cyanosis, No cool, No diaphoresis, No 

damp, No ecchymosis, No jaundice, No mottled, No pallor, No rash, No 

tattoos/piercings, No ulcerations, No rash on exposed areas, No ulcerations on 

exposed areas, No other





Results/Procedures:


Labs


Laboratory Tests


7/22/19 15:59: Glucometer 226H


7/22/19 20:29: Glucometer 223H


7/23/19 05:33: Glucometer 230H


7/23/19 11:01: Glucometer 295H








A/P:


Assessment/Dx:


preoperative cardiovascular risk assessment,


Polycythemia vera,


Significant PAD with previous axillary bifemoral bypass.


Sinus tachycardia versus atrial fibrillation, patient is on Pradaxa.


Poor functional capacity.


Plan:


this patient requires right hip fracture surgery.  He has poor functional 

capacity and no recent cardiac evaluation. 





Echocardiogram done 7/22/2019 shows normal LV function with PA pressure of 57 

mmHg.  No significant valvular heart disease.





Nuclear stress testing done on 7/23/2019 shows normal LVEF with no wall motion 

abnormalities.  However the patient could not cooperate during imaging therefore

the images are nondiagnostic.





The patient will be considered to be at moderate risk for perioperative major 

adverse cardiac events undergoing intermediate risk noncardiac surgery.  I 

believe the prognosis without hip surgery is poor, therefore recommend that hip 

surgery be performed if okay with the primary team.





Atrial fibrillation with RVR.  Rate control with Cardizem is recommended.  Hold 

oral anticoagulation in anticipation of surgery.





Polycythemia vera,





PAD, axillary bifemoral bypass history.








Thank you for your consultation. Please call me if you have any questions.








BARBARA Beltrán MD, FACP, FACC, FSCAI, FHRS, CCDS


Interventional Cardiology


Cardiac Electrophysiology


Vascular Medicine and Endovascular Interventions











REJI BELTRÁN MD             Jul 23, 2019 13:10

## 2019-07-23 NOTE — CONSULTATION REPORT
DATE OF SERVICE:  07/22/2019



Patient was admitted to room 419.



IMPRESSION:

1.  A 68-year-old male, admitted to the hospital after a fall and right hip

fracture.

2.  History of polycythemia vera with JAK2 mutation diagnosed in 03/2014.  The

patient was on treatment with hydroxyurea, but recently had labile platelet

counts with poor control.

3.  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

hemiplegia and expressive aphasia.

4.  Significant peripheral arterial disease requiring bilateral axillofemoral

and femoral popliteal bypasses in late 2017, early 2018.

5.  History of atrial fibrillation and anticoagulation with Pradaxa as well as

aspirin.



RECOMMENDATIONS:

1.  Start the patient on Jakafi 10 mg twice daily as his blood counts have not

been controlled with hydroxyurea.  The patient already has the medication with

him and may take own medications.

2.  With regards to comminuted and mildly displaced intertrochanteric fracture

of right femur, the patient will need at least pinning of these for pain control

and quality of life.

3.  Informed patient and his daughter today that he is high risk for surgery,

but the quality of life without surgery is going to be very poor.  They would

prefer to proceed with surgery, understanding that it is going to be a high risk

procedure.

4.  Agree with cardiology and pulmonary consultations preoperatively.

5.  Oral anticoagulation has been discontinued at this time.  Consider either

Lovenox or heparin until surgery and resume it postoperatively when stable from

surgical standpoint.  The patient's risk of thrombosis is significant.

6.  Continue to monitor blood counts serially and once his platelet counts

stabilized in the next day or two, it is okay from hematology standpoint to

proceed with proposed orthopedic surgery.



BRIEF HISTORY:

The patient is a 68-year-old male patient with history of polycythemia vera with

JAK2 mutation diagnosed in 03/2014.  He was initially managed with phlebotomy

and hydroxyurea and did well for few years.  In January 2017, he had a

thrombotic CVA with right hemiplegia and expressive aphasia.  His quality of

life had declined since then.  He was also diagnosed with severe peripheral

arterial disease, requiring bilateral axillofemoral and femoral popliteal

bypasses in late 2017 and early 2018.  The patient is wheelchair bound and not

ambulatory.  This past weekend his family tried to lift him using a Ash lift

which tipped over and the patient had a fall.  He was brought to the emergency

room and found to have right hip fracture and he was admitted to the hospital

for further management.  His platelet count done on 07/18/2019 at [facility] on an

outpatient basis had shown a platelet count of 50,000.  This was starting to

improve compared to few days before and the hydroxyurea was on hold.  At the

time of admission yesterday, the platelet counts were 670,000 and today, they

are over 900,000.  Because of the need for surgery, hematology consultation was

requested for concurrent management.



PAST MEDICAL HISTORY:

1.  Significant for diagnosis of polycythemia vera in March 2014 with JAK2

mutation.  Initially, this was controlled with phlebotomies and hydroxyurea as

mentioned above.  Other significant history includes diabetes mellitus type 2

diagnosed in 1999.

2.  Hypertension and hypercholesterolemia, diagnosed around the same time. 

History of thrombotic CVA with right hemiplegia and expressive aphasia in

01/2017.

3.  Peripheral vascular disease requiring bilateral axillofemoral and

femoropopliteal bypass in late 2017 and early 2018.

4.  History of ruptured disk in his neck, requiring surgery in 1999 with bone

grafting.

5.  Diabetic foot ulcer with amputation of toe.



SOCIAL HISTORY:

The patient is  for more than 20 years and lives in [place], [place]. 

He has two daughters, both of whom live in [place].  Previously has worked as a

 since age of 17 years.  More than 50 to 55-pack-year history of

tobacco use, but quit smoking since his CVA.  Previous social alcohol use and no

recreational drug use.



FAMILY HISTORY:

Significant for his father with prostate cancer at 61 years.  Grandfather with

prostate cancer in his upper 60s.  No other malignancies in the family.  No

hematologic problems in the family.



PHYSICAL EXAMINATION:

GENERAL:  Today showed an elderly male, moderately obese, in moderate distress

due to the pain.  The patient is able to communicate with a few words, but

having problems finding words at times.

VITAL SIGNS:  Temperature was 100.4, pulse rate 128, respirations 22, blood

pressure 154/73 with oxygen saturation 93% on room air.

HEENT:  Normocephalic, extraocular muscles intact, oral mucosa slightly dry.

NECK:  Supple, with no JVD.  No cervical, supraclavicular or axillary

lymphadenopathy palpable.

CHEST:  Symmetrical.  Lungs with diminished breath sounds bilaterally without

any wheezes or rales.

CARDIOVASCULAR:  Irregular, tachycardic with no murmurs or gallops heard.

ABDOMEN:  Slightly distended, soft with generalized tenderness without guarding

or rebound.

EXTREMITIES:  Showed no edema.

NEUROLOGIC:  Significant for right hemiplegia.



LABORATORY DATA:

I reviewed his lab work and CBC done last evening from the emergency room showed

white count of 20.7, hemoglobin 10.2 and platelet count of 692,000.  Repeat CBC

done today morning showed white count of 13.3 with hemoglobin 9.8 and platelet

count of 975,000.  Neutrophil count was 11.2, lymphocyte count 0.7 and monocyte

count 1.0.  Chemistry panel done today showed sodium level of 132.  BUN was 19

and creatinine 0.86 with GFR more than 60 mL per minute.  Nonfasting glucose was

238.  Liver function studies were within normal limits.  X-ray of the hips and

pelvis done yesterday at the emergency room showed comminuted, mildly displaced

intertrochanteric fracture of right proximal femur.  Shoulder x-rays done today

morning showed a slightly impacted nondisplaced fracture of the surgical neck of

right humerus with age indeterminate.  This could be subacute or chronic. 

Moderately severe degenerative disease of both acromioclavicular joints.



Thank you for allowing me to participate in this patient's care.  I will follow

the patient with you and make appropriate recommendations.





Job ID: [id number]

DocumentID: [id number]

Dictated Date:  07/22/2019 17:03:35

Transcription Date: 07/23/2019 00:40:01

Dictated By: RACHEL LEONARDO MD

## 2019-07-24 NOTE — PROGRESS NOTE
Standard Progress Note


Progress Notes/Assess & Plan


Date Seen by a Provider:  Jul 24, 2019


Time Seen by a Provider:  15:09


Progress/Assessment & Plan





68-year-old patient with history of polycythemia and JAK2 mutation with very 

labile platelet counts was on treatment with hydroxyurea in the past.  Recently 

started on treatment with Jakafi 10 mg twice a day since 7/22/2019.  Patient 

admitted to the hospital with right intertrochanteric fracture with mild 

displacement following a fall.  Patient had CVA in early 2017 with right 

hemiplegia and expressive aphasia.  He completed ORIF today and is somnolent.  

Family present in room and discussed the plan with them.  Platelet count today 

was over 1.9 million.  Once patient is able to start oral intake, we'll resume 

Jakafi and use Hydrea as needed to control platelet count.  Hemoglobin slightly 

lower this morning.  Monitor CBC daily and transfuse if this drops less than 8 

g/dL.  Will follow patient with you.











RACHEL LEONARDO                 Jul 24, 2019 15:13

## 2019-07-24 NOTE — OPERATIVE REPORT - ORTHO
Operative Report


Surgeon (s)/Assistant (s)


Surgeon


TERRY SCHWAB MD


Assistant


n/a





Pre-Operative Diagnosis


three-part intertrochanteric fracture right hip





Post-Operative Diagnosis


same





Operative Report


Date of Procedure:  Jul 24, 2019


Name of Procedure Performed:  


Open reduction internal fixation of three-part intertrochanteric fracture


right hip with a long TFN


11 mm x 400 mm long nail


110 mm helical blade


Description & Findings


The patient was brought to the operating room in his hospital bed.  He was given

2 g Ancef IV with no problems.  After administration of general anesthesia his 

hospital bed he was transferred to the fracture table.  His right foot and ankle

were placed in a traction boot and the left leg was placed in the well leg 

ortiz.  Perineal post was inserted and traction was applied to the right lower 

extremity.  Image was used to visualize the fracture and with traction and 

countertraction the fracture was reduced with minimal displacement of the lesser

trochanteric fragment.  On the lateral view the fracture was best aligned with 

approximately 15 of external rotation.  At this point the right hip and thigh 

were prepped and draped in usual sterile manner.  Incision was made proximal to 

the greater trochanter.  This was taken down through subtendinous tissue the 

gluteal fascia down to the tip of the greater trochanter.  A guidewire was 

inserted into the tip of the greater trochanter and overreamed with the proximal

reamer.  Initial guidewire was removed and then the beaded long guidewire was 

inserted across fracture identified down into the distal femur.  This was 

measured and a 40 mm x 11 mm long moncho was selected.  A 12 mm reamer was then 

placed over the guidewire with minimal reaming in the midportion of the femur.  

The reamer was then removed and the long moncho was inserted into position for 

placement of the helical blade.  Through a second incision the guide for the 

helical blade was inserted through the iliotibial band and vastus lateralis down

to the lateral cortex of the femur.  A guidewire was then placed through the 

guide into the central aspect of the head and neck on both AP and lateral views.

 This was measured and 110 mm helical blade was selected.  The helical blade 

reamer was then inserted over the guidewire opening up the cortex.  The 110 mm 

helical blade was then inserted over the guidewire to within a few millimeters 

of the articular surface on both AP and lateral views.  Cisz and locked in 

place.  Compression was placed across fracture site improving the position of 

the fracture with compression.  At this point the lateral guide was removed.  X-

rays were obtained of the proximal femur both AP and lateral views in the distal

femur.  Excellent alignment of the fracture, brought in helical blade were 

noted.  It was felt due to the orientation of the fracture that there was no 

need for distal locking screw.  This patient will remain nonweightbearing due to

his previous stroke.  The wounds were irrigated with normal saline.  The fascia 

and iliotibial band were closed with #1 Vicryl.  This obtains tissue with #1 

Vicryl and 2-0 Vicryl proximally and distal wound with 2-0 Vicryl.  Skin was 

closed with staples.  Incision then dressed with antibiotic ointment, Adaptic 

and 4 x 4's and an ABD which were taped in position.  The right foot and ankle 

removed from the traction boot and there was noted be a small skin tear at the 

same site of an old wound.  This was dressed with antibiotic ointment, Adaptic 

and 4 x 4's and wrapped with Coban.  The patient was then transferred to his 

hospital bed, to recovery room in good condition.  He tolerated procedure well.


Estimated blood loss was 200 mL's.  The patient's hemoglobin preop was 8.6 and 

blood was typed and crossed but not transfused.  We'll obtain an H&H proximally 

2 hours postop and transfuse as needed


n/a


Anesthesia Type


Gen.


Estimated Blood Loss


200 mL's


Packing


none.


Specimen(s) collected/removed


None











SCHWAB,TERRY D MD              Jul 24, 2019 13:37

## 2019-07-24 NOTE — NUR
NOTE THAT PT IS VERY SLEEPY -- FSBS  -- PT VOICED HE WAS NOT HUNGRY -- THIS RN HELD 
THE 6 UNITS OF NOVOLOG PER SSI

## 2019-07-24 NOTE — NUR
back to floor from recovery   --- INCISION SITE TO R HIP D/I -- ICE PACK TO SITE  -- REPORT 
FROM [facility] --

## 2019-07-24 NOTE — ANESTHESIA-GENERAL POST-OP
General


Patient Condition


Mental Status/LOC:  Same as Preop


Cardiovascular:  Satisfactory


Nausea/Vomiting:  Absent


Respiratory:  Satisfactory


Pain:  Controlled


Complications:  Absent





Post Op Complications


Complications


None





Follow Up Care/Instructions


Patient Instructions


None needed.





Anesthesia/Patient Condition


Patient Condition


Patient is doing well, no complaints, stable vital signs, no apparent adverse 

anesthesia problems.   


No complications reported per nursing.











NIVIA HAIRSTON CRNA         Jul 24, 2019 18:58

## 2019-07-24 NOTE — CARDIOLOGY PROGRESS NOTE
Cardiology SOAP Progress Note


Subjective:


No cardiac complaints.





Objective:


I&O/Vital Signs











 7/24/19 7/24/19 7/24/19 7/24/19





 02:56 04:00 08:00 08:00


 


Temp  99.2  97.1


 


Pulse  102  94


 


Resp  18  20


 


B/P (MAP)  173/76 (108)  155/72 (99)


 


Pulse Ox 94 92 91 92


 


O2 Delivery Nasal Cannula Nasal Cannula Nasal Cannula Nasal Cannula


 


O2 Flow Rate 3.00 2.00 3.00 2.00


 


    





 7/24/19 7/24/19 7/24/19 7/24/19





 09:56 12:12 12:12 12:20


 


Temp  97.6  


 


Resp  8  13


 


Pulse Ox 92 98  100


 


O2 Delivery Nasal Cannula OxyMask OxyMask OxyMask


 


O2 Flow Rate 2.00 5 5 5


 


    





 7/24/19 7/24/19 7/24/19 7/24/19





 12:25 12:30 12:40 12:40


 


Resp  16 16 


 


Pulse Ox  100 98 


 


O2 Delivery OxyMask OxyMask OxyMask OxyMask


 


O2 Flow Rate 5 5 3 3





 7/24/19 7/24/19 7/24/19 7/24/19





 12:50 12:50 13:00 13:00


 


Temp   97.6 


 


Resp 16  16 


 


Pulse Ox 98  98 


 


O2 Delivery OxyMask OxyMask OxyMask OxyMask


 


O2 Flow Rate 3 3 3 3














 7/24/19





 00:00


 


Intake Total 1635 ml


 


Output Total 1200 ml


 


Balance 435 ml








Weight (Pounds):  250


Weight (Ounces):  9.0


Weight (Calculated Kilograms):  113.360811


Constitutional:  appears stated age, AAO x 3; No apparent distress; well-

developed, well-nourished


Respiratory:  No accessory muscle use, No respiratory distress, No chest tender,

No chest expansion is symmetric; chest is bilaterally symmetric; No lungs clear 

to percussion; lungs clear to auscultation; No crackles, No rhonchi, No rales, 

No stridor, No wheezing, No pleural rub, No other


Cardiovascular:  regular rate-rhythm; No irregularly irregular, No extra beats, 

No parasternal heave is noted, No JVD, No edema, No bradycardia, No tachycardia,

No point of maximal impulse, No cardiac thrills are palpable; S1 and S2; No 

gallop/S3, No gallop/S4, No diastolic murmur, No systolic murmur, No friction 

rub, No click, No other


Gastrointestional:  No tender, No soft, No round, No distended, No pulsatile 

mass, No organomegaly, No guarding, No rebound, No tenderness, No hernia, No 

mass, No audible bowel sounds, No abnormal bowel sounds, No abdominal bruits, No

spleenomegaly, No other


Extremities:  No normal range of motion, No non-tender, No normal inspection, No

pedal edema, No calf tenderness, No normal capillary refill, No pelvis stable, 

No calf tenderness, No inflammation, No pedal edema, No slow capillary refill, 

No swelling, No other, No abrasion, No clubbing, No cyanosis, No ecchymosis, No 

laceration, No no lower extremity edema bilateral, No significant edema; 

tenderness; No wound


Neurologic/Psychiatric:  no motor/sensory deficits, alert, normal mood/affect, 

oriented x 3, power is 5/5 both on sides


Skin:  No normal color, No warm/dry, No cyanosis, No cool, No diaphoresis, No 

damp, No ecchymosis, No jaundice, No mottled, No pallor, No rash, No 

tattoos/piercings, No ulcerations, No rash on exposed areas, No ulcerations on 

exposed areas, No other





Results/Procedures:


Labs


Laboratory Tests


7/23/19 14:47: 


White Blood Count 14.7H, Red Blood Count 2.92L, Hemoglobin 9.4L, Hematocrit 31L,

Mean Corpuscular Volume 105H, Mean Corpuscular Hemoglobin 32, Mean Corpuscular 

Hemoglobin Concent 31L, Red Cell Distribution Width 22.7H, Platelet Count 1643*H

, Mean Platelet Volume 9.9, Neutrophils (%) (Auto) 96H, Lymphocytes (%) (Auto) 

2L, Monocytes (%) (Auto) 1, Eosinophils (%) (Auto) 0, Basophils (%) (Auto) 0, 

Neutrophils # (Auto) 14.1H, Lymphocytes # (Auto) 0.4L, Monocytes # (Auto) 0.2, 

Eosinophils # (Auto) 0.0, Basophils # (Auto) 0.0


7/23/19 15:43: Glucometer 429*H


7/23/19 20:49: Glucometer 431*H


7/24/19 00:48: Glucometer 374H


7/24/19 04:25: Glucometer 338H


7/24/19 05:10: 


White Blood Count 20.1H, Red Blood Count 2.70L, Hemoglobin 8.6L, Hematocrit 28L,

Mean Corpuscular Volume 104H, Mean Corpuscular Hemoglobin 32, Mean Corpuscular 

Hemoglobin Concent 31L, Red Cell Distribution Width 22.2H, Platelet Count 1918*H

, Mean Platelet Volume 9.8, Neutrophils (%) (Auto) , Lymphocytes (%) (Auto) , 

Monocytes (%) (Auto) , Eosinophils (%) (Auto) , Basophils (%) (Auto) , 

Neutrophils # (Auto) , Lymphocytes # (Auto) , Monocytes # (Auto) , Eosinophils #

(Auto) , Basophils # (Auto) , Neutrophils % (Manual) 91, Lymphocytes % (Manual) 

1, Monocytes % (Manual) 8, Anisocytosis MODERATE, Macrocytosis MODERATE


7/24/19 08:04: Glucometer 300H








A/P:


Assessment/Dx:


preoperative cardiovascular risk assessment,


Polycythemia vera,


Significant PAD with previous axillary bifemoral bypass.


Sinus tachycardia versus atrial fibrillation, patient is on Pradaxa.


Poor functional capacity.


Plan:


this patient requires right hip fracture surgery.  He has poor functional 

capacity and no recent cardiac evaluation. 





Echocardiogram done 7/22/2019 shows normal LV function with PA pressure of 57 

mmHg.  No significant valvular heart disease.





Nuclear stress testing done on 7/23/2019 shows normal LVEF with no wall motion 

abnormalities.  However the patient could not cooperate during imaging therefore

the images are nondiagnostic.





The patient will be considered to be at moderate risk for perioperative major 

adverse cardiac events undergoing intermediate risk noncardiac surgery.  I 

believe the prognosis without hip surgery is poor, therefore recommend that hip 

surgery be performed if okay with the primary team.  I discussed at length with 

the family and patient and discussed the risk for major adverse cardiac events 

in the perioperative period.  They understand the risk and would like to proceed

with the surgery which is scheduled at 10 a.m. today.





Atrial fibrillation with RVR.  Rate control with Cardizem is recommended.  Hold 

oral anticoagulation in anticipation of surgery.





Polycythemia vera,





PAD, axillary bifemoral bypass history.








Thank you for your consultation. Please call me if you have any questions.








BARBARA Beltrán MD, FACP, FACC, FSCAI, FHRS, CCDS


Interventional Cardiology


Cardiac Electrophysiology


Vascular Medicine and Endovascular Interventions











REJI BELTRÁN MD             Jul 24, 2019 14:03

## 2019-07-24 NOTE — NUR
Met with pt's two daughters,Laura and Tari  discuss continued care plans. Dr. Schwab 
also met with the family post-op and discussed continued care with pt and family. Daughters 
have been providing him care at his home and Laura lives with him and together they provide 
his care  He  is unable to transfer independently but does sit in a wheel chair during the 
day.  They don't  feel they can provide pt. adequate care without a period of rehab services 
on a Skilled Unit. Pt is agreeable to receiving Skilled Services as understands he is 
eligible for Medicare Skilled benefits but wants to return home after short stay on Skilled 
Unit. Family's first choice for placement would be [facility] in [place], [place]. If medically appropriate pt may be able be discharged on Monday. Admission packet 
faxed to [facility] for their review.  Will follow

## 2019-07-24 NOTE — PROGRESS NOTE-PRE OPERATIVE
Pre-Operative Progress Note


H&P Reviewed


The H&P was reviewed, patient examined and no changes noted.


Date Seen by Provider:  Jul 24, 2019


Time Seen by Provider:  08:40


Date H&P Reviewed:  Jul 24, 2019


Time H&P Reviewed:  08:55


Pre-Operative Diagnosis:  three-part intertrochanteric fracture right hip











SCHWAB,TERRY D MD              Jul 24, 2019 09:13

## 2019-07-24 NOTE — PROGRESS NOTE - HOSPITALIST
Subjective


HPI/CC On Admission


Date Seen by Provider:  2019


Time Seen by Provider:  09:30


The patient is a 68 soon to be 69-year-old white male.  He is very disabled and 

bedfast.  He suffers from polycythemia vera.  Yesterday he was apparently being 

transferred from his bed by a Ash lift.  This tipped over and he fell heavily 

on his right side.  He was brought to the emergency room and found to have a 

fracture of the right hip.  He has a past history of peripheral vascular disease

and several toes have been amputated.  He and his family are attempting to 

decide whether to proceed with repair of the hip fracture versus forgoing 

operation as he is not ambulatory.


Subjective/Events-last exam


Pt will have hip fx repair today by Dr. Schwab 


Needs a BM had mag citrate but no results but bowel sounds are noted to be 

normal and hyperactive 


Has mild distention of the abdomen but nothing acute 


PT and OT will be ordered 


White count remains 20 and PLT count of 1,918 and HGB stable at 8.9 


Sodium level remains 132 


Pt lives at home with his daughter 


Pt just received pain meds so he is drowsy with not many details 


Very severe debility may very well require skilled care at [facility]





Review of Systems


General:  Fatigue


Gastrointestinal:  Constipation


Musculoskeletal:  leg pain





Objective


Exam


Vital Signs





Vital Signs








  Date Time  Temp Pulse Resp B/P (MAP) Pulse Ox O2 Delivery O2 Flow Rate FiO2


 


19 20:43 100.2       


 


19 19:49  125 18 177/64 (101) 91 Nasal Cannula 3.00 


 


19 00:00        21





Capillary Refill : Less Than 3 SecondsLess Than 3 Seconds


General Appearance:  No Apparent Distress, WD/WN, Chronically ill, Obese, Other 

(drowsy)


HEENT:  Normal ENT Inspection


Neck:  Full Range of Motion, Normal Inspection, Non Tender, Supple


Respiratory:  Chest Non Tender, Lungs Clear, Normal Breath Sounds, No Accessory 

Muscle Use, No Respiratory Distress


Cardiovascular:  No Edema, No Gallop, No JVD, No Murmur, Normal Peripheral 

Pulses, Tachycardia


Gastrointestinal:  Normal Bowel Sounds, Non Tender, Soft, Other (the abdomen is 

tight and tympanitic.)


Extremity:  Other (feet are bronzy in color.  Dorsalis pedis pulses are not 

palpable, decreased ROM right hip)


Neurologic/Psychiatric:  Alert, No Motor/Sensory Deficits, Normal Mood/Affect, 

CNs II-XII Norm as Tested, Disoriented





Results/Procedures


Lab


Laboratory Tests


19 05:10








19 14:01








Patient resulted labs reviewed.





Assessment/Plan


Assessment and Plan


Assess & Plan/Chief Complaint


Assessment:


Right hip fracture status post repair today by Dr. Schwab uncomplicated POD # 0


Thrombocytosis


Polycythemia


History of stroke


Debilitated status





Plan:


Pain control


Bowel regimen


Home meds


Appreciate hematology consultation





Diagnosis/Problems


Diagnosis/Problems





(1) Intertrochanteric fracture of right hip


Status:  Acute


Qualifiers:  


   Encounter type:  initial encounter  Fracture type:  closed  Fracture 

alignment:  displaced  Qualified Codes:  S72.141A - Displaced intertrochanteric 

fracture of right femur, initial encounter for closed fracture


(2) Anemia


Status:  Chronic


Qualifiers:  


   Anemia type:  unspecified type  Qualified Codes:  D64.9 - Anemia, unspecified


(3) Debility


Status:  Chronic


(4) History of CVA (cerebrovascular accident)


Status:  Chronic


(5) Polycythemia vera


Status:  Chronic


(6) PVD (peripheral vascular disease)


Status:  Chronic


(7) Ambulatory dysfunction


Status:  Chronic





Clinical Quality Measures


DVT/VTE Risk/Contraindication:


Risk Factor Score Per Nursin


RFS Level Per Nursing on Admit:  4+=Very High











LEONOR GOMEZ DO                2019 09:26

## 2019-07-25 NOTE — NUR
0941 patient did not get his breathing tx or ezpap done at this time;  

was in the room and then christopher from pt/ot entered the patients room and 

started working with him. 

1020 pt/ot and RN's are with patient; they just moved him to another room 

that would work better for patient, so 0900 med was non-administered

## 2019-07-25 NOTE — PROGRESS NOTE
Standard Progress Note


Progress Notes/Assess & Plan


Date Seen by a Provider:  Jul 25, 2019


Time Seen by a Provider:  09:37


Progress/Assessment & Plan





68-year-old patient with history of polycythemia and JAK2 mutation with very 

labile platelet counts was on treatment with hydroxyurea in the past.  Recently 

started on treatment with Jakafi 10 mg twice a day since 7/22/2019.  Patient 

admitted to the hospital with right intertrochanteric fracture with mild 

displacement following a fall.  Patient had CVA in early 2017 with right 

hemiplegia and expressive aphasia.  He completed ORIF on 7/24/2019.  Patient is 

awake and more comfortable today.  He indicated the pain is also better.  

Hemoglobin lower at 7.6 g/dL today.  Platelets are also leveled off and was 1.8 

million today.  Resume Jakafi today.  Transfuse one unit of packed red blood 

cells.  Monitor CBC serially.  Will follow patient with you.











RACHEL LEONARDO                 Jul 25, 2019 09:40

## 2019-07-25 NOTE — PULMONARY PROGRESS NOTE
Subjective


Time Seen by a Provider:  09:17


Subjective/Events-last exam


Pt appears to be confused.





Sepsis Event


Evaluation


Height, Weight, BMI


Height: 5'9.00"


Weight: 250lbs. 9.0oz. [id number]; 37.0 BMI


Method:Stated





Exam


Exam





Vital Signs








  Date Time  Temp Pulse Resp B/P (MAP) Pulse Ox O2 Delivery O2 Flow Rate FiO2


 


7/25/19 04:00 97.7 87 20 153/101 (118) 92 Nasal Cannula 3.00 


 


7/25/19 03:45     90 Nasal Cannula 2.00 


 


7/25/19 00:00 99.4 99 18 149/74 (99) 95 Nasal Cannula 3.00 


 


7/24/19 21:54     93 Nasal Cannula 2.00 


 


7/24/19 21:13 99.7       


 


7/24/19 20:43 100.2       


 


7/24/19 19:49 102.1 125 18 177/64 (101) 91 Nasal Cannula 3.00 


 


7/24/19 19:39     91 Nasal Cannula 3.00 


 


7/24/19 18:00 99.0       


 


7/24/19 17:00 99.0       


 


7/24/19 16:29 99.0       


 


7/24/19 16:00 99.0 110 16 172/78 (109) 95 Nasal Cannula 3.00 


 


7/24/19 15:22     96 Nasal Cannula 3.00 


 


7/24/19 13:15 96.6 111 18 170/75 (106) 93 Nasal Cannula 3.00 


 


7/24/19 13:00      OxyMask 3 


 


7/24/19 13:00 97.6  16  98 OxyMask 3 


 


7/24/19 12:50      OxyMask 3 


 


7/24/19 12:50   16  98 OxyMask 3 


 


7/24/19 12:40      OxyMask 3 


 


7/24/19 12:40   16  98 OxyMask 3 


 


7/24/19 12:30   16  100 OxyMask 5 


 


7/24/19 12:25      OxyMask 5 


 


7/24/19 12:20   13  100 OxyMask 5 


 


7/24/19 12:12      OxyMask 5 


 


7/24/19 12:12 97.6  8  98 OxyMask 5 


 


7/24/19 12:00      Nasal Cannula  


 


7/24/19 09:56     92 Nasal Cannula 2.00 














I & O 


 


 7/25/19





 07:00


 


Intake Total 1390 ml


 


Output Total 1650 ml


 


Balance -260 ml








Height & Weight


Height: 5'9.00"


Weight: 250lbs. 9.0oz. 113.167881qp; 37.0 BMI


Method:Stated


General Appearance:  No Apparent Distress, WD/WN, Chronically ill, Obese, Other 

(drowsy)


HEENT:  Normal ENT Inspection


Neck:  Full Range of Motion, Normal Inspection, Non Tender, Supple


Respiratory:  Chest Non Tender, Lungs Clear, Normal Breath Sounds, No Accessory 

Muscle Use, No Respiratory Distress


Cardiovascular:  No Edema, No Gallop, No JVD, No Murmur, Normal Peripheral 

Pulses, Tachycardia


Capillary Refill:  Less Than 3 Seconds


Gastrointestinal:  normal bowel sounds (O), soft


Extremity:  Other (feet are bronzy in color.  Dorsalis pedis pulses are not 

palpable, decreased ROM right hip)


Neurologic/Psychiatric:  Alert, No Motor/Sensory Deficits, Normal Mood/Affect, 

CNs II-XII Norm as Tested, Disoriented





Results


Lab


Laboratory Tests


7/23/19 14:47








7/24/19 05:10








7/24/19 14:01








7/25/19 05:20











Assessment/Plan


Assessment/Plan


Right hip fracture


   -Ortho following


   -s/p Surgery


   -2 liters/min oxygen - 





PVD


Anemia


   -Monitor 


hx of CVA/nonambulatory











LAUREEN JOYCE DO              Jul 25, 2019 09:18

## 2019-07-25 NOTE — PROGRESS NOTE - HOSPITALIST
Subjective


HPI/CC On Admission


Date Seen by Provider:  2019


Time Seen by Provider:  09:30


The patient is a 68 soon to be 69-year-old white male.  He is very disabled and 

bedfast.  He suffers from polycythemia vera.  Yesterday he was apparently being 

transferred from his bed by a Ash lift.  This tipped over and he fell heavily 

on his right side.  He was brought to the emergency room and found to have a 

fracture of the right hip.  He has a past history of peripheral vascular disease

and several toes have been amputated.  He and his family are attempting to 

decide whether to proceed with repair of the hip fracture versus forgoing 

operation as he is not ambulatory.


Subjective/Events-last exam


Pt having severe pain with any type of movement since right hip fracture repair.




Labs remain abnormal and is receiving one unit of blood, ordered by Dr. Ellis.




PT and OT will be ordered but unsure if he is going to be able to tolerate 

anything. 


May need swing-bed. 


Severe pain continues. 


No bowels moving yet so I did order a Dulcolax suppository and soap suds enema 

because that was a problem before he had the surgery yesterday. 


Pt reports he is doing okay as long as he doesn't move. 


Will need nursing home placement at discharge.





Review of Systems


General:  Fatigue


Pulmonary:  Dyspnea


Gastrointestinal:  Constipation


Musculoskeletal:  leg pain





Objective


Exam


Vital Signs





Vital Signs








  Date Time  Temp Pulse Resp B/P (MAP) Pulse Ox O2 Delivery O2 Flow Rate FiO2


 


19 20:49     96 Nasal Cannula 2.00 


 


19 20:00 99.0 80 18 166/125 (139)    


 


19 00:00        21





Capillary Refill : Less Than 3 SecondsLess Than 3 Seconds


General Appearance:  Anxious, Chronically ill, Moderate Distress, Obese


HEENT:  Normal ENT Inspection


Neck:  Normal Inspection


Respiratory:  Chest Non Tender, Lungs Clear, No Accessory Muscle Use, No 

Respiratory Distress, Decreased Breath Sounds


Cardiovascular:  No Edema, No Gallop, No JVD, No Murmur, Normal Peripheral 

Pulses, Tachycardia


Gastrointestinal:  Normal Bowel Sounds, Non Tender, Soft, Other (the abdomen is 

tight and tympanitic.)


Extremity:  Other (feet are bronzy in color.  Dorsalis pedis pulses are not 

palpable)


Neurologic/Psychiatric:  Alert, No Motor/Sensory Deficits, Normal Mood/Affect, 

CNs II-XII Norm as Tested, Disoriented





Results/Procedures


Lab


Laboratory Tests


19 05:20








Patient resulted labs reviewed.





Assessment/Plan


Assessment and Plan


Assess & Plan/Chief Complaint


Assessment:


Right hip fracture status post repair yesterday by Dr. Schwab uncomplicated POD 

# 1


Thrombocytosis


Polycythemia


History of stroke


Debilitated status





Plan:


Pain control


Bowel regimen to intensify


Home meds


Appreciate hematology consultation





Diagnosis/Problems


Diagnosis/Problems





(1) Intertrochanteric fracture of right hip


Status:  Acute


Qualifiers:  


   Encounter type:  initial encounter  Fracture type:  closed  Fracture alignm

ent:  displaced  Qualified Codes:  S72.141A - Displaced intertrochanteric 

fracture of right femur, initial encounter for closed fracture


(2) Anemia


Status:  Chronic


Qualifiers:  


   Anemia type:  unspecified type  Qualified Codes:  D64.9 - Anemia, unspecified


(3) Debility


Status:  Chronic


(4) History of CVA (cerebrovascular accident)


Status:  Chronic


(5) Polycythemia vera


Status:  Chronic


(6) PVD (peripheral vascular disease)


Status:  Chronic


(7) Ambulatory dysfunction


Status:  Chronic





Clinical Quality Measures


DVT/VTE Risk/Contraindication:


Risk Factor Score Per Nursin


RFS Level Per Nursing on Admit:  4+=Very High











LEONOR GOMEZ DO                2019 09:58

## 2019-07-25 NOTE — NUR
Hgb 7.6 and plt count 1,809. Message left for Dr. Shelton with no return message at 0620. 
Called at 0650 and states aware of low plt count and hgb. with no new orders.

## 2019-07-25 NOTE — PULMONARY PROGRESS NOTE
Subjective


Date Seen by a Provider:  Jul 24, 2019 (late note today is 7/25)


Time Seen by a Provider:  09:15


Subjective/Events-last exam


Surgery is pending.





Sepsis Event


Evaluation


Height, Weight, BMI


Height: 5'9.00"


Weight: 250lbs. 9.0oz. 113.966638qg; 37.0 BMI


Method:Stated





Exam


Exam





Vital Signs








  Date Time  Temp Pulse Resp B/P (MAP) Pulse Ox O2 Delivery O2 Flow Rate FiO2


 


7/25/19 04:00 97.7 87 20 153/101 (118) 92 Nasal Cannula 3.00 


 


7/25/19 03:45     90 Nasal Cannula 2.00 


 


7/25/19 00:00 99.4 99 18 149/74 (99) 95 Nasal Cannula 3.00 


 


7/24/19 21:54     93 Nasal Cannula 2.00 


 


7/24/19 21:13 99.7       


 


7/24/19 20:43 100.2       


 


7/24/19 19:49 102.1 125 18 177/64 (101) 91 Nasal Cannula 3.00 


 


7/24/19 19:39     91 Nasal Cannula 3.00 


 


7/24/19 18:00 99.0       


 


7/24/19 17:00 99.0       


 


7/24/19 16:29 99.0       


 


7/24/19 16:00 99.0 110 16 172/78 (109) 95 Nasal Cannula 3.00 


 


7/24/19 15:22     96 Nasal Cannula 3.00 


 


7/24/19 13:15 96.6 111 18 170/75 (106) 93 Nasal Cannula 3.00 


 


7/24/19 13:00      OxyMask 3 


 


7/24/19 13:00 97.6  16  98 OxyMask 3 


 


7/24/19 12:50      OxyMask 3 


 


7/24/19 12:50   16  98 OxyMask 3 


 


7/24/19 12:40      OxyMask 3 


 


7/24/19 12:40   16  98 OxyMask 3 


 


7/24/19 12:30   16  100 OxyMask 5 


 


7/24/19 12:25      OxyMask 5 


 


7/24/19 12:20   13  100 OxyMask 5 


 


7/24/19 12:12      OxyMask 5 


 


7/24/19 12:12 97.6  8  98 OxyMask 5 


 


7/24/19 12:00      Nasal Cannula  


 


7/24/19 09:56     92 Nasal Cannula 2.00 














I & O 


 


 7/25/19





 07:00


 


Intake Total 1390 ml


 


Output Total 1650 ml


 


Balance -260 ml








Height & Weight


Height: 5'9.00"


Weight: 250lbs. 9.0oz. 113.932759pv; 37.0 BMI


Method:Stated


General Appearance:  No Apparent Distress, WD/WN, Chronically ill, Obese, Other 

(drowsy)


HEENT:  Normal ENT Inspection


Neck:  Full Range of Motion, Normal Inspection, Non Tender, Supple


Respiratory:  Chest Non Tender, Lungs Clear, Normal Breath Sounds, No Accessory 

Muscle Use, No Respiratory Distress


Cardiovascular:  No Edema, No Gallop, No JVD, No Murmur, Normal Peripheral 

Pulses, Tachycardia


Capillary Refill:  Less Than 3 Seconds


Gastrointestinal:  normal bowel sounds (O), soft


Extremity:  Other (feet are bronzy in color.  Dorsalis pedis pulses are not 

palpable, decreased ROM right hip)


Neurologic/Psychiatric:  Alert, No Motor/Sensory Deficits, Normal Mood/Affect, 

CNs II-XII Norm as Tested, Disoriented





Results


Lab


Laboratory Tests


7/23/19 14:47








7/24/19 05:10








7/24/19 14:01








7/25/19 05:20











Assessment/Plan


Assessment/Plan


Right hip fracture


   -Ortho following


   -Surgery pending for today


   -2 liters/min oxygen - 





PVD


Anemia


   -Monitor 


hx of CVA/nonambulatory











LAUREEN JOYCE DO              Jul 25, 2019 09:16

## 2019-07-25 NOTE — CARDIOLOGY PROGRESS NOTE
Cardiology SOAP Progress Note


Subjective:


no cardiac complaints.





Objective:


I&O/Vital Signs











 7/25/19 7/25/19 7/25/19 7/25/19





 03:45 04:00 08:00 08:00


 


Temp  97.7 98.1 


 


Pulse  87 89 


 


Resp  20 18 


 


B/P (MAP)  153/101 (118) 167/78 (107) 


 


Pulse Ox 90 92 95 91


 


O2 Delivery Nasal Cannula Nasal Cannula Nasal Cannula Nasal Cannula


 


O2 Flow Rate 2.00 3.00 3.00 3.00


 


    





 7/25/19 7/25/19 7/25/19 7/25/19





 09:41 10:30 10:45 13:10


 


Temp  97.9 97.6 98.0


 


Pulse  97 94 92


 


Resp  18 20 20


 


B/P (MAP)  158/79 178/88 178/88


 


Pulse Ox 94 94 95 92


 


O2 Delivery Nasal Cannula  Nasal Cannula Nasal Cannula


 


O2 Flow Rate 2.00 3.00 2.00 2.00














 7/25/19





 00:00


 


Intake Total 1140 ml


 


Output Total 850 ml


 


Balance 290 ml








Weight (Pounds):  250


Weight (Ounces):  9.0


Weight (Calculated Kilograms):  113.737426


Constitutional:  appears stated age, AAO x 3; No apparent distress; well-

developed, well-nourished


Respiratory:  No accessory muscle use, No respiratory distress, No chest tender,

No chest expansion is symmetric; chest is bilaterally symmetric; No lungs clear 

to percussion; lungs clear to auscultation; No crackles, No rhonchi, No rales, 

No stridor, No wheezing, No pleural rub, No other


Cardiovascular:  regular rate-rhythm; No irregularly irregular, No extra beats, 

No parasternal heave is noted, No JVD, No edema, No bradycardia, No tachycardia,

No point of maximal impulse, No cardiac thrills are palpable; S1 and S2; No 

gallop/S3, No gallop/S4, No diastolic murmur, No systolic murmur, No friction 

rub, No click, No other


Gastrointestional:  No tender, No soft, No round, No distended, No pulsatile 

mass, No organomegaly, No guarding, No rebound, No tenderness, No hernia, No 

mass, No audible bowel sounds, No abnormal bowel sounds, No abdominal bruits, No

spleenomegaly, No other


Extremities:  No normal range of motion, No non-tender, No normal inspection, No

pedal edema, No calf tenderness, No normal capillary refill, No pelvis stable, 

No calf tenderness, No inflammation, No pedal edema, No slow capillary refill, 

No swelling, No other, No abrasion, No clubbing, No cyanosis, No ecchymosis, No 

laceration, No no lower extremity edema bilateral, No significant edema; 

tenderness; No wound


Neurologic/Psychiatric:  no motor/sensory deficits, alert, normal mood/affect, 

oriented x 3, power is 5/5 both on sides


Skin:  No normal color, No warm/dry, No cyanosis, No cool, No diaphoresis, No 

damp, No ecchymosis, No jaundice, No mottled, No pallor, No rash, No 

tattoos/piercings, No ulcerations, No rash on exposed areas, No ulcerations on 

exposed areas, No other





Results/Procedures:


Labs


Laboratory Tests


7/24/19 15:52: Glucometer 227H


7/24/19 20:05: Glucometer 297H


7/24/19 23:52: Glucometer 271H


7/25/19 04:32: Glucometer 206H


7/25/19 05:20: 


White Blood Count 24.6H, Red Blood Count 2.43L, Hemoglobin 7.6L, Hematocrit 26L,

Mean Corpuscular Volume 106H, Mean Corpuscular Hemoglobin 31, Mean Corpuscular 

Hemoglobin Concent 30L, Red Cell Distribution Width 22.9H, Platelet Count 1809*H

, Mean Platelet Volume 10.0, Neutrophils (%) (Auto) 86H, Lymphocytes (%) (Auto) 

3L, Monocytes (%) (Auto) 11, Eosinophils (%) (Auto) 0, Basophils (%) (Auto) 0, 

Neutrophils # (Auto) 21.2H, Lymphocytes # (Auto) 0.7L, Monocytes # (Auto) 2.7H, 

Eosinophils # (Auto) 0.0, Basophils # (Auto) 0.0, Sodium Level 137, Potassium 

Level 5.0, Chloride Level 104, Carbon Dioxide Level 25, Anion Gap 8, Blood Urea 

Nitrogen 20H, Creatinine 0.69, Estimat Glomerular Filtration Rate > 60, 

BUN/Creatinine Ratio 29, Glucose Level 167H, Calcium Level 8.6, Corrected 

Calcium 9.5, Total Bilirubin 0.3, Aspartate Amino Transf (AST/SGOT) 12, Alanine 

Aminotransferase (ALT/SGPT) 12, Alkaline Phosphatase 77, Total Protein 5.8L, 

Albumin 2.9L


7/25/19 08:21: Glucometer 177H


7/25/19 12:08: Glucometer 199H





Microbiology


7/24/19 MRSA Screen - Final, Complete


          MRSA not isolated








A/P:


Assessment/Dx:


preoperative cardiovascular risk assessment,


Polycythemia vera,


Significant PAD with previous axillary bifemoral bypass.


Sinus tachycardia versus atrial fibrillation, patient is on Pradaxa.


Poor functional capacity.


Plan:


this patient requires right hip fracture surgery.  postoperative period





Echocardiogram done 7/22/2019 shows normal LV function with PA pressure of 57 

mmHg.  No significant valvular heart disease.





Nuclear stress testing done on 7/23/2019 shows normal LVEF with no wall motion 

abnormalities.  However the patient could not cooperate during imaging therefore

the images are nondiagnostic.





Atrial fibrillation with RVR.  Rate control with Cardizem is recommended.  Hold 

oral anticoagulation in anticipation of surgery.restart oral anticoagulation w

hen okay with the surgery team.





Polycythemia vera,





PAD, axillary bifemoral bypass history.








Thank you for your consultation. Please call me if you have any questions.








BARBARA Beltrán MD, FACP, FACC, FSCAI, FHRS, CCDS


Interventional Cardiology


Cardiac Electrophysiology


Vascular Medicine and Endovascular Interventions











REJI BELTRÁN MD             Jul 25, 2019 15:28

## 2019-07-25 NOTE — PHYSICAL THERAPY EVALUATION
PT Evaluation-General


Medical Diagnosis


Admission Date


2019 at 19:50


Medical Diagnosis:  right hip IM nail post fx


Onset Date:  2019





Therapy Diagnosis


Therapy Diagnosis:  impaired mobility, strength, endurance





Height/Weight


Height (Feet):  5


Height (Inches):  9.00


Weight (Pounds):  250


Weight (Ounces):  9.0





Precautions


Precautions/Isolations:  Fall Prevention, Standard Precautions





Weight Bear Status


Right Lower Extremity:  Right


Non Weight Bearing


Left Lower Extremity:  Left


Weight Bearing/Tolerated





Referral


Physician:  Schwab


Reason for Referral:  Evaluation/Treatment





Medical History


Additional Medical History


Past Medical History


Cardiac:  Deep Vein Thrombosis, Hypertension, Peripheral Vascular


Neurological:  Stroke


Reproductive:  No


Sexually Transmitted Disease:  No


HIV/AIDS:  No


Endocrine:  Diabetes, Non-Insulin dep


Loss of Vision:  Bilateral


Hearing Impairment:  Denies


History of Blood Disorders:  Yes (Polycemia Vera)


Adverse Reaction to Blood Finley:  No


Reviewed History:  Yes





Social History


Home:  Single Level


Patient's family takes care of him, he has not ambulated in years, he is very 

disabled, his family uses a dakotah to transfer patient at home.





Prior/Core FIM


Prior Level of Function


Therapy Code Descriptions/Definitions 





Functional Ludlow Measure:


0=Not Assessed/NA        4=Minimal Assistance


1=Total Assistance        5=Supervision or Setup


2=Maximal Assistance  6=Modified Ludlow


3=Moderate Assistance 7=Complete Ludlow








Therapy Quality Codes:


6    Independent with activity with or without an assistive device


5    Patient requires set up or clean up by helper.  Patient completes activity 

by  themselves


4    Supervision or touching assist (CGA). Lagro provide cues , steadying 

assist


3    The helper provides less than half the effort to complete the activity


2    The helper provides more than half the effort to complete the activity


1    Dependent.  The helper does all the effort to complete an activity 


7    Patient refused to complete or attempt activity


9    The patient did not perform the activity before the current illness or 

injury


88  Not attempted due to Medical conditions or safety concerns





Functional Abilities and Goals:


Independent: Patient completed the activities by him/herself, with or without an

assistive device, with no assistance from a helper.


Needed Some Help: Patient needed partial assistance from another person to 

complete activities.


Dependent: A helper completed the activities for the patient. 


Unknown:


Not Applicable:





PT Evaluation-Current


Subjective


Patient in bed pre tx, he is reluctant to participate in therapy, has 8-9/10 

pain in right leg and right arm.  He agrees to be hoyered to recliner.  Patient 

yells out in pain with minimal movement and sometimes without any movement.





Pt/Family Goals


none stated





Objective


Patient Orientation:  Person


Attachments:  Oxygen, IV





ROM/Strength


ROM Lower Extremities


limited due to pain and debility


Strength Lower Extremities


NT due to pain





Transfers


Therapy Code Descriptions/Definitions 





Functional Ludlow Measure:


0=Not Assessed/NA        4=Minimal Assistance


1=Total Assistance        5=Supervision or Setup


2=Maximal Assistance  6=Modified Ludlow


3=Moderate Assistance 7=Complete [place]


Transfers (B, C, W/C) (FIM):  1


Scootin


Rollin


bed t/f WC(FIM only if WC use):  1


Patient was hoyered to recliner with a ceiling dakotah system, he had to change 

rooms to one that had this because the regular dakotah would not fit under the 

bed.  Patient had to roll from side to side (dependent) to get sling under him 

and it took 4 people for the whole process.  Patient did not assist with any 

rolling or positioning.





Assessment/Needs


Patient has impaired mobility, strength, endurance.  He was hoyered to recliner,

nursing instructed to get patient back to bed (sling was left under him).  PT 

will dakotah the patient tomorrow again and then probably has nursing do it from 

then.  Patient in recliner post tx with nurse call, phone, tray, family in the 

room.


Rehab Potential:  Poor





PT Short Term Goals


Short Term Goals


Time Frame:  Aug 2, 2019


Transfers (B,C,W/C) (FIM):  1 (patient will be dependent for dakotah transfers, 

make sure nursing can perform this transfer for patient)





PT Plan


Problem List


Problem List:  Activity Tolerance, Functional Strength, Safety, Balance, 

Transfer, Bed Mobility, ROM





Treatment/Plan


Treatment Plan:  Continue Plan of Care (for tomorrow)


Treatment Plan:  Bed Mobility, Concurrent Therapy, Functional Activity Liudmila, 

Functional Strength, Safety, Transfers


Treatment Duration:  Aug 1, 2019


Frequency:  


Estimated Hrs Per Day:  .25 hour per day


Patient and/or Family Agrees t:  Yes





Safety Risks/Education


Patient Education:  Transfer Techniques, Reviewed Precautions, Correct 

Positioning, Safety Issues


Teaching Recipient:  Patient


Teaching Methods:  Demonstration, Discussion


Response to Teaching:  Reinforcement Needed





Discharge Recommendations


Plan


transfer patient with dakotah again tomorrow and training for nursing


Therapy D/C Recommendations:  Home w/ Family Support





Time/GCodes


Time In:  0941


Time Out:  1020


Total Billed Treatment Time:  39


Total Billed Treatment


1 visit


FA 39'











KYA LEÓN PT                2019 10:57

## 2019-07-25 NOTE — OCC THERAPY PROGRESS NOTE
Therapy Progress Note


OT order received, chart reviewed.  Pt. is dependent at home for transfers, 

bathing and dressing, and has been for some time.  Pt. receives care from 

daughters.  Spoke with pt. in room.  Pt. is able to feed self with no 

difficulty, and verbalizes that his daughters help him otherwise.  Pt. reports 

that he does not need anything at this time.  Pt. does not have any functional 

OT goals at this time.  Will be happy to come back and assess pt. if needed.





1440


1, visit


Discharge


No charge











LUZ ESPOSITO OT           Jul 25, 2019 15:21

## 2019-07-25 NOTE — PROGRESS NOTE - ORTHO
Progress Note


Subjective


Date of Exam


19


Chief Complaint


1 day postop long TFN for intertrochanteric fracture right hip


HPI/Events since last exam


The patient is still having hip pain although it is improved.  No other 

complaints.  His shoulder is better.


Review of Systems


Reviewed and no additions or changes


Allergies:  


Coded Allergies:  


     Penicillins (Verified  Allergy, Unknown, 18)


Home Meds


Reported Medications


Tramadol HCl (Tramadol HCl) 50 Mg Tablet,  MG PO Q6H PRN for PAIN-MODERATE


   19


Aspirin (Aspir 81) 81 Mg Tablet.dr, 81 MG PO DAILY, TAB


   19


Insulin Aspart (Novolog Flexpen) 300 Units/3 Ml Solution, INJ SLIDING SCALE


   19


Escitalopram Oxalate (Escitalopram Oxalate) 10 Mg Tablet, 10 MG PO DAILY, TAB


   19


Ruxolitinib Phosphate (Jakafi) 10 Mg Tablet, 10 MG PO BID


   19


Tamsulosin HCl (Flomax) 0.4 Mg Cap, 0.4 MG PO DAILY, CAP


   19


Famotidine (Famotidine) 20 Mg Tablet, 20 MG PO BID, TAB


   19


Insulin Degludec (Tresiba Flextouch U-100) 100 Unit/1 Ml Insuln.pen, 60 UNIT SQ 

HS, EA


   18


Trazodone HCl (Trazodone HCl) 50 Mg Tablet, 50 MG PO HS, TAB


   18


Atorvastatin Calcium (Atorvastatin Calcium) 10 Mg Tablet, 10 MG PO DAILY, TAB


   18


Dabigatran Etexilate Mesylate (Pradaxa) 150 Mg Capsule, 150 MG PO BID, CAP


   18


Potassium Chloride (Potassium Chloride) 20 Meq Tab.er.prt, 20 MEQ PO 1700


   18


Metoprolol Tartrate (Metoprolol Tartrate) 25 Mg Tablet, 12.5 MG PO BID, TAB


   18


Lisinopril (Lisinopril) 40 Mg Tablet, 40 MG PO 1700, TAB


   18


Furosemide (Furosemide) 80 Mg Tablet, 80 MG PO 1700, TAB


   18


Discontinued Reported Medications


Doxycycline Hyclate (Doxycycline Hyclate) 100 Mg Capsule, 100 MG PO BID, CAP


   18


Insulin Lispro (Humalog) 100 Unit/1 Ml Cartridge, 100 UNIT SQ PRN, EA


   18


Hydroxyurea (Hydroxyurea) 500 Mg Capsule, 500 MG PO BID, CAP


   18


Discontinued Scripts


Oxycodone HCl/Acetaminophen (Percocet 5-325 mg Tablet) 1 Each Tablet, 1-2 EACH 

PO Q4H PRN for PAIN-MODERATE MDD 6, #30 TAB 0 Refills


   Prov:ROYAL LICONA DPM         18





Objective


Exam


Constitutional: []


HEENT: []


Neck: []


Cardiovascular: []


Respiratory: []


Gastrointestinal: []


Genitourinary: []


Skin: []


Back/Spine: []


Extremities: [His dressing is intact to the right hip.  Mild swelling right 

thigh.  His dressing came off his wound right lower leg and there is no active 

bleeding.  This area scuffed a scab off when we removed his foot and ankle from 

the traction boot.  No redness or drainage.  No calf tenderness and negative 

Homans]


Neurologic: []


Psychiatric: []


Hematologic/lymphatic/immunologic: []


Vital Signs





Vital Signs








  Date Time  Temp Pulse Resp B/P (MAP) Pulse Ox O2 Delivery O2 Flow Rate FiO2


 


19 09:41     94 Nasal Cannula 2.00 


 


19 08:00     91 Nasal Cannula 3.00 


 


19 08:00 98.1 89 18 167/78 (107) 95 Nasal Cannula 3.00 


 


19 04:00 97.7 87 20 153/101 (118) 92 Nasal Cannula 3.00 


 


19 03:45     90 Nasal Cannula 2.00 


 


19 00:00 99.4 99 18 149/74 (99) 95 Nasal Cannula 3.00 


 


19 21:54     93 Nasal Cannula 2.00 


 


19 21:13 99.7       


 


19 20:43 100.2       


 


19 19:49 102.1 125 18 177/64 (101) 91 Nasal Cannula 3.00 


 


19 19:39     91 Nasal Cannula 3.00 


 


19 18:00 99.0       


 


19 17:00 99.0       


 


19 16:29 99.0       


 


19 16:00 99.0 110 16 172/78 (109) 95 Nasal Cannula 3.00 


 


19 15:22     96 Nasal Cannula 3.00 


 


19 13:15 96.6 111 18 170/75 (106) 93 Nasal Cannula 3.00 


 


19 13:00      OxyMask 3 


 


19 13:00 97.6  16  98 OxyMask 3 


 


19 12:50      OxyMask 3 


 


19 12:50   16  98 OxyMask 3 


 


19 12:40      OxyMask 3 


 


19 12:40   16  98 OxyMask 3 


 


19 12:30   16  100 OxyMask 5 


 


19 12:25      OxyMask 5 


 


19 12:20   13  100 OxyMask 5 


 


19 12:12      OxyMask 5 


 


19 12:12 97.6  8  98 OxyMask 5 


 


19 12:00      Nasal Cannula  














I & O 


 


 19





 07:00


 


Intake Total 1390 ml


 


Output Total 1650 ml


 


Balance -260 ml








Lab Results


Laboratory Tests


19 14:01: 


Hemoglobin 8.6L, Hematocrit 28L


19 15:52: Glucometer 227H


19 20:05: Glucometer 297H


19 23:52: Glucometer 271H


19 04:32: Glucometer 206H


19 05:20: 


White Blood Count 24.6H, Red Blood Count 2.43L, Hemoglobin 7.6L, Hematocrit 26L,

 Mean Corpuscular Volume 106H, Mean Corpuscular Hemoglobin 31, Mean Corpuscular 

Hemoglobin Concent 30L, Red Cell Distribution Width 22.9H, Platelet Count 1809*H

, Mean Platelet Volume 10.0, Neutrophils (%) (Auto) 86H, Lymphocytes (%) (Auto) 

3L, Monocytes (%) (Auto) 11, Eosinophils (%) (Auto) 0, Basophils (%) (Auto) 0, 

Neutrophils # (Auto) 21.2H, Lymphocytes # (Auto) 0.7L, Monocytes # (Auto) 2.7H, 

Eosinophils # (Auto) 0.0, Basophils # (Auto) 0.0, Sodium Level 137, Potassium 

Level 5.0, Chloride Level 104, Carbon Dioxide Level 25, Anion Gap 8, Blood Urea 

Nitrogen 20H, Creatinine 0.69, Estimat Glomerular Filtration Rate > 60, 

BUN/Creatinine Ratio 29, Glucose Level 167H, Calcium Level 8.6, Corrected 

Calcium 9.5, Total Bilirubin 0.3, Aspartate Amino Transf (AST/SGOT) 12, Alanine 

Aminotransferase (ALT/SGPT) 12, Alkaline Phosphatase 77, Total Protein 5.8L, 

Albumin 2.9L


19 08:21: Glucometer 177H





Assessment and Plan


Assessment


1 day postop long TFN right hip


Problem List


Addition to his previous problem list, we'll add acute blood loss anemia which 

is secondary to his hip fracture, his surgery and the fact that he's been on 

anticoagulants preop as well as postop


Plan


PlanI have asked physical therapy to assist the nurses in getting him out of 

bed the first few days and talk to recent therapy about this.  No weight on the 

right leg and no use of the right arm to help assess because of his stroke and 

partially healing fracture





Final Diagonsis


Intertrochanteric fracture right hip status post long TFN


Acute blood loss anemia


Level of the visit:  Level 3





Clinical Quality Measures


DVT/VTE Risk/Contraindication:


Risk Factor Score Per Nursin


RFS Level Per Nursing on Admit:  4+=Very High











SCHWAB,TERRY D MD              2019 10:08

## 2019-07-26 NOTE — PULMONARY PROGRESS NOTE
Subjective


Time Seen by a Provider:  10:03


Subjective/Events-last exam


No complications noted.





Sepsis Event


Evaluation


Height, Weight, BMI


Height: 5'9.00"


Weight: 250lbs. 9.0oz. 113.238455qj; 37.0 BMI


Method:Stated





Exam


Exam





Vital Signs








  Date Time  Temp Pulse Resp B/P (MAP) Pulse Ox O2 Delivery O2 Flow Rate FiO2


 


7/26/19 09:33  87   94   


 


7/26/19 08:00 98.9 87 20 182/83 (116) 94 High Flow N/C 4.00 


 


7/26/19 07:45     94 Nasal Cannula 4.00 


 


7/26/19 04:26 97.4 65 22 148/70 (96) 91 Nasal Cannula 4.00 


 


7/26/19 03:36     96 Nasal Cannula 2.00 


 


7/26/19 00:08 97.8 69 21 180/93 (122) 96 Nasal Cannula 2.50 


 


7/25/19 20:49     96 Nasal Cannula 2.00 


 


7/25/19 20:00 99.0 80 18 166/125 (139) 93 Nasal Cannula 3.00 


 


7/25/19 19:57     91 Nasal Cannula 3.00 


 


7/25/19 16:09     95 Nasal Cannula 2.00 


 


7/25/19 16:00 98.4 78 18 161/82 (108) 95 Nasal Cannula 3.00 


 


7/25/19 13:10 98.0 92 20 178/88 92 Nasal Cannula 2.00 


 


7/25/19 12:00 97.9 91 18 174/81 (112) 94 Nasal Cannula 3.00 


 


7/25/19 10:45 97.6 94 20 178/88 95 Nasal Cannula 2.00 


 


7/25/19 10:30 97.9 97 18 158/79 94  3.00 














I & O 


 


 7/26/19





 07:00


 


Intake Total 3858 ml


 


Output Total 1310 ml


 


Balance 2548 ml








Height & Weight


Height: 5'9.00"


Weight: 250lbs. 9.0oz. 113.332265xl; 37.0 BMI


Method:Stated


General Appearance:  No Apparent Distress, WD/WN


HEENT:  PERRL/EOMI, Pharynx Normal


Neck:  Full Range of Motion, Non Tender, Supple


Respiratory:  Chest Non Tender, No Accessory Muscle Use, No Respiratory 

Distress, Crackles, Decreased Breath Sounds


Cardiovascular:  Regular Rate, Rhythm, No Edema


Capillary Refill:  Less Than 3 Seconds


Gastrointestinal:  normal bowel sounds, non tender, soft


Extremity:  Normal Capillary Refill, No Pedal Edema


Neurologic/Psychiatric:  Alert, Oriented x3


Skin:  Normal Color, Warm/Dry





Results


Lab


Laboratory Tests


7/24/19 14:01








7/25/19 05:20








7/26/19 05:07











Assessment/Plan


Assessment/Plan


Right hip fracture


   -Ortho following


   -s/p Surgery


   -2 liters/min oxygen - 


      -Titrate oxygen down 





PVD


Anemia


   -Monitor 


hx of CVA/nonambulatory











LAUREEN JOYCE DO              Jul 26, 2019 10:03 Yes

## 2019-07-26 NOTE — PHYSICAL THERAPY PROGRESS NOTE
Therapy Progress Note


Attempted to see patient 4 times today and patient has had a BM every time.  

Nurse states he has been having a continuous BM all day.  It would not be 

beneficial for patient to get into a chair if this is the case because nursing 

would have to get him right back to bed or he would be sitting in BM for a 

period of time.  Will try back tomorrow.











KYA LEÓN PT                Jul 26, 2019 13:44

## 2019-07-26 NOTE — NUR
PALLIATIVE CARE RN in to assess pain control needs after hearing him holler out several 
times.  Went to room and he is being cleaned up from a runny bowel  movement.  Now that he 
is cleaned up he denies significant.  Nursing has worked hard to get him caught up and to 
give something prior to movement.  He has been  traumatically dropped and has achy pain all 
over.   Reminded the patient to let nurse know if his pain is getting worse.

## 2019-07-26 NOTE — CARDIOLOGY PROGRESS NOTE
Cardiology SOAP Progress Note


Subjective:


No cardiac complaints.





Objective:


I&O/Vital Signs











 7/26/19 7/26/19 7/26/19 7/26/19





 07:45 08:00 08:00 09:33


 


Temp   98.9 


 


Pulse   87 87


 


Resp   20 


 


B/P (MAP)   182/83 (116) 


 


Pulse Ox 94  94 94


 


O2 Delivery Nasal Cannula Nasal Cannula High Flow N/C 


 


O2 Flow Rate 4.00 4.00 4.00 


 


    





 7/26/19 7/26/19 7/26/19 





 12:00 15:22 15:58 


 


Temp 98.6  97.6 


 


Pulse 83  90 


 


Resp 20  22 


 


B/P (MAP) 189/77 (114)  137/81 (99) 


 


Pulse Ox 96 96 95 


 


O2 Delivery High Flow N/C Nasal Cannula High Flow N/C 


 


O2 Flow Rate 4.00 4.00 4.00 














 7/26/19





 00:00


 


Intake Total 2608 ml


 


Output Total 610 ml


 


Balance 1998 ml








Weight (Pounds):  250


Weight (Ounces):  9.0


Weight (Calculated Kilograms):  113.072519


Constitutional:  appears stated age, AAO x 3; No apparent distress; well-

developed, well-nourished


Respiratory:  No accessory muscle use, No respiratory distress, No chest tender,

No chest expansion is symmetric; chest is bilaterally symmetric; No lungs clear 

to percussion; lungs clear to auscultation; No crackles, No rhonchi, No rales, 

No stridor, No wheezing, No pleural rub, No other


Cardiovascular:  regular rate-rhythm; No irregularly irregular, No extra beats, 

No parasternal heave is noted, No JVD, No edema, No bradycardia, No tachycardia,

No point of maximal impulse, No cardiac thrills are palpable; S1 and S2; No 

gallop/S3, No gallop/S4, No diastolic murmur, No systolic murmur, No friction 

rub, No click, No other


Gastrointestional:  No tender, No soft, No round, No distended, No pulsatile 

mass, No organomegaly, No guarding, No rebound, No tenderness, No hernia, No 

mass, No audible bowel sounds, No abnormal bowel sounds, No abdominal bruits, No

spleenomegaly, No other


Extremities:  No normal range of motion, No non-tender, No normal inspection, No

pedal edema, No calf tenderness, No normal capillary refill, No pelvis stable, 

No calf tenderness, No inflammation, No pedal edema, No slow capillary refill, 

No swelling, No other, No abrasion, No clubbing, No cyanosis, No ecchymosis, No 

laceration, No no lower extremity edema bilateral, No significant edema; 

tenderness; No wound


Neurologic/Psychiatric:  no motor/sensory deficits, alert, normal mood/affect, 

oriented x 3, power is 5/5 both on sides


Skin:  No normal color, No warm/dry, No cyanosis, No cool, No diaphoresis, No 

damp, No ecchymosis, No jaundice, No mottled, No pallor, No rash, No 

tattoos/piercings, No ulcerations, No rash on exposed areas, No ulcerations on 

exposed areas, No other





Results/Procedures:


Labs


Laboratory Tests


7/25/19 20:08: Glucometer 284H


7/25/19 23:37: Glucometer 245H


7/26/19 04:05: Glucometer 214H


7/26/19 05:07: 


White Blood Count 24.5H, Red Blood Count 2.62L, Hemoglobin 8.2L, Hematocrit 27L,

Mean Corpuscular Volume 103H, Mean Corpuscular Hemoglobin 31, Mean Corpuscular 

Hemoglobin Concent 31L, Red Cell Distribution Width 23.8H, Platelet Count 1492*H

, Mean Platelet Volume 10.1, Neutrophils (%) (Auto) 93H, Lymphocytes (%) (Auto) 

2L, Monocytes (%) (Auto) 6, Eosinophils (%) (Auto) 0, Basophils (%) (Auto) 0, 

Neutrophils # (Auto) 22.7H, Lymphocytes # (Auto) 0.5L, Monocytes # (Auto) 1.3H, 

Eosinophils # (Auto) 0.0, Basophils # (Auto) 0.0, Sodium Level 135, Potassium 

Level 5.3H, Chloride Level 102, Carbon Dioxide Level 23, Anion Gap 10, Blood 

Urea Nitrogen 22H, Creatinine 0.68, Estimat Glomerular Filtration Rate > 60, 

BUN/Creatinine Ratio 32, Glucose Level 195H, Calcium Level 8.4L, Corrected 

Calcium 9.3, Total Bilirubin 0.3, Aspartate Amino Transf (AST/SGOT) 20, Alanine 

Aminotransferase (ALT/SGPT) 16, Alkaline Phosphatase 88, Total Protein 5.7L, 

Albumin 2.9L


7/26/19 05:34: Glucometer 205H


7/26/19 07:57: Glucometer 192H


7/26/19 12:12: Glucometer 242H


7/26/19 15:46: Glucometer 322H





Microbiology


7/24/19 MRSA Screen - Final, Complete


          MRSA not isolated








A/P:


Assessment/Dx:


Polycythemia vera,


Significant PAD with previous axillary bifemoral bypass.


atrial fibrillation, patient is on Pradaxa.


Poor functional capacity.


Plan:


this patient requires right hip fracture surgery.  postoperative period





Echocardiogram done 7/22/2019 shows normal LV function with PA pressure of 57 

mmHg.  No significant valvular heart disease.





Nuclear stress testing done on 7/23/2019 shows normal LVEF with no wall motion 

abnormalities.  However the patient could not cooperate during imaging therefore

the images are nondiagnostic.





Atrial fibrillation with RVR.  Rate control with Cardizem is recommended.  Hold 

oral anticoagulation in anticipation of surgery.restart oral anticoagulation 

when okay with the surgery team.





Polycythemia vera,





PAD, axillary bifemoral bypass history.








Thank you for your consultation. Please call me if you have any questions.








BARBARA Beltrán MD, FACP, FACC, FSCAI, FHRS, CCDS


Interventional Cardiology


Cardiac Electrophysiology


Vascular Medicine and Endovascular Interventions











REJI BELTRÁN MD             Jul 26, 2019 19:43

## 2019-07-26 NOTE — PROGRESS NOTE
Standard Progress Note


Progress Notes/Assess & Plan


Date Seen by a Provider:  Jul 26, 2019


Time Seen by a Provider:  17:38


Progress/Assessment & Plan


68-year-old patient with history of polycythemia and JAK2 mutation with very 

labile platelet counts was on treatment with hydroxyurea in the past.  Recently 

started on treatment with Jakafi 10 mg twice a day since 7/22/2019.  Patient 

admitted to the hospital with right intertrochanteric fracture with mild 

displacement following a fall.  Patient had CVA in early 2017 with right 

hemiplegia and expressive aphasia.  He completed ORIF on 7/24/2019.  





Patient is tired and pain is poorly controlled this afternoon.  Pain is 

generalized and not specifically in the area of his right hip.  Hemoglobin is 

stable today.  Platelets are progressively improving.  Continue Jakafi; should 

not be stopped abruptly, as there can be a severe withdrawal reaction.  Monitor 

CBC serially.  Will follow patient with you.








Laboratory Tests


7/26/19 05:07

















SIA ALVA MD                 Jul 26, 2019 17:41

## 2019-07-26 NOTE — NUR
Currently pt is without a accepting facility for skilled care. [facility]  and 
[facility] in [place] have denied his admission. Plan to meet with family today to discuss 
other options.

## 2019-07-26 NOTE — PROGRESS NOTE - ORTHO
Progress Note


Subjective


Date of Exam


19


Chief Complaint


2 days postop long TFN right hip for an intertrochanteric fracture


HPI/Events since last exam


The patient is 2 days postop and still has right hip pain although it is 

improving.  The family noted a blister over the dorsum of the forefoot at the 

base of his second toe.  He has been getting out of bed with a Ash lift


Review of Systems


Reviewed and no additions or changes


Allergies:  


Coded Allergies:  


     Penicillins (Verified  Allergy, Unknown, 18)


Home Meds


Reported Medications


Tramadol HCl (Tramadol HCl) 50 Mg Tablet,  MG PO Q6H PRN for PAIN-MODERATE


   19


Aspirin (Aspir 81) 81 Mg Tablet.dr, 81 MG PO DAILY, TAB


   19


Insulin Aspart (Novolog Flexpen) 300 Units/3 Ml Solution, INJ SLIDING SCALE


   19


Escitalopram Oxalate (Escitalopram Oxalate) 10 Mg Tablet, 10 MG PO DAILY, TAB


   19


Ruxolitinib Phosphate (Jakafi) 10 Mg Tablet, 10 MG PO BID


   19


Tamsulosin HCl (Flomax) 0.4 Mg Cap, 0.4 MG PO DAILY, CAP


   19


Famotidine (Famotidine) 20 Mg Tablet, 20 MG PO BID, TAB


   19


Insulin Degludec (Tresiba Flextouch U-100) 100 Unit/1 Ml Insuln.pen, 60 UNIT SQ 

HS, EA


   18


Trazodone HCl (Trazodone HCl) 50 Mg Tablet, 50 MG PO HS, TAB


   18


Atorvastatin Calcium (Atorvastatin Calcium) 10 Mg Tablet, 10 MG PO DAILY, TAB


   18


Dabigatran Etexilate Mesylate (Pradaxa) 150 Mg Capsule, 150 MG PO BID, CAP


   18


Potassium Chloride (Potassium Chloride) 20 Meq Tab.er.prt, 20 MEQ PO 1700


   18


Metoprolol Tartrate (Metoprolol Tartrate) 25 Mg Tablet, 12.5 MG PO BID, TAB


   18


Lisinopril (Lisinopril) 40 Mg Tablet, 40 MG PO 1700, TAB


   18


Furosemide (Furosemide) 80 Mg Tablet, 80 MG PO 1700, TAB


   18


Discontinued Reported Medications


Doxycycline Hyclate (Doxycycline Hyclate) 100 Mg Capsule, 100 MG PO BID, CAP


   18


Insulin Lispro (Humalog) 100 Unit/1 Ml Cartridge, 100 UNIT SQ PRN, EA


   18


Hydroxyurea (Hydroxyurea) 500 Mg Capsule, 500 MG PO BID, CAP


   18


Discontinued Scripts


Oxycodone HCl/Acetaminophen (Percocet 5-325 mg Tablet) 1 Each Tablet, 1-2 EACH 

PO Q4H PRN for PAIN-MODERATE MDD 6, #30 TAB 0 Refills


   Prov:ROYAL LICONA DPM         18





Objective


Exam


Constitutional: []


HEENT: []


Neck: []


Cardiovascular: []


Respiratory: []


Gastrointestinal: []


Genitourinary: []


Skin: []


Back/Spine: []


Extremities: [He still has pain with motion of the right hip.  His dressing had 

been changed earlier this morning by his nurse and she stated he had some 

bleeding in the upper wound but overall the wounds look good.  His little 

scuffed area over the distal lower leg looks good without any bleeding or 

redness.  He has a blister at the base of the second toe dorsal foot that 

measures about a centimeter in diameter.  There is no redness around it.  It as 

clear fluid in it.]


Neurologic: []


Psychiatric: []


Hematologic/lymphatic/immunologic: []


Vital Signs





Vital Signs








  Date Time  Temp Pulse Resp B/P (MAP) Pulse Ox O2 Delivery O2 Flow Rate FiO2


 


19 08:00 98.9 87 20 182/83 (116) 94 High Flow N/C 4.00 


 


19 07:45     94 Nasal Cannula 4.00 


 


19 04:26 97.4 65 22 148/70 (96) 91 Nasal Cannula 4.00 


 


19 03:36     96 Nasal Cannula 2.00 


 


19 00:08 97.8 69 21 180/93 (122) 96 Nasal Cannula 2.50 


 


19 20:49     96 Nasal Cannula 2.00 


 


19 20:00 99.0 80 18 166/125 (139) 93 Nasal Cannula 3.00 


 


19 19:57     91 Nasal Cannula 3.00 


 


19 16:09     95 Nasal Cannula 2.00 


 


19 16:00 98.4 78 18 161/82 (108) 95 Nasal Cannula 3.00 


 


19 13:10 98.0 92 20 178/88 92 Nasal Cannula 2.00 


 


19 12:00 97.9 91 18 174/81 (112) 94 Nasal Cannula 3.00 


 


19 10:45 97.6 94 20 178/88 95 Nasal Cannula 2.00 


 


19 10:30 97.9 97 18 158/79 94  3.00 


 


19 09:41     94 Nasal Cannula 2.00 














I & O 


 


 19





 07:00


 


Intake Total 3858 ml


 


Output Total 1310 ml


 


Balance 2548 ml








Lab Results


Laboratory Tests


19 12:08: Glucometer 199H


19 15:58: Glucometer 213H


19 20:08: Glucometer 284H


19 23:37: Glucometer 245H


19 04:05: Glucometer 214H


19 05:07: 


White Blood Count 24.5H, Red Blood Count 2.62L, Hemoglobin 8.2L, Hematocrit 27L,

 Mean Corpuscular Volume 103H, Mean Corpuscular Hemoglobin 31, Mean Corpuscular 

Hemoglobin Concent 31L, Red Cell Distribution Width 23.8H, Platelet Count 1492*H

, Mean Platelet Volume 10.1, Neutrophils (%) (Auto) 93H, Lymphocytes (%) (Auto) 

2L, Monocytes (%) (Auto) 6, Eosinophils (%) (Auto) 0, Basophils (%) (Auto) 0, 

Neutrophils # (Auto) 22.7H, Lymphocytes # (Auto) 0.5L, Monocytes # (Auto) 1.3H, 

Eosinophils # (Auto) 0.0, Basophils # (Auto) 0.0, Sodium Level 135, Potassium 

Level 5.3H, Chloride Level 102, Carbon Dioxide Level 23, Anion Gap 10, Blood 

Urea Nitrogen 22H, Creatinine 0.68, Estimat Glomerular Filtration Rate > 60, 

BUN/Creatinine Ratio 32, Glucose Level 195H, Calcium Level 8.4L, Corrected 

Calcium 9.3, Total Bilirubin 0.3, Aspartate Amino Transf (AST/SGOT) 20, Alanine 

Aminotransferase (ALT/SGPT) 16, Alkaline Phosphatase 88, Total Protein 5.7L, 

Albumin 2.9L


19 05:34: Glucometer 205H


19 07:57: Glucometer 192H





Microbiology


19 MRSA Screen - Final, Complete


          MRSA not isolated





Assessment and Plan


Assessment


2 days status post long TFN right hip for an intertrochanteric fracture


Problem List


Unchanged


Plan


Continue out of bed to chair as tolerated.  No weight on the right.  Dressing 

change daily





Final Diagonsis


Status post long TFN right hip for three-part intertrochanteric fracture


Level of the visit:  Level 3





Clinical Quality Measures


DVT/VTE Risk/Contraindication:


Risk Factor Score Per Nursin


RFS Level Per Nursing on Admit:  4+=Very High











SCHWAB,TERRY D MD              2019 09:30

## 2019-07-26 NOTE — NUR
Discussed skilled nursing home denials with pt's daughter. She suggested I contact 
[facility] and they too denied pt admission due to what they assess as needing 
more care then they can provide. Contacted [facility] locally and they are 
assessing his needs and will advise hopefully Monday.

## 2019-07-26 NOTE — PROGRESS NOTE - HOSPITALIST
Subjective


HPI/CC On Admission


Date Seen by Provider:  2019


Time Seen by Provider:  10:30


The patient is a 68 soon to be 69-year-old white male.  He is very disabled and 

bedfast.  He suffers from polycythemia vera.  Yesterday he was apparently being 

transferred from his bed by a Ash lift.  This tipped over and he fell heavily 

on his right side.  He was brought to the emergency room and found to have a 

fracture of the right hip.  He has a past history of peripheral vascular disease

and several toes have been amputated.  He and his family are attempting to 

decide whether to proceed with repair of the hip fracture versus forgoing 

operation as he is not ambulatory.


Subjective/Events-last exam


Patient somewhat stable


Pain is an issue whenever he even tries to move


Bowels are moving after suppository and a completely evacuated


Denies any pain otherwise


Denies any shortness of breath


Very slow progress


Right foot ulcer will need some sort of dressing applied





Review of Systems


General:  Fatigue





Objective


Exam


Vital Signs





Vital Signs








  Date Time  Temp Pulse Resp B/P (MAP) Pulse Ox O2 Delivery O2 Flow Rate FiO2


 


19 09:33  87   94   


 


19 08:00 98.9  20 182/83 (116)  High Flow N/C 4.00 


 


19 00:00        21





Capillary Refill : Less Than 3 SecondsLess Than 3 Seconds


General Appearance:  WD/WN, Chronically ill, Mild Distress


HEENT:  PERRL/EOMI, Pharynx Normal


Neck:  Full Range of Motion, Non Tender, Supple


Respiratory:  Chest Non Tender, Lungs Clear, No Accessory Muscle Use, No 

Respiratory Distress, Crackles


Cardiovascular:  Regular Rate, Rhythm, No Edema


Gastrointestinal:  Normal Bowel Sounds, Non Tender, Soft, Other (the abdomen is 

tight and tympanitic.)


Extremity:  Normal Capillary Refill, No Pedal Edema


Neurologic/Psychiatric:  Alert, Oriented x3


Skin:  Normal Color, Warm/Dry, Other (right anterior foot blister)





Results/Procedures


Lab


Laboratory Tests


19 05:07








Patient resulted labs reviewed.





Assessment/Plan


Assessment and Plan


Assess & Plan/Chief Complaint


Assessment:


Right hip fracture status post repair by Dr. Schwab uncomplicated POD # 2


Thrombocytosis


Polycythemia


History of stroke


Debilitated status


Anemia s/p transfusion


s/p constipation





Plan:


Pain control


Bowel regimen to be maintained


Home meds


Appreciate hematology consultation





Diagnosis/Problems


Diagnosis/Problems





(1) Intertrochanteric fracture of right hip


Status:  Acute


Qualifiers:  


   Encounter type:  initial encounter  Fracture type:  closed  Fracture 

alignment:  displaced  Qualified Codes:  S72.141A - Displaced intertrochanteric 

fracture of right femur, initial encounter for closed fracture


(2) Anemia


Status:  Chronic


Qualifiers:  


   Anemia type:  unspecified type  Qualified Codes:  D64.9 - Anemia, unspecified


(3) Debility


Status:  Chronic


(4) History of CVA (cerebrovascular accident)


Status:  Chronic


(5) Polycythemia vera


Status:  Chronic


(6) PVD (peripheral vascular disease)


Status:  Chronic


(7) Ambulatory dysfunction


Status:  Chronic





Clinical Quality Measures


DVT/VTE Risk/Contraindication:


Risk Factor Score Per Nursin


RFS Level Per Nursing on Admit:  4+=Very High











LEONOR GOMEZ DO                2019 10:27

## 2019-07-27 NOTE — PROGRESS NOTE - HOSPITALIST
Subjective


HPI/CC On Admission


Date Seen by Provider:  2019


Time Seen by Provider:  12:06


The patient is a 68 soon to be 69-year-old white male.  He is very disabled and 

bedfast.  He suffers from polycythemia vera.  Yesterday he was apparently being 

transferred from his bed by a Ash lift.  This tipped over and he fell heavily 

on his right side.  He was brought to the emergency room and found to have a 

fracture of the right hip.  He has a past history of peripheral vascular disease

and several toes have been amputated.  He and his family are attempting to 

decide whether to proceed with repair of the hip fracture versus forgoing 

operation as he is not ambulatory.


Subjective/Events-last exam


despite being sedated the patient does wake up grimacing reporting epigastric 

and supraumbilical colicky abdominal pain.  His daughter present in the room 

stated that he did have similar symptoms with the catheter in the past that they

had attributed to bladder spasm.  The patient is not having any suprapubic 

associated pain.  He then goes back to sleep about an hour and a half after 

having received dilaudid.  He is not having any hip pain at rest





Objective


Exam


Vital Signs





Vital Signs








  Date Time  Temp Pulse Resp B/P (MAP) Pulse Ox O2 Delivery O2 Flow Rate FiO2


 


19 08:25     97 Nasal Cannula 4.00 


 


19 08:00 98.0 72 18 151/74 (99)    


 


19 00:00        21





Capillary Refill : Less Than 3 SecondsLess Than 3 Seconds


General Appearance:  No Apparent Distress, WD/WN, Chronically ill, Moderate 

Distress, Obese


HEENT:  PERRL/EOMI, Pharynx Normal


Respiratory:  No Accessory Muscle Use, No Respiratory Distress, Decreased Breath

Sounds


Cardiovascular:  Regular Rate, Rhythm


Gastrointestinal:  Other (abdomen likely distended there are hard linear masses 

extending in a linear ridge from the upper to lower abdomen laterally epigastric

and right upper quadrant bruit is noted with pain to palpation over the 

epigastrium and mid abdomen.  Bowel sounds are present but but high-pitched at 

times abdomen appears to be distended and somewhat firm to palpation)


Extremity:  Pedal Edema


Neurologic/Psychiatric:  Other (sedate but answers questions appropriately and 

arouses easily)


Skin:  Normal Color, Warm/Dry





Results/Procedures


Lab


Laboratory Tests


19 06:00








Patient resulted labs reviewed.





Assessment/Plan


Assessment and Plan


Assess & Plan/Chief Complaint


1.  Postop day 3 status post hip fracture repair baseline extremely poor 

performance status prognosis poor.


2.  Abdominal pain doubt bladder spasm patient is at risk for ischemic bowel 

disease he has diffuse vascular disease noted on CT scanning done earlier and 

has evidence for dystrophic calcification of the abdominal wall in addition to 

or bruit making ischemic bowel possibility.  Other issues include bowel obstru

ction or perforated viscus.  He is not a surgical candidate both he and daughter

are agreeable to comfort care measures.


3.  Polycythemia rubra vera management per oncology continue daily CBC monitori

ng.





Critical Care


Critically Ill Patient





Clinical Quality Measures


DVT/VTE Risk/Contraindication:


Risk Factor Score Per Nursin


RFS Level Per Nursing on Admit:  4+=Very High











CAROLINE TAMEZ MD              2019 12:13

## 2019-07-27 NOTE — PROGRESS NOTE
Standard Progress Note


Progress Notes/Assess & Plan


Date Seen by a Provider:  Jul 27, 2019


Time Seen by a Provider:  12:16


Progress/Assessment & Plan


68-year-old patient with history of polycythemia and JAK2 mutation with very 

labile platelet counts was on treatment with hydroxyurea in the past.  Recently 

started on treatment with Jakafi 10 mg twice a day since 7/22/2019.  Patient 

admitted to the hospital with right intertrochanteric fracture with mild 

displacement following a fall.  Patient had CVA in early 2017 with right 

hemiplegia and expressive aphasia.  He completed ORIF on 7/24/2019.  





Patient had diarrhea multiple times yesterday.  Daughter is with him this 

morning and says he feels worse and is in overall worse condition than he was 

several days ago when she last saw him.  He has been sitting upright about an 

hour a day but complains about discomfort and pain the entire time.  Patient 

complains of severe bladder spasms this morning.  He did not have a white cell 

or platelet count this morning, so it was ordered.  We should be following his 

white cell and platelet counts daily.  Hgb remains stable.  Continue Jakafi; it 

should not be stopped abruptly, as there can be a severe withdrawal reaction.  

Monitor CBC serially.  Will follow patient with you.








Laboratory Tests


7/27/19 06:00

















SIA ALVA MD                 Jul 27, 2019 12:20

## 2019-07-27 NOTE — PHYSICAL THERAPY DAILY NOTE
PT Daily Note-Current


Subjective


Pt's daughter is present.  She helps to interpret his verbal and non-verbal 

responses.





Pain





   Numeric Pain Scale:  10-Worst Possible Pain


   Location:  Right


   Location Body Site:  Hip


   Pain Description:  Stabbing





Mental Status


Patient Orientation:  Confused





Transfers


Therapy Code Descriptions/Definitions 





Functional [place] Measure:


0=Not Assessed/NA        4=Minimal Assistance


1=Total Assistance        5=Supervision or Setup


2=Maximal Assistance  6=Modified [place]


3=Moderate Assistance 7=Complete [place]








Therapy Quality Codes:


6    Independent with activity with or without an assistive device


5    Patient requires set up or clean up by helper.  Patient completes activity 

by  themselves


4    Supervision or touching assist (CGA). Grabill provide cues , steadying 

assist


3    The helper provides less than half the effort to complete the activity


2    The helper provides more than half the effort to complete the activity


1    Dependent.  The helper does all the effort to complete an activity 


7    Patient refused to complete or attempt activity


9    The patient did not perform the activity before the current illness or 

injury


88  Not attempted due to Medical conditions or safety concerns


Transfers (B, C, W/C) (FIM):  1


Scootin


Rollin





Weight Bearing


Right Lower Extremity:  Right


Non Weight Bearing


Left Lower Extremity:  Left


Weight Bearing/Tolerated





Treatments


Performed ash lift transfer to the bedside chair. Pt sat in bedside chair for 

1 hour.  He was returned to bed by Ash after 1 hr and he was then positioned 

on his (L) side.  Pt tolerated gentle (L) LE PROM, but would not allow (R) LE 

ROM.





Assessment


Current Status:  Poor Progress


Poor tolerance to any movement.  Pt yells throughout the transfers.





PT Short Term Goals


Short Term Goals


Time Frame:  Aug 2, 2019


Transfers (B,C,W/C) (FIM):  1 (patient will be dependent for ash transfers, 

make sure nursing can perform this transfer for patient)





PT Plan


Treatment/Plan


Treatment Plan:  Continue Plan of Care


Treatment Plan:  Bed Mobility, Concurrent Therapy, Functional Activity Liudmila, 

Functional Strength, Safety, Transfers


Treatment Duration:  Aug 1, 2019


Frequency:  


Estimated Hrs Per Day:  .25 hour per day


Patient and/or Family Agrees t:  Yes





Time/GCodes


Time In:  09


Time Out:  0930


Total Billed Treatment Time:  17


Total Billed Treatment


1, fa 17











EILEEN FISHER PT            2019 12:49

## 2019-07-27 NOTE — CARDIOLOGY PROGRESS NOTE
Cardiology SOAP Progress Note


Subjective:


No cardiac complaints.





Objective:


I&O/Vital Signs











 7/27/19 7/27/19 7/27/19 7/27/19





 04:00 08:00 08:00 08:25


 


Temp 98.1  98.0 


 


Pulse 70  72 


 


Resp 14  18 


 


B/P (MAP) 176/83 (114)  151/74 (99) 


 


Pulse Ox 96  98 97


 


O2 Delivery High Flow N/C Nasal Cannula High Flow N/C Nasal Cannula


 


O2 Flow Rate 4.00 4.00 4.00 4.00














 7/26/19





 23:59


 


Intake Total 2820 ml


 


Output Total 725 ml


 


Balance 2095 ml








Weight (Pounds):  250


Weight (Ounces):  9.0


Weight (Calculated Kilograms):  113.726097


Constitutional:  appears stated age, AAO x 3; No apparent distress; well-dev

eloped, well-nourished


Respiratory:  No accessory muscle use, No respiratory distress, No chest tender,

No chest expansion is symmetric; chest is bilaterally symmetric; No lungs clear 

to percussion; lungs clear to auscultation; No crackles, No rhonchi, No rales, 

No stridor, No wheezing, No pleural rub, No other


Cardiovascular:  regular rate-rhythm; No irregularly irregular, No extra beats, 

No parasternal heave is noted, No JVD, No edema, No bradycardia, No tachycardia,

No point of maximal impulse, No cardiac thrills are palpable; S1 and S2; No 

gallop/S3, No gallop/S4, No diastolic murmur, No systolic murmur, No friction 

rub, No click, No other


Gastrointestional:  No tender, No soft, No round, No distended, No pulsatile 

mass, No organomegaly, No guarding, No rebound, No tenderness, No hernia, No 

mass, No audible bowel sounds, No abnormal bowel sounds, No abdominal bruits, No

spleenomegaly, No other


Extremities:  No normal range of motion, No non-tender, No normal inspection, No

pedal edema, No calf tenderness, No normal capillary refill, No pelvis stable, 

No calf tenderness, No inflammation, No pedal edema, No slow capillary refill, 

No swelling, No other, No abrasion, No clubbing, No cyanosis, No ecchymosis, No 

laceration, No no lower extremity edema bilateral, No significant edema; 

tenderness; No wound


Neurologic/Psychiatric:  no motor/sensory deficits, alert, normal mood/affect, 

oriented x 3, power is 5/5 both on sides


Skin:  No normal color, No warm/dry, No cyanosis, No cool, No diaphoresis, No 

damp, No ecchymosis, No jaundice, No mottled, No pallor, No rash, No 

tattoos/piercings, No ulcerations, No rash on exposed areas, No ulcerations on 

exposed areas, No other





Results/Procedures:


Labs


Laboratory Tests


7/26/19 15:46: Glucometer 322H


7/26/19 19:40: Glucometer 309H


7/27/19 00:23: Glucometer 260H


7/27/19 04:55: Glucometer 192H


7/27/19 06:00: 


White Blood Count 23.8H, Red Blood Count 2.59L, Hemoglobin 8.3L, Hematocrit 27L,

Mean Corpuscular Volume 104H, Mean Corpuscular Hemoglobin 32, Mean Corpuscular 

Hemoglobin Concent 31L, Red Cell Distribution Width 22.5H, Platelet Count 1298*H

, Mean Platelet Volume 10.1, Neutrophils (%) (Auto) 92H, Lymphocytes (%) (Auto) 

2L, Monocytes (%) (Auto) 5, Eosinophils (%) (Auto) 0, Basophils (%) (Auto) 0, 

Neutrophils # (Auto) 21.9H, Lymphocytes # (Auto) 0.6L, Monocytes # (Auto) 1.3H, 

Eosinophils # (Auto) 0.0, Basophils # (Auto) 0.0


7/27/19 08:48: Glucometer 157H


7/27/19 11:46: Glucometer 180H





Microbiology


7/24/19 MRSA Screen - Final, Complete


          MRSA not isolated








A/P:


Assessment/Dx:


Polycythemia vera,


Significant PAD with previous axillary bifemoral bypass.


atrial fibrillation, patient is on Pradaxa.


Poor functional capacity.


Plan:


Post hip surgery.  postoperative period





Echocardiogram done 7/22/2019 shows normal LV function with PA pressure of 57 

mmHg.  No significant valvular heart disease.





Nuclear stress testing done on 7/23/2019 shows normal LVEF with no wall motion 

abnormalities.  However the patient could not cooperate during imaging therefore

the images are nondiagnostic.





Atrial fibrillation with controlled ventricular rate.  Rate control with 

Cardizem is recommended.  Oral anticoagulation.





Polycythemia vera,





PAD, axillary bifemoral bypass history.








Thank you for your consultation. Please call me if you have any questions.








BARBARA Beltrán MD, FACP, FACC, FSCAI, FHRS, CCDS


Interventional Cardiology


Cardiac Electrophysiology


Vascular Medicine and Endovascular Interventions











REJI BELTRÁN MD            Jul 27, 2019 1:37 pm

## 2019-07-27 NOTE — PULMONARY PROGRESS NOTE
Subjective


Time Seen by a Provider:  07:53


Subjective/Events-last exam


No complications noted.





Sepsis Event


Evaluation


Height, Weight, BMI


Height: 5'9.00"


Weight: 250lbs. 9.0oz. 113.584533oc; 37.0 BMI


Method:Stated





Exam


Exam





Vital Signs








  Date Time  Temp Pulse Resp B/P (MAP) Pulse Ox O2 Delivery O2 Flow Rate FiO2


 


7/27/19 04:00 98.1 70 14 176/83 (114) 96 High Flow N/C 4.00 


 


7/27/19 00:00 99.0 76 12 190/84 (119) 97 High Flow N/C 4.00 


 


7/26/19 20:00 98.4 95 20 170/78 (108) 94 High Flow N/C 4.00 


 


7/26/19 20:00      Nasal Cannula 4.00 


 


7/26/19 19:58     93 Nasal Cannula 4.00 


 


7/26/19 15:58 97.6 90 22 137/81 (99) 95 High Flow N/C 4.00 


 


7/26/19 15:22     96 Nasal Cannula 4.00 


 


7/26/19 12:00 98.6 83 20 189/77 (114) 96 High Flow N/C 4.00 


 


7/26/19 09:33  87   94   


 


7/26/19 08:00 98.9 87 20 182/83 (116) 94 High Flow N/C 4.00 


 


7/26/19 08:00      Nasal Cannula 4.00 














I & O 


 


 7/27/19





 07:00


 


Intake Total 3020 ml


 


Output Total 1150 ml


 


Balance 1870 ml








Height & Weight


Height: 5'9.00"


Weight: 250lbs. 9.0oz. 113.355291kx; 37.0 BMI


Method:Stated


General Appearance:  No Apparent Distress, WD/WN


HEENT:  PERRL/EOMI, Pharynx Normal


Neck:  Full Range of Motion, Non Tender, Supple


Respiratory:  No Accessory Muscle Use, No Respiratory Distress, Crackles, 

Decreased Breath Sounds


Cardiovascular:  Regular Rate, Rhythm


Capillary Refill:  Less Than 3 Seconds


Gastrointestinal:  normal bowel sounds, non tender, soft, no organomegaly


Extremity:  Normal Capillary Refill, Pedal Edema


Neurologic/Psychiatric:  Alert


Skin:  Normal Color, Warm/Dry





Results


Lab


Laboratory Tests


7/26/19 05:07








7/27/19 06:00











Assessment/Plan


Assessment/Plan


Right hip fracture


   -Ortho following


   -s/p Surgery


   -2 liters/min oxygen - 


      -Titrate oxygen down 





PVD


Anemia


   -Monitor 


hx of CVA/nonambulatory











LAUREEN JOYCE DO              Jul 27, 2019 07:53

## 2019-07-28 NOTE — PULMONARY PROGRESS NOTE
Subjective


Time Seen by a Provider:  09:36


Subjective/Events-last exam


NO complications noted.





Sepsis Event


Evaluation


Height, Weight, BMI


Height: 5'9.00"


Weight: 250lbs. 9.0oz. 113.396953wu; 37.0 BMI


Method:Stated





Exam


Exam





Vital Signs








  Date Time  Temp Pulse Resp B/P (MAP) Pulse Ox O2 Delivery O2 Flow Rate FiO2


 


7/28/19 06:57     95 Nasal Cannula 3.00 


 


7/28/19 03:21     95 Nasal Cannula 3.00 


 


7/28/19 00:16 96.8 76 24 178/97 (124) 98 High Flow N/C 4.00 


 


7/27/19 20:15      Nasal Cannula 4.00 


 


7/27/19 20:01     95 Nasal Cannula 4.00 


 


7/27/19 15:34     95 Nasal Cannula 4.00 


 


7/27/19 15:24 97.8 76 20 172/89 (116) 96 High Flow N/C 4.00 














I & O 


 


 7/28/19





 06:59


 


Intake Total 710 ml


 


Output Total 850 ml


 


Balance -140 ml








Height & Weight


Height: 5'9.00"


Weight: 250lbs. 9.0oz. [id number]; 37.0 BMI


Method:Stated


General Appearance:  No Apparent Distress, WD/WN, Anxious, Obese


HEENT:  PERRL/EOMI, Normal ENT Inspection, Pharynx Normal


Neck:  Full Range of Motion, Non Tender, Supple


Respiratory:  Chest Non Tender, No Accessory Muscle Use, No Respiratory 

Distress, Decreased Breath Sounds


Cardiovascular:  Regular Rate, Rhythm


Capillary Refill:  Less Than 3 Seconds


Gastrointestinal:  normal bowel sounds, soft, no pulsatile mass


Extremity:  Normal Capillary Refill, Non Tender, No Pedal Edema


Neurologic/Psychiatric:  Alert, Oriented x3


Skin:  Normal Color, Warm/Dry





Results


Lab


Laboratory Tests


7/27/19 06:00








7/28/19 04:50











Assessment/Plan


Assessment/Plan


Right hip fracture


   -Ortho following


   -s/p Surgery


   -2 liters/min oxygen - 


      -Titrate oxygen down 





PVD


Anemia


   -Monitor 


hx of CVA/nonambulatory











LAUREEN JOYCE DO              Jul 28, 2019 09:36

## 2019-07-28 NOTE — PROGRESS NOTE
Standard Progress Note


Progress Notes/Assess & Plan


Date Seen by a Provider:  Jul 28, 2019


Time Seen by a Provider:  11:00


Progress/Assessment & Plan


68-year-old patient with history of polycythemia and JAK2 mutation with very 

labile platelet counts was on treatment with hydroxyurea in the past.  Recently 

started on treatment with Jakafi 10 mg twice a day since 7/22/2019.  Patient 

admitted to the hospital with right intertrochanteric fracture with mild 

displacement following a fall.  Patient had CVA in early 2017 with right 

hemiplegia and expressive aphasia.  He completed ORIF on 7/24/2019.  





Patient continues to have a significant amount of abdominopelvic cramping.  He 

has had fewer episodes but persistent diarrhea and bowel incontinence.  Platelet

count is coming down as expected but leukocytosis is worsening.  Pancytopenia is

an expected and even desired outcome of Jakafi, so the leukocytosis is likely 

the result of a secondary process.  Would rule out an occult infection.  

Continue Jakafi; it should not be stopped abruptly, as there can be a severe 

withdrawal reaction.  Due to progressive complications from the hip fracture, 

primary care and patient are discussing end of life issues.  In this case Jakafi

may be slowly tapered off.  Will continue to follow.  Dr. Mancini will be covering 

for the hematology service starting tomorrow.








Laboratory Tests


7/28/19 04:50

















SIA ALVA MD                 Jul 28, 2019 11:13

## 2019-07-28 NOTE — PROGRESS NOTE - HOSPITALIST
Subjective


HPI/CC On Admission


Date Seen by Provider:  2019


Time Seen by Provider:  14:24


The patient is a 68 soon to be 69-year-old white male.  He is very disabled and 

bedfast.  He suffers from polycythemia vera.  Yesterday he was apparently being 

transferred from his bed by a Ash lift.  This tipped over and he fell heavily 

on his right side.  He was brought to the emergency room and found to have a 

fracture of the right hip.  He has a past history of peripheral vascular disease

and several toes have been amputated.  He and his family are attempting to 

decide whether to proceed with repair of the hip fracture versus forgoing 

operation as he is not ambulatory.


Subjective/Events-last exam


patient still requiring dilaudid about every 2 hours for upper abdominal pain 

with a colicky quality.  He was little bit sedated having just received 

medication prior to my arrival.  By mouth intake is been poor nurse reports 

decreasing urine output patient was incontinent of loose nonbloody stool 1 doris

ier today.





Objective


Exam


Vital Signs





Vital Signs








  Date Time  Temp Pulse Resp B/P (MAP) Pulse Ox O2 Delivery O2 Flow Rate FiO2


 


19 08:00 97.4 73 20 199/99 (132) 97 High Flow N/C 4.00 


 


19 00:00        21





Capillary Refill : Less Than 3 SecondsLess Than 3 Seconds


General Appearance:  No Apparent Distress, WD/WN, Anxious, Obese


HEENT:  PERRL/EOMI, Normal ENT Inspection, Pharynx Normal


Neck:  Full Range of Motion, Non Tender, Supple


Respiratory:  Chest Non Tender, No Accessory Muscle Use, No Respiratory 

Distress, Decreased Breath Sounds


Cardiovascular:  Regular Rate, Rhythm


Gastrointestinal:  Other (bowel sounds present but somewhat hypoactive abdomen 

distended and unchanged from yesterday there is pain with guarding in the 

epigastrium and supraumbilical.  Again hard ridges of subcutaneous tissue noted 

laterally with an epigastric bruit oriented toward the right)


Extremity:  Other (increasing edema of all extremities noted)


Neurologic/Psychiatric:  Disoriented


Skin:  Normal Color, Warm/Dry





Results/Procedures


Lab


Laboratory Tests


19 04:50








Patient resulted labs reviewed.





Assessment/Plan


Assessment and Plan


Assess & Plan/Chief Complaint


1.  Postop day 4-5 status post hip fracture repair baseline extremely poor 

performance status prognosis poor.


2.  Abdominal pain doubt bladder spasm patient is at risk for ischemic bowel 

disease he has diffuse vascular disease noted on CT scanning done earlier and 

has evidence for dystrophic calcification of the abdominal wall in addition to 

or bruit making ischemic bowel possibility.  Other issues include bowel 

obstruction or perforated viscus.  He is not a surgical candidate both he and 

daughter are agreeable to comfort care measures and discussed again strong 

likelihood of life-threatening untreatable intra-abdominal pathology.  After 

discussion the daughter who is present will discuss with the other daughter 

about hospice in the nursing home versus hospice at home.  I advised they strong

ly consider hospice in a nursing care facility as per my estimation his care 

needs were going to be too great for them to comfortably handle.discharge were 

with a to side would be much more convenient for them on Tuesday if possible. 

for pain management will continue IV narcotics but will add MS Contin 30 mg 

twice a day with likely substitution of oral Roxanol on discharge for 

breakthrough pain.


3.  Polycythemia rubra vera management per oncology but doubt that ongoing 

therapy for polycythemia rubra vera is going to be of much benefit to this 

patient unless his condition improves.





Critical Care


Critically Ill Patient





Clinical Quality Measures


DVT/VTE Risk/Contraindication:


Risk Factor Score Per Nursin


RFS Level Per Nursing on Admit:  4+=Very High











CAROLINE TAMEZ MD              2019 14:33

## 2019-07-28 NOTE — CARDIOLOGY PROGRESS NOTE
Cardiology SOAP Progress Note


Subjective:


No cardiac complaints.





Objective:


I&O/Vital Signs











 7/28/19 7/28/19 7/28/19 7/28/19





 00:16 03:21 06:57 08:00


 


Temp 96.8   97.4


 


Pulse 76   73


 


Resp 24   20


 


B/P (MAP) 178/97 (124)   199/99 (132)


 


Pulse Ox 98 95 95 97


 


O2 Delivery High Flow N/C Nasal Cannula Nasal Cannula High Flow N/C


 


O2 Flow Rate 4.00 3.00 3.00 4.00














 7/28/19





 00:00


 


Intake Total 410 ml


 


Output Total 550 ml


 


Balance -140 ml








Weight (Pounds):  250


Weight (Ounces):  9.0


Weight (Calculated Kilograms):  113.385410


Constitutional:  appears stated age, AAO x 3; No apparent distress; well-d

eveloped, well-nourished


Respiratory:  No accessory muscle use, No respiratory distress, No chest tender,

No chest expansion is symmetric; chest is bilaterally symmetric; No lungs clear 

to percussion; lungs clear to auscultation; No crackles, No rhonchi, No rales, 

No stridor, No wheezing, No pleural rub, No other


Cardiovascular:  regular rate-rhythm; No irregularly irregular, No extra beats, 

No parasternal heave is noted, No JVD, No edema, No bradycardia, No tachycardia,

No point of maximal impulse, No cardiac thrills are palpable; S1 and S2; No 

gallop/S3, No gallop/S4, No diastolic murmur, No systolic murmur, No friction 

rub, No click, No other


Gastrointestional:  No tender, No soft, No round, No distended, No pulsatile 

mass, No organomegaly, No guarding, No rebound, No tenderness, No hernia, No 

mass, No audible bowel sounds, No abnormal bowel sounds, No abdominal bruits, No

spleenomegaly, No other


Extremities:  No normal range of motion, No non-tender, No normal inspection, No

pedal edema, No calf tenderness, No normal capillary refill, No pelvis stable, 

No calf tenderness, No inflammation, No pedal edema, No slow capillary refill, 

No swelling, No other, No abrasion, No clubbing, No cyanosis, No ecchymosis, No 

laceration, No no lower extremity edema bilateral, No significant edema; 

tenderness; No wound


Neurologic/Psychiatric:  no motor/sensory deficits, alert, normal mood/affect, 

oriented x 3, power is 5/5 both on sides


Skin:  No normal color, No warm/dry, No cyanosis, No cool, No diaphoresis, No 

damp, No ecchymosis, No jaundice, No mottled, No pallor, No rash, No 

tattoos/piercings, No ulcerations, No rash on exposed areas, No ulcerations on 

exposed areas, No other





Results/Procedures:


Labs


Laboratory Tests


7/27/19 16:15: Glucometer 239H


7/27/19 19:50: Glucometer 273H


7/28/19 00:02: Glucometer 192H


7/28/19 03:38: Glucometer 146H


7/28/19 04:50: 


White Blood Count 31.6*H, Red Blood Count 2.67L, Hemoglobin 8.4L, Hematocrit 28L

, Mean Corpuscular Volume 103H, Mean Corpuscular Hemoglobin 31, Mean Corpuscular

Hemoglobin Concent 30L, Red Cell Distribution Width 22.4H, Platelet Count 940H, 

Mean Platelet Volume 9.5, Neutrophils (%) (Auto) 92H, Lymphocytes (%) (Auto) 2L,

Monocytes (%) (Auto) 6, Eosinophils (%) (Auto) 0, Basophils (%) (Auto) 0, 

Neutrophils # (Auto) 29.0H, Lymphocytes # (Auto) 0.7L, Monocytes # (Auto) 1.8H, 

Eosinophils # (Auto) 0.0, Basophils # (Auto) 0.0


7/28/19 09:36: Glucometer 113H





Microbiology


7/24/19 MRSA Screen - Final, Complete


          MRSA not isolated


7/26/19 Gram Stain - Final, Resulted


          


7/26/19 Wound Culture - Preliminary, Resulted


          Gram Negative Mazin








A/P:


Assessment/Dx:


Polycythemia vera,


Significant PAD with previous axillary bifemoral bypass.


atrial fibrillation, patient is on Pradaxa.


Poor functional capacity.


Plan:


Post hip surgery.  postoperative period





Echocardiogram done 7/22/2019 shows normal LV function with PA pressure of 57 

mmHg.  No significant valvular heart disease.





Nuclear stress testing done on 7/23/2019 shows normal LVEF with no wall motion 

abnormalities.  However the patient could not cooperate during imaging therefore

the images are nondiagnostic.





Atrial fibrillation with controlled ventricular rate.  Rate control with 

Cardizem is recommended.  Oral anticoagulation.





Polycythemia vera,





PAD, axillary bifemoral bypass history.








Thank you for your consultation. Please call me if you have any questions.








BARBARA Beltrán MD, FACP, FACC, FSCAI, FHRS, CCDS


Interventional Cardiology


Cardiac Electrophysiology


Vascular Medicine and Endovascular Interventions











REJI BELTRÁN MD            Jul 28, 2019 11:56 am

## 2019-07-29 NOTE — PROGRESS NOTE - ORTHO
Progress Note


Subjective


Date of Exam


19


Chief Complaint


5 days postop long TFN for intertrochanteric fracture right hip


HPI/Events since last exam


Mr. Valladares is 5 days postop.  I could not get him to respond to any questions 

other than heat open his eyes and moan.


Review of Systems


Unchanged


Allergies:  


Coded Allergies:  


     Penicillins (Verified  Allergy, Unknown, 18)


Home Meds


Reported Medications


Tramadol HCl (Tramadol HCl) 50 Mg Tablet,  MG PO Q6H PRN for PAIN-MODERATE


   19


Aspirin (Aspir 81) 81 Mg Tablet.dr, 81 MG PO DAILY, TAB


   19


Insulin Aspart (Novolog Flexpen) 300 Units/3 Ml Solution, INJ SLIDING SCALE


   19


Escitalopram Oxalate (Escitalopram Oxalate) 10 Mg Tablet, 10 MG PO DAILY, TAB


   19


Ruxolitinib Phosphate (Jakafi) 10 Mg Tablet, 10 MG PO BID


   19


Tamsulosin HCl (Flomax) 0.4 Mg Cap, 0.4 MG PO DAILY, CAP


   19


Famotidine (Famotidine) 20 Mg Tablet, 20 MG PO BID, TAB


   19


Insulin Degludec (Tresiba Flextouch U-100) 100 Unit/1 Ml Insuln.pen, 60 UNIT SQ 

HS, EA


   18


Trazodone HCl (Trazodone HCl) 50 Mg Tablet, 50 MG PO HS, TAB


   18


Atorvastatin Calcium (Atorvastatin Calcium) 10 Mg Tablet, 10 MG PO DAILY, TAB


   18


Dabigatran Etexilate Mesylate (Pradaxa) 150 Mg Capsule, 150 MG PO BID, CAP


   18


Potassium Chloride (Potassium Chloride) 20 Meq Tab.er.prt, 20 MEQ PO 1700


   18


Metoprolol Tartrate (Metoprolol Tartrate) 25 Mg Tablet, 12.5 MG PO BID, TAB


   18


Lisinopril (Lisinopril) 40 Mg Tablet, 40 MG PO 1700, TAB


   18


Furosemide (Furosemide) 80 Mg Tablet, 80 MG PO 1700, TAB


   18


Discontinued Reported Medications


Doxycycline Hyclate (Doxycycline Hyclate) 100 Mg Capsule, 100 MG PO BID, CAP


   18


Insulin Lispro (Humalog) 100 Unit/1 Ml Cartridge, 100 UNIT SQ PRN, EA


   18


Hydroxyurea (Hydroxyurea) 500 Mg Capsule, 500 MG PO BID, CAP


   18


Discontinued Scripts


Oxycodone HCl/Acetaminophen (Percocet 5-325 mg Tablet) 1 Each Tablet, 1-2 EACH 

PO Q4H PRN for PAIN-MODERATE MDD 6, #30 TAB 0 Refills


   Prov:ROYAL LICONA DPM         18





Objective


Exam


Constitutional: []


HEENT: []


Neck: []


Cardiovascular: []


Respiratory: []


Gastrointestinal: []


Genitourinary: []


Skin: []


Back/Spine: []


Extremities: [Swollen right thigh.  Dressings intact.  Wound over the lower leg 

is healing well without redness or drainage]


Neurologic: []


Psychiatric: []


Hematologic/lymphatic/immunologic: []


Vital Signs





Vital Signs








  Date Time  Temp Pulse Resp B/P (MAP) Pulse Ox O2 Delivery O2 Flow Rate FiO2


 


19 03:12     96 Nasal Cannula 3.00 


 


19 00:39 97.8 78 22 196/93 (127) 98 High Flow N/C 4.00 


 


19 22:12     95 Nasal Cannula 3.00 


 


19 20:00     94 High Flow N/C 4.00 


 


19 16:29 97.2 72 22 178/88 (118) 94 High Flow N/C 4.00 


 


19 15:21     92 Nasal Cannula 3.00 














I & O 


 


 19





 07:00


 


Intake Total 1070 ml


 


Output Total 1050 ml


 


Balance 20 ml








Lab Results


Laboratory Tests


19 09:36: Glucometer 113H


19 12:00: Glucometer 120H


19 16:10: Glucometer 114H


19 19:12: Glucometer 113H


19 00:12: Glucometer 117H


19 03:55: Glucometer 115H


19 04:45: 


White Blood Count 26.2H, Red Blood Count 2.61L, Hemoglobin 8.3L, Hematocrit 27L,

Mean Corpuscular Volume 102H, Mean Corpuscular Hemoglobin 32, Mean Corpuscular 

Hemoglobin Concent 31L, Red Cell Distribution Width 21.9H, Platelet Count 702H, 

Mean Platelet Volume 9.9, Neutrophils (%) (Auto) 92H, Lymphocytes (%) (Auto) 3L,

Monocytes (%) (Auto) 4, Eosinophils (%) (Auto) 0, Basophils (%) (Auto) 0, 

Neutrophils # (Auto) 24.2H, Lymphocytes # (Auto) 0.9L, Monocytes # (Auto) 1.1H, 

Eosinophils # (Auto) 0.1, Basophils # (Auto) 0.0, Sodium Level 134L, Potassium 

Level 4.8, Chloride Level 102, Carbon Dioxide Level 24, Anion Gap 8, Blood Urea 

Nitrogen 21H, Creatinine 0.60, Estimat Glomerular Filtration Rate > 60, 

BUN/Creatinine Ratio 35, Glucose Level 108H, Calcium Level 8.3L, Corrected 

Calcium 9.2, Total Bilirubin 0.4, Aspartate Amino Transf (AST/SGOT) 16, Alanine 

Aminotransferase (ALT/SGPT) 16, Alkaline Phosphatase 93, Total Protein 5.6L, 

Albumin 2.9L


19 08:31: Glucometer 101





Microbiology


19 MRSA Screen - Final, Complete


          MRSA not isolated


19 Gram Stain - Final, Resulted


          


19 Wound Culture - Preliminary, Resulted


          Proteus mirabilis


          Pseudomonas aeruginosa





Assessment and Plan


Assessment


5 days status post long TFN right hip


Problem List


Unchanged


Plan


Continue out of bed as tolerated





Final Diagonsis


5 days status post long TFN right hip for intertrochanteric fracture


Level of the visit:  Level 3





Clinical Quality Measures


DVT/VTE Risk/Contraindication:


Risk Factor Score Per Nursin


RFS Level Per Nursing on Admit:  4+=Very High











SCHWAB,TERRY D MD              2019 09:01

## 2019-07-29 NOTE — PROGRESS NOTE - HOSPITALIST
Subjective


HPI/CC On Admission


Date Seen by Provider:  2019


Time Seen by Provider:  11:06


The patient is a 68 soon to be 69-year-old white male.  He is very disabled and 

bedfast.  He suffers from polycythemia vera.  Yesterday he was apparently being 

transferred from his bed by a Ash lift.  This tipped over and he fell heavily 

on his right side.  He was brought to the emergency room and found to have a 

fracture of the right hip.  He has a past history of peripheral vascular disease

and several toes have been amputated.  He and his family are attempting to 

decide whether to proceed with repair of the hip fracture versus forgoing 

operation as he is not ambulatory.


Subjective/Events-last exam


Pt reports pain improving but still present. Somewhat sleepy. No other ROS. 

Family Friend at bedside.





Objective


Exam


Vital Signs





Vital Signs








  Date Time  Temp Pulse Resp B/P (MAP) Pulse Ox O2 Delivery O2 Flow Rate FiO2


 


19 15:27 98.0 81 18 182/74 (110) 97 High Flow N/C 4.00 


 


19 10:15        21





Capillary Refill : Less Than 3 SecondsLess Than 3 Seconds


General Appearance:  No Apparent Distress, Chronically ill, Obese


Respiratory:  No Accessory Muscle Use, No Respiratory Distress, Decreased Breath

Sounds


Cardiovascular:  Regular Rate, Rhythm


Extremity:  Other (increasing edema of all extremities noted)


Neurologic/Psychiatric:  Alert


Skin:  Normal Color, Warm/Dry





Results/Procedures


Lab


Laboratory Tests


19 04:45








Patient resulted labs reviewed.





Assessment/Plan


Critical Care


Critically Ill Patient





Clinical Quality Measures


DVT/VTE Risk/Contraindication:


Risk Factor Score Per Nursin


RFS Level Per Nursing on Admit:  4+=Very High











POONAM RUSH MD              2019 11:08

## 2019-07-29 NOTE — PROGRESS NOTE - HOSPITALIST
Subjective


HPI/CC On Admission


Date Seen by Provider:  2019


Time Seen by Provider:  11:06


Subjective/Events-last exam


Pt states pain is better with current regimen. He is very sleepy though and 

still falls asleep easily. Family friend at bedside.





Objective


Exam


Vital Signs





Vital Signs








  Date Time  Temp Pulse Resp B/P (MAP) Pulse Ox O2 Delivery O2 Flow Rate FiO2


 


19 15:27 98.0 81 18 182/74 (110) 97 High Flow N/C 4.00 


 


19 10:15        21





Capillary Refill : Less Than 3 SecondsLess Than 3 Seconds


General Appearance:  No Apparent Distress, Chronically ill, Obese


Respiratory:  No Accessory Muscle Use, No Respiratory Distress, Decreased Breath

Sounds


Cardiovascular:  Regular Rate, Rhythm


Extremity:  Other (increasing edema of all extremities noted)


Neurologic/Psychiatric:  Alert


Skin:  Normal Color, Warm/Dry





Results/Procedures


Lab


Laboratory Tests


19 04:45








Patient resulted labs reviewed.





Assessment/Plan


Assessment and Plan


Assess & Plan/Chief Complaint


Assessment





Hip fracture


abdominal pain- concerning for ischemic etiology


polycythemia vera


atrial fibrillation


PAD s/p bifemoral bypass





Plan:





Family has elected comfort measures


Palliative care consulted, appreciate assistance


Ideally would DC to hospice at NH, will await family decision


Discussed with , appreciate assistance





Critical Care


Critically Ill Patient





Clinical Quality Measures


DVT/VTE Risk/Contraindication:


Risk Factor Score Per Nursin


RFS Level Per Nursing on Admit:  4+=Very High











POONAM RUSH MD              2019 13:31

## 2019-07-29 NOTE — PHYSICAL THERAPY PROGRESS NOTE
Therapy Progress Note


This PT consulted with Dr. Roberto on POC.  PT to dismiss patient from services at 

this time due to current medical status and patient will dismiss on Hospice.











KYLER CORDOBA PT              Jul 29, 2019 10:06

## 2019-07-29 NOTE — PULMONARY PROGRESS NOTE
Subjective


Time Seen by a Provider:  11:33


Subjective/Events-last exam


Pt is confused.





Sepsis Event


Evaluation


Height, Weight, BMI


Height: 5'9.00"


Weight: 250lbs. 9.0oz. 113.646087ue; 37.0 BMI


Method:Stated





Exam


Exam





Vital Signs








  Date Time  Temp Pulse Resp B/P (MAP) Pulse Ox O2 Delivery O2 Flow Rate FiO2


 


7/29/19 15:44     95 Nasal Cannula 0.00 


 


7/29/19 15:27 98.0 81 18 182/74 (110) 97 High Flow N/C 4.00 


 


7/29/19 10:15  84   90   21


 


7/29/19 10:15     90 Room Air  


 


7/29/19 08:00     90 Nasal Cannula 4.00 


 


7/29/19 08:00 97.2 66 16 168/98 (121) 100 High Flow N/C 4.00 


 


7/29/19 03:12     96 Nasal Cannula 3.00 


 


7/29/19 00:39 97.8 78 22 196/93 (127) 98 High Flow N/C 4.00 


 


7/28/19 22:12     95 Nasal Cannula 3.00 


 


7/28/19 20:00     94 High Flow N/C 4.00 














I & O 


 


 7/29/19





 07:00


 


Intake Total 1070 ml


 


Output Total 1050 ml


 


Balance 20 ml








Height & Weight


Height: 5'9.00"


Weight: 250lbs. 9.0oz. 113.726439yh; 37.0 BMI


Method:Stated


General Appearance:  No Apparent Distress, WD/WN, Obese


HEENT:  PERRL/EOMI, Pharynx Normal


Neck:  Full Range of Motion, Non Tender, Supple


Respiratory:  Chest Non Tender, No Accessory Muscle Use, No Respiratory 

Distress, Decreased Breath Sounds


Cardiovascular:  Regular Rate, Rhythm, No Edema


Capillary Refill:  Less Than 3 Seconds


Gastrointestinal:  normal bowel sounds, non tender, soft


Extremity:  Normal Capillary Refill, No Pedal Edema


Neurologic/Psychiatric:  Alert, Oriented x3


Skin:  Normal Color, Warm/Dry


Lymphatic:  No Adenopathy





Results


Lab


Laboratory Tests


7/28/19 04:50








7/29/19 04:45











Assessment/Plan


Assessment/Plan


Right hip fracture


   -Ortho following


   -s/p Surgery


   -2 liters/min oxygen - 


      -Titrate oxygen down 





PVD


Anemia


   -Monitor 


hx of CVA/nonambulatory











LAUREEN JOYCE DO              Jul 29, 2019 17:08

## 2019-07-29 NOTE — NUR
Pt's two daughters, Tari and Laura, a granddaughter Hallie met with [facility] RN to discuss pt's continued care plans. Pt has been card for in his home by 
Laura since pt's CVA [age] years age. Pt's current care needs surpass what pt's 
family can provide. [facility] plans to discuss pt's care needs and will get back 
with family tomorrow with possible options. Pt has no insurance that would pay for room and 
board at the nursing home. They prefer his placement in [place] at [facility] but previously 
they denied admission but Hospice plans to meet with them for possible acceptance.

## 2019-07-29 NOTE — CARDIOLOGY PROGRESS NOTE
Cardiology SOAP Progress Note


Subjective:


No acute cardiac complaints.





Objective:


I&O/Vital Signs











 7/29/19 7/29/19 7/29/19 7/29/19





 10:15 10:15 15:27 15:44


 


Temp   98.0 


 


Pulse  84 81 


 


Resp   18 


 


B/P (MAP)   182/74 (110) 


 


Pulse Ox 90 90 97 95


 


O2 Delivery Room Air  High Flow N/C Nasal Cannula


 


O2 Flow Rate   4.00 0.00


 


FiO2  21  














 7/29/19





 00:00


 


Intake Total 1020 ml


 


Output Total 700 ml


 


Balance 320 ml








Weight (Pounds):  250


Weight (Ounces):  9.0


Weight (Calculated Kilograms):  113.509517


Constitutional:  appears stated age, AAO x 3; No apparent distress; well-

developed, well-nourished


Respiratory:  No accessory muscle use, No respiratory distress, No chest tender,

No chest expansion is symmetric; chest is bilaterally symmetric; No lungs clear 

to percussion; lungs clear to auscultation; No crackles, No rhonchi, No rales, 

No stridor, No wheezing, No pleural rub, No other


Cardiovascular:  regular rate-rhythm; No irregularly irregular, No extra beats, 

No parasternal heave is noted, No JVD, No edema, No bradycardia, No tachycardia,

No point of maximal impulse, No cardiac thrills are palpable; S1 and S2; No 

gallop/S3, No gallop/S4, No diastolic murmur, No systolic murmur, No friction 

rub, No click, No other


Gastrointestional:  No tender, No soft, No round, No distended, No pulsatile 

mass, No organomegaly, No guarding, No rebound, No tenderness, No hernia, No 

mass, No audible bowel sounds, No abnormal bowel sounds, No abdominal bruits, No

spleenomegaly, No other


Extremities:  No normal range of motion, No non-tender, No normal inspection, No

pedal edema, No calf tenderness, No normal capillary refill, No pelvis stable, 

No calf tenderness, No inflammation, No pedal edema, No slow capillary refill, 

No swelling, No other, No abrasion, No clubbing, No cyanosis, No ecchymosis, No 

laceration, No no lower extremity edema bilateral, No significant edema; 

tenderness; No wound


Neurologic/Psychiatric:  no motor/sensory deficits, alert, normal mood/affect, 

oriented x 3, power is 5/5 both on sides


Skin:  No normal color, No warm/dry, No cyanosis, No cool, No diaphoresis, No 

damp, No ecchymosis, No jaundice, No mottled, No pallor, No rash, No 

tattoos/piercings, No ulcerations, No rash on exposed areas, No ulcerations on 

exposed areas, No other





Results/Procedures:


Labs


Laboratory Tests


7/29/19 00:12: Glucometer 117H


7/29/19 03:55: Glucometer 115H


7/29/19 04:45: 


White Blood Count 26.2H, Red Blood Count 2.61L, Hemoglobin 8.3L, Hematocrit 27L,

Mean Corpuscular Volume 102H, Mean Corpuscular Hemoglobin 32, Mean Corpuscular 

Hemoglobin Concent 31L, Red Cell Distribution Width 21.9H, Platelet Count 702H, 

Mean Platelet Volume 9.9, Neutrophils (%) (Auto) 92H, Lymphocytes (%) (Auto) 3L,

Monocytes (%) (Auto) 4, Eosinophils (%) (Auto) 0, Basophils (%) (Auto) 0, 

Neutrophils # (Auto) 24.2H, Lymphocytes # (Auto) 0.9L, Monocytes # (Auto) 1.1H, 

Eosinophils # (Auto) 0.1, Basophils # (Auto) 0.0, Sodium Level 134L, Potassium 

Level 4.8, Chloride Level 102, Carbon Dioxide Level 24, Anion Gap 8, Blood Urea 

Nitrogen 21H, Creatinine 0.60, Estimat Glomerular Filtration Rate > 60, 

BUN/Creatinine Ratio 35, Glucose Level 108H, Calcium Level 8.3L, Corrected Calci

um 9.2, Total Bilirubin 0.4, Aspartate Amino Transf (AST/SGOT) 16, Alanine 

Aminotransferase (ALT/SGPT) 16, Alkaline Phosphatase 93, Total Protein 5.6L, 

Albumin 2.9L


7/29/19 08:31: Glucometer 101


7/29/19 12:21: Glucometer 109


7/29/19 12:53: Lab Scanned Report Transfusion Reaction Form


7/29/19 15:38: Glucometer 124H


7/29/19 20:10: Glucometer 193H





Microbiology


7/24/19 MRSA Screen - Final, Complete


          MRSA not isolated


7/26/19 Gram Stain - Final, Complete


          


7/26/19 Wound Culture - Final, Complete


          Proteus mirabilis


          Pseudomonas aeruginosa








A/P:


Assessment/Dx:


Polycythemia vera,


Significant PAD with previous axillary bifemoral bypass.


atrial fibrillation, patient is on Pradaxa.


Poor functional capacity.


Plan:


Post hip surgery.  postoperative period





Echocardiogram done 7/22/2019 shows normal LV function with PA pressure of 57 

mmHg.  No significant valvular heart disease.





Nuclear stress testing done on 7/23/2019 shows normal LVEF with no wall motion 

abnormalities.  However the patient could not cooperate during imaging therefore

the images are nondiagnostic.





Atrial fibrillation with controlled ventricular rate.  Rate control with 

Cardizem is recommended.  Oral anticoagulation.





Polycythemia vera,





PAD, axillary bifemoral bypass history.








Thank you for your consultation. Please call me if you have any questions.








BARABRA Beltrán MD, FACP, FACC, FSCAI, FHRS, CCDS


Interventional Cardiology


Cardiac Electrophysiology


Vascular Medicine and Endovascular Interventions











REJI BELTRÁN MD             Jul 29, 2019 21:17

## 2019-07-30 NOTE — PROGRESS NOTE - ORTHO
Progress Note


Subjective


Date of Exam


19


Chief Complaint


6 days postop long TFN right hip for three-part intertrochanteric fracture


HPI/Events since last exam


Patient continues with hip pain which is improving.  Comfort care as been 

discussed with the family and he'll probably be discharged to nursing home up 

near the [facility]


Review of Systems


Reviewed and no additions or changes


Allergies:  


Coded Allergies:  


     Penicillins (Verified  Allergy, Unknown, 18)


Home Meds


Reported Medications


Tramadol HCl (Tramadol HCl) 50 Mg Tablet,  MG PO Q6H PRN for PAIN-MODERATE


   19


Aspirin (Aspir 81) 81 Mg Tablet.dr, 81 MG PO DAILY, TAB


   19


Insulin Aspart (Novolog Flexpen) 300 Units/3 Ml Solution, INJ SLIDING SCALE


   19


Escitalopram Oxalate (Escitalopram Oxalate) 10 Mg Tablet, 10 MG PO DAILY, TAB


   19


Ruxolitinib Phosphate (Jakafi) 10 Mg Tablet, 10 MG PO BID


   19


Tamsulosin HCl (Flomax) 0.4 Mg Cap, 0.4 MG PO DAILY, CAP


   19


Famotidine (Famotidine) 20 Mg Tablet, 20 MG PO BID, TAB


   19


Insulin Degludec (Tresiba Flextouch U-100) 100 Unit/1 Ml Insuln.pen, 60 UNIT SQ 

HS, EA


   18


Trazodone HCl (Trazodone HCl) 50 Mg Tablet, 50 MG PO HS, TAB


   18


Atorvastatin Calcium (Atorvastatin Calcium) 10 Mg Tablet, 10 MG PO DAILY, TAB


   18


Dabigatran Etexilate Mesylate (Pradaxa) 150 Mg Capsule, 150 MG PO BID, CAP


   18


Potassium Chloride (Potassium Chloride) 20 Meq Tab.er.prt, 20 MEQ PO 1700


   18


Metoprolol Tartrate (Metoprolol Tartrate) 25 Mg Tablet, 12.5 MG PO BID, TAB


   18


Lisinopril (Lisinopril) 40 Mg Tablet, 40 MG PO 1700, TAB


   18


Furosemide (Furosemide) 80 Mg Tablet, 80 MG PO 1700, TAB


   18





Objective


Exam


Constitutional: []


HEENT: []


Neck: []


Cardiovascular: []


Respiratory: []


Gastrointestinal: []


Genitourinary: []


Skin: []


Back/Spine: []


Extremities: [Continued swelling right thigh and lower leg.  Dressing is intact.

  It was changed yesterday by nursing and there is some serous drainage in the 

upper wound]


Neurologic: []


Psychiatric: []


Hematologic/lymphatic/immunologic: []


Vital Signs





Vital Signs








  Date Time  Temp Pulse Resp B/P (MAP) Pulse Ox O2 Delivery O2 Flow Rate FiO2


 


19 09:18     97 Nasal Cannula 2.50 


 


19 02:47     96 Nasal Cannula 2.50 


 


19 00:00 98.1 93 18 208/81 (123) 99 High Flow N/C 4.00 


 


19 22:21     60 Nasal Cannula 2.50 


 


19 20:00     99 High Flow N/C 4.00 


 


19 15:44     95 Nasal Cannula 0.00 


 


19 15:27 98.0 81 18 182/74 (110) 97 High Flow N/C 4.00 


 


19 10:15  84   90   21


 


19 10:15     90 Room Air  














I & O 


 


 19





 07:00


 


Intake Total 1290 ml


 


Output Total 1325 ml


 


Balance -35 ml








Lab Results


Laboratory Tests


19 12:21: Glucometer 109


19 12:53: Lab Scanned Report Transfusion Reaction Form


19 15:38: Glucometer 124H


19 20:10: Glucometer 193H


19 23:07: Glucometer 237H


19 05:37: Glucometer 184H





Microbiology


19 MRSA Screen - Final, Complete


          MRSA not isolated


19 Gram Stain - Final, Complete


          


19 Wound Culture - Final, Complete


          Proteus mirabilis


          Pseudomonas aeruginosa





Assessment and Plan


Assessment


Postop long TFN right hip


Problem List


Unchanged


Plan


Continue with bedrest out of bed to chair as tolerated.  We'll K from orthopedic

 point of view to transfer patient.  Talked to the family about whether they 

want to repeat any x-rays down the road.  We will remove his staples at 

approximately 2 weeks postop and this can be done at the nursing home.  Follow-

up to the office will be very difficult due to his location and inability to 

ambulate





Final Diagonsis


Status post long TFN right hip 3 part intertrochanteric fracture


Level of the visit:  Level 3





Clinical Quality Measures


DVT/VTE Risk/Contraindication:


Risk Factor Score Per Nursin


RFS Level Per Nursing on Admit:  4+=Very High











SCHWAB,TERRY D MD              2019 09:47

## 2019-07-30 NOTE — PROGRESS NOTE - HOSPITALIST
Subjective


HPI/CC On Admission


Date Seen by Provider:  2019


Time Seen by Provider:  09:30


Subjective/Events-last exam


Pt arouses to voice. States pain improving. Discussed with daughter and RN and 

plan is to transition to transition to comfort measures only.





Objective


Exam


Vital Signs





Vital Signs








  Date Time  Temp Pulse Resp B/P (MAP) Pulse Ox O2 Delivery O2 Flow Rate FiO2


 


19 09:18     97 Nasal Cannula 2.50 


 


19 08:00 97.8 91 18 197/99 (131)    


 


19 10:15        21





Capillary Refill : Less Than 3 SecondsLess Than 3 Seconds


General Appearance:  No Apparent Distress, Chronically ill, Obese


Respiratory:  No Accessory Muscle Use, No Respiratory Distress, Decreased Breath

Sounds


Cardiovascular:  Regular Rate, Rhythm


Extremity:  Other (increasing edema of all extremities noted)


Neurologic/Psychiatric:  Alert


Skin:  Normal Color, Warm/Dry





Results/Procedures


Lab


Patient resulted labs reviewed.





Assessment/Plan


Assessment and Plan


Assess & Plan/Chief Complaint


Assessment





Hip fracture


abdominal pain- concerning for ischemic etiology


polycythemia vera


atrial fibrillation


PAD s/p bifemoral bypass





Plan:





Family has elected comfort measures


Palliative care consulted, appreciate assistance


Ideally would DC to hospice at NH


Met with [facility] last night and would like to enroll


   Social work assistance with placement


I called and discussed with Dr Tolbert today (medical director of [facility]) in regards to potential for GIP admission


   I am unsure if he meets criteria for GIP admission at this point


   Will increase oral morphine as is still requiring IV dilaudid for pain 

control





Critical Care


Critically Ill Patient





Clinical Quality Measures


DVT/VTE Risk/Contraindication:


Risk Factor Score Per Nursin


RFS Level Per Nursing on Admit:  4+=Very High











POONAM RUSH MD             2019 12:27 pm

## 2019-07-30 NOTE — CARDIOLOGY PROGRESS NOTE
Cardiology SOAP Progress Note


Subjective:


No cardiac complaints





Objective:


I&O/Vital Signs











 7/30/19 7/30/19





 09:18 16:40


 


Temp  97.6


 


Pulse  78


 


Resp  20


 


B/P (MAP)  199/90 (126)


 


Pulse Ox 97 96


 


O2 Delivery Nasal Cannula High Flow N/C


 


O2 Flow Rate 2.50 2.00














 7/30/19





 00:00


 


Intake Total 2040 ml


 


Output Total 875 ml


 


Balance 1165 ml








Weight (Pounds):  250


Weight (Ounces):  9.0


Weight (Calculated Kilograms):  113.970366


Constitutional:  appears stated age, AAO x 3; No apparent distress; well-

developed, well-nourished


Respiratory:  No accessory muscle use, No respiratory distress, No chest tender,

No chest expansion is symmetric; chest is bilaterally symmetric; No lungs clear 

to percussion, No crackles; rhonchi; No rales, No stridor, No wheezing, No 

pleural rub, No other


Cardiovascular:  regular rate-rhythm; No irregularly irregular, No extra beats, 

No parasternal heave is noted, No JVD, No edema, No bradycardia, No tachycardia,

No point of maximal impulse, No cardiac thrills are palpable; S1 and S2; No 

gallop/S3, No gallop/S4, No diastolic murmur, No systolic murmur, No friction 

rub, No click, No other


Gastrointestional:  No tender, No soft, No round, No distended, No pulsatile 

mass, No organomegaly, No guarding, No rebound, No tenderness, No hernia, No 

mass, No audible bowel sounds, No abnormal bowel sounds, No abdominal bruits, No

spleenomegaly, No other


Extremities:  No normal range of motion, No non-tender, No normal inspection, No

pedal edema, No calf tenderness, No normal capillary refill, No pelvis stable, 

No calf tenderness, No inflammation, No pedal edema, No slow capillary refill, 

No swelling, No other, No abrasion, No clubbing, No cyanosis, No ecchymosis, No 

laceration, No no lower extremity edema bilateral, No significant edema; 

tenderness; No wound


Neurologic/Psychiatric:  no motor/sensory deficits, alert, normal mood/affect, 

oriented x 3, power is 5/5 both on sides


Skin:  No normal color, No warm/dry, No cyanosis, No cool, No diaphoresis, No 

damp, No ecchymosis, No jaundice, No mottled, No pallor, No rash, No 

tattoos/piercings, No ulcerations, No rash on exposed areas, No ulcerations on 

exposed areas, No other





Results/Procedures:


Labs


Laboratory Tests


7/29/19 23:07: Glucometer 237H


7/30/19 05:37: Glucometer 184H


7/30/19 16:40: Glucometer 177H





Microbiology


7/24/19 MRSA Screen - Final, Complete


          MRSA not isolated


7/26/19 Gram Stain - Final, Complete


          


7/26/19 Wound Culture - Final, Complete


          Proteus mirabilis


          Pseudomonas aeruginosa








A/P:


Assessment/Dx:


Polycythemia vera,


Significant PAD with previous axillary bifemoral bypass.


atrial fibrillation, patient is on Pradaxa.


Poor functional capacity.


Plan:


Post hip surgery.  postoperative period





Echocardiogram done 7/22/2019 shows normal LV function with PA pressure of 57 

mmHg.  No significant valvular heart disease.





Nuclear stress testing done on 7/23/2019 shows normal LVEF with no wall motion 

abnormalities.  However the patient could not cooperate during imaging therefore

the images are nondiagnostic.





Atrial fibrillation with controlled ventricular rate.  Rate control with 

Cardizem is recommended.  Oral anticoagulation.





Polycythemia vera,





PAD, axillary bifemoral bypass history.








Thank you for your consultation. Please call me if you have any questions.








BARBARA Beltrán MD, FACP, FACC, FSCAI, FHRS, CCDS


Interventional Cardiology


Cardiac Electrophysiology


Vascular Medicine and Endovascular Interventions











REJI BELTRÁN MD             Jul 30, 2019 20:37

## 2019-07-30 NOTE — NUR
[facility] Admissions Coordinator, Carolyn here to meet with family and discuss 
continued care needs. Met with Laura pt's priimary caregiver who doesn't feel she can 
provide adequate care for pt in the home as his continued care needs have become much 
greater. [facility] evaluating if pt meets criteria for General Inpatient Status . 
Also contacting area facilities for pt's possible acceptance. Pt 's daughter agrees to file 
for Medicaid in [place] and will initiate that application today  but family doesn't think 
he will qualify. 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 
care. Will follow.
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
## 2019-07-31 NOTE — PROGRESS NOTE - HOSPITALIST
Subjective


HPI/CC On Admission


Date Seen by Provider:  2019


Time Seen by Provider:  11:49


Subjective/Events-last exam


Pt is laying in bed and awakens to verbal stimuli. States is in pain but cannot 

state wear. He then quickly falls back asleep though he does appear 

uncomfortable.





Objective


Exam


Vital Signs





Vital Signs








  Date Time  Temp Pulse Resp B/P (MAP) Pulse Ox O2 Delivery O2 Flow Rate FiO2


 


19 10:24     91 Nasal Cannula 2.00 


 


19 00:00 98.0 71 15 154/71 (98)    


 


19 10:15        21





Capillary Refill : Less Than 3 SecondsLess Than 3 Seconds


General Appearance:  Chronically ill, Mild Distress, Obese


Respiratory:  No Accessory Muscle Use, No Respiratory Distress, Decreased Breath

Sounds


Cardiovascular:  Regular Rate, Rhythm


Gastrointestinal:  Other (obese, tender)


Extremity:  Pedal Edema, Other (toe amputations)


Neurologic/Psychiatric:  Alert, Disoriented





Results/Procedures


Lab


Patient resulted labs reviewed.





Assessment/Plan


Assessment and Plan


Assess & Plan/Chief Complaint


Assessment





Hip fracture


abdominal pain- concerning for ischemic etiology


polycythemia vera


atrial fibrillation


PAD s/p bifemoral bypass





Plan:





Family has elected comfort measures


Palliative care consulted, appreciate assistance


Ideally would DC to hospice at NH


   may be appropriate for respite care at NH until long term plan can be sorted 

out


[facility] with [facility]


   Social work assistance with placement


I discussed with Dr Tolbert today (medical director of [facility]) in 

regards to potential for GIP admission but at this time does not appear to 

qualify 


   Increased oral morphine and added fentanyl patch   


   Will attempt to contact Anesthesia to discuss other possibilities for pain 

control





Critical Care


Critically Ill Patient





Clinical Quality Measures


DVT/VTE Risk/Contraindication:


Risk Factor Score Per Nursin


RFS Level Per Nursing on Admit:  4+=Very High











POONAM RUSH MD             2019 11:53 am

## 2019-07-31 NOTE — NUR
DR RUSH NOTIFIED THAT WE ARE UNABLE TO OBTAIN IV ACCESS.  SHE DOES NOT WANT AN ORDER FOR A 
PICC LINE.  SHE ALSO DOES NOT WANT TO GIVE HIM PO LASIX.

## 2019-07-31 NOTE — CARDIOLOGY PROGRESS NOTE
Cardiology SOAP Progress Note


Subjective:


No acute cardiac symptoms.





Objective:


I&O/Vital Signs











 7/31/19 7/31/19 7/31/19





 16:12 16:37 20:59


 


Temp 98.9  


 


Pulse 95  


 


Resp 16  


 


B/P (MAP) 182/74 (110)  


 


Pulse Ox 96 93 


 


O2 Delivery Nasal Cannula Nasal Cannula Nasal Cannula


 


O2 Flow Rate 2.00 2.00 4.00














 7/31/19





 00:00


 


Intake Total 1700 ml


 


Output Total 1500 ml


 


Balance 200 ml








Weight (Pounds):  250


Weight (Ounces):  9.0


Weight (Calculated Kilograms):  113.769198


Constitutional:  appears stated age, AAO x 3; No apparent distress; well-

developed, well-nourished


Respiratory:  No accessory muscle use, No respiratory distress, No chest tender,

No chest expansion is symmetric; chest is bilaterally symmetric; No lungs clear 

to percussion, No crackles; rhonchi; No rales, No stridor, No wheezing, No 

pleural rub, No other


Cardiovascular:  regular rate-rhythm; No irregularly irregular, No extra beats, 

No parasternal heave is noted, No JVD, No edema, No bradycardia, No tachycardia,

No point of maximal impulse, No cardiac thrills are palpable; S1 and S2; No 

gallop/S3, No gallop/S4, No diastolic murmur, No systolic murmur, No friction 

rub, No click, No other


Gastrointestional:  No tender, No soft, No round, No distended, No pulsatile 

mass, No organomegaly, No guarding, No rebound, No tenderness, No hernia, No 

mass, No audible bowel sounds, No abnormal bowel sounds, No abdominal bruits, No

spleenomegaly, No other


Genital/Rectal:  other (SWOLLEN SCROTUM)


Extremities:  No normal range of motion, No non-tender, No normal inspection, No

pedal edema, No calf tenderness, No normal capillary refill, No pelvis stable, 

No calf tenderness, No inflammation, No pedal edema, No slow capillary refill, 

No swelling, No other, No abrasion, No clubbing, No cyanosis, No ecchymosis, No 

laceration, No no lower extremity edema bilateral, No significant edema; 

tenderness; No wound


Neurologic/Psychiatric:  no motor/sensory deficits, alert, normal mood/affect, 

oriented x 3, power is 5/5 both on sides


Skin:  No normal color, No warm/dry, No cyanosis, No cool, No diaphoresis, No 

damp, No ecchymosis, No jaundice, No mottled, No pallor, No rash, No 

tattoos/piercings, No ulcerations, No rash on exposed areas, No ulcerations on 

exposed areas, No other





Results/Procedures:


Labs


Laboratory Tests


7/31/19 05:17: Glucometer 125H


7/31/19 12:16: Glucometer 65L


7/31/19 16:12: Glucometer 96


7/31/19 21:07: Glucometer 72





Microbiology


7/24/19 MRSA Screen - Final, Complete


          MRSA not isolated


7/26/19 Gram Stain - Final, Complete


          


7/26/19 Wound Culture - Final, Complete


          Proteus mirabilis


          Pseudomonas aeruginosa








A/P:


Assessment/Dx:


Polycythemia vera,


Significant PAD with previous axillary bifemoral bypass.


atrial fibrillation, patient is on Pradaxa.


Poor functional capacity.


Plan:


Post hip surgery.  postoperative period





Echocardiogram done 7/22/2019 shows normal LV function with PA pressure of 57 

mmHg.  No significant valvular heart disease.





Nuclear stress testing done on 7/23/2019 shows normal LVEF with no wall motion 

abnormalities.  However the patient could not cooperate during imaging therefore

the images are nondiagnostic.





Atrial fibrillation with controlled ventricular rate.  Rate control with 

Cardizem is recommended.  Oral anticoagulation.





Polycythemia vera,





PAD, axillary bifemoral bypass history.








Thank you for your consultation. Please call me if you have any questions.








BARBARA Beltrán MD, FACP, FACC, FSCAI, FHRS, CCDS


Interventional Cardiology


Cardiac Electrophysiology


Vascular Medicine and Endovascular Interventions











REJI BELTRÁN MD             Jul 31, 2019 23:39

## 2019-07-31 NOTE — NUR
PALLIATIVE CARE RN rounded with Dr. Tolbert on this patient.  She is consulted to assist 
with symptom management and to determination of Hospice GIP appropriateness.  It is 
determined that he is not a GIP candidate and [facility] will need to find a Respite 
SNF while family is working on funding a SNF stay.  Patient verbalizes that his pain is not 
terrible when he is still but gets awful during bed changes and clean ups.  Pre-medication 
prior to those activities is suggested.   



Dr. Roberto will transition to a Fentanyl patch.  Consult for possible nerve block was 
discussed.

## 2019-07-31 NOTE — PULMONARY PROGRESS NOTE
Subjective


Time Seen by a Provider:  11:35


Subjective/Events-last exam


No complications noted.





Sepsis Event


Evaluation


Height, Weight, BMI


Height: 5'9.00"


Weight: 250lbs. 9.0oz. 113.982920qz; 37.0 BMI


Method:Stated





Exam


Exam





Vital Signs








  Date Time  Temp Pulse Resp B/P (MAP) Pulse Ox O2 Delivery O2 Flow Rate FiO2


 


7/31/19 10:24     91 Nasal Cannula 2.00 


 


7/31/19 08:00     92 Nasal Cannula 2.00 


 


7/31/19 02:45     92 Nasal Cannula 2.00 


 


7/31/19 00:00 98.0 71 15 154/71 (98)  Nasal Cannula 2.00 


 


7/30/19 22:07     91 Nasal Cannula 2.50 


 


7/30/19 20:00     96 High Flow N/C 4.00 


 


7/30/19 16:40 97.6 78 20 199/90 (126) 96 High Flow N/C 2.00 














I & O 


 


 7/31/19





 07:00


 


Intake Total 1800 ml


 


Output Total 1900 ml


 


Balance -100 ml








Height & Weight


Height: 5'9.00"


Weight: 250lbs. 9.0oz. 113.138329ik; 37.0 BMI


Method:Stated


General Appearance:  No Apparent Distress, WD/WN, Obese


HEENT:  PERRL/EOMI, Pharynx Normal


Neck:  Full Range of Motion, Non Tender, Supple


Respiratory:  Chest Non Tender, No Accessory Muscle Use, No Respiratory 

Distress, Decreased Breath Sounds


Cardiovascular:  Regular Rate, Rhythm


Capillary Refill:  Less Than 3 Seconds


Gastrointestinal:  normal bowel sounds, non tender, soft


Extremity:  Normal Capillary Refill, Normal Inspection, No Pedal Edema


Neurologic/Psychiatric:  Alert, Depressed Affect, Disoriented


Skin:  Normal Color, Warm/Dry


Lymphatic:  No Adenopathy





Assessment/Plan


Assessment/Plan


Right hip fracture


   -Ortho following


   -s/p Surgery


   -2 liters/min oxygen - 


      -Titrate oxygen down 





PVD


Anemia


   -Monitor 


hx of CVA/nonambulatory





PT is being converted to comfort care only. I am going to sign off please call 

with any questions or concerns.











LAUREEN JOYCE DO              Jul 31, 2019 11:35

## 2019-07-31 NOTE — CONSULTATION - SURGERY
History of Present Illness


History of Present Illness


Patient Consulted On(lorene/time)


19


 22:53


Date Seen by Provider:  2019


Time Seen by Provider:  10:39


History of Present Illness


consult requested by  for concern for ischemic bowel per patient family 

request.





No patient family present at time of my visit.  Patient is a 68-year-old male 

who requires a lift for transporting which patient fell proximally 45 feet from 

the left forearm to the ground causing a right femur fracture.  Patient had 

surgical repair.  Is unable to provide any information.  The chart was reviewed.

 There was concern of possible ischemic bowel.  Patient's family over the 

weekend had declined further evaluation with CT scan.  And patient essentially 

going for comfort measures/hospice care.  Patient does open his eyes to


verbal stimuliand will tell me his name but quickly goes back to sleep.  When 

asked questions he will not provide any other answers.  When sleeping he seems 

to be comfortable.





Allergies and Home Medications


Allergies


Coded Allergies:  


     Penicillins (Verified  Allergy, Unknown, 18)





Home Medications


Aspirin 81 Mg Tablet.dr, 81 MG PO DAILY, (Reported)


Atorvastatin Calcium 10 Mg Tablet, 10 MG PO DAILY, (Reported)


Dabigatran Etexilate Mesylate 150 Mg Capsule, 150 MG PO BID, (Reported)


Escitalopram Oxalate 10 Mg Tablet, 10 MG PO DAILY, (Reported)


Famotidine 20 Mg Tablet, 20 MG PO BID, (Reported)


Furosemide 80 Mg Tablet, 80 MG PO 1700, (Reported)


Insulin Aspart 300 Units/3 Ml Solution, INJ SLIDING SCALE, (Reported)


Insulin Degludec 100 Unit/1 Ml Insuln.pen, 60 UNIT SQ HS, (Reported)


Lisinopril 40 Mg Tablet, 40 MG PO 1700, (Reported)


Metoprolol Tartrate 25 Mg Tablet, 12.5 MG PO BID, (Reported)


Potassium Chloride 20 Meq Tab.er.prt, 20 MEQ PO 1700, (Reported)


Ruxolitinib Phosphate 10 Mg Tablet, 10 MG PO BID, (Reported)


Tamsulosin HCl 0.4 Mg Cap, 0.4 MG PO DAILY, (Reported)


Tramadol HCl 50 Mg Tablet,  MG PO Q6H PRN for PAIN-MODERATE, (Reported)


Trazodone HCl 50 Mg Tablet, 50 MG PO HS, (Reported)





Patient Home Medication List


Home Medication List Reviewed:  Yes





Past Medical-Social-Family Hx


Patient Social History


Alcohol Use:  Denies Use


Recreational Drug Use:  No


Smoking Status:  Former Smoker


Former Smoker, Quit:  Dec 6, 2014


2nd Hand Smoke Exposure:  No


Recent Foreign Travel:  No


Contact w/Someone Who Travel:  No


Recent Infectious Disease Expo:  No


Recent Hopitalizations:  No





Immunizations Up To Date


Date of Influenza Vaccine:  Oct 8, 2018





Seasonal Allergies


Seasonal Allergies:  No





Surgeries


History of Surgeries:  Yes (neck, FEM BYPASS, AMPUTATION TOES )





Respiratory


History of Respiratory Disorde:  No





Cardiovascular


History of Cardiac Disorders:  Yes


Cardiac Disorders:  Deep Vein Thrombosis, Hypertension, Peripheral Vascular





Neurological


History of Neurological Disord:  Yes


Neurological Disorders:  Stroke





Reproductive System


Hx Reproductive Disorders:  No


Sexually Transmitted Disease:  No


HIV/AIDS:  No





Genitourinary


History of Genitourinary Disor:  No





Gastrointestinal


History of Gastrointestinal Di:  No





Musculoskeletal


History of Musculoskeletal Dis:  Yes


Musculoskeletal Disorders:  Amputee (TOES AMPUTATED), Fractures





Endocrine


History of Endocrine Disorders:  Yes


Endocrine Disorders:  Diabetes, Non-Insulin dep





HEENT


Loss of Vision:  Denies


Hearing Impairment:  Denies





Cancer


History of Cancer:  No





Psychosocial


History of Psychiatric Problem:  Yes


Behavioral Health Disorders:  Depression





Integumentary


History of Skin or Integumenta:  Yes (WOUNDS ON FEET/TOES)





Blood Transfusions


History of Blood Disorders:  Yes (Polycemia Vera)


Adverse Reaction to a Blood Tr:  No





Family Medical History


Significant Family History:  Heart Disease, Hypertension





Review of Systems-General


ROS-Unable to Obtain:  unable to provide due to condition





Physical Exam-General Problems


Physical Exam


Vital Signs





Vital Signs - First Documented








 19





 00:00 10:15


 


Temp 99.4 


 


Pulse 99 


 


Resp 18 


 


B/P (MAP) 149/74 (99) 


 


Pulse Ox 95 


 


O2 Delivery Nasal Cannula 


 


O2 Flow Rate 3.00 


 


FiO2  21





Capillary Refill : Less Than 3 SecondsLess Than 3 Seconds


General Appearance:  other


HEENT:  normal ENT inspection


Neck:  supple


Respiratory:  chest non-tender, no respiratory distress, no accessory muscle use


Cardiovascular:  regular rate, rhythm


Gastrointestinal:  soft, tenderness (diffuse)


Rectal:  deferred


Back:  no CVA tenderness


Extremities:  other (discomfort with movement of extremities)


Neurologic/Psychiatric:  No alert (lethargic), No normal mood/affect, No 

oriented x 3


Skin:  normal color, warm/dry


Lymphatic:  no adenopathy





Data Review


Labs


Laboratory Tests


19 05:17: Glucometer 125H


19 12:16: Glucometer 65L


19 16:12: Glucometer 96


19 21:07: Glucometer 72





Microbiology


19 MRSA Screen - Final, Complete


          MRSA not isolated


19 Gram Stain - Final, Complete


          


19 Wound Culture - Final, Complete


          Proteus mirabilis


          Pseudomonas aeruginosa





Assessment/Plan


FALL FROM LIFT


RIGHT HIP FRACTURE - STATUS POST SURGICAL FIXATION


HX OF CVA WITH RIGHT SIDED WEAKNESS


HYPERTENSION


PERIPHERAL VASCULAR DISEASE


ABDOMINAL PAIN ?ISCHEMIC BOWEL


DEPRESSION


LEUKOCYTOSIS


POLYCYTHEMIA VERA


ANEMIA








EXAM DIFFICULT DUE TO NARCOTICS FOR PAIN CONTROL,patient abdominal pain could be

related to bowel ischemia.  I did reach out to family by phone arranged by 

nursing and discussed my findings with my exam.  If they wanted to further 

evaluate for bowel ischemia/abdominal painwould recommend CT scan of the abdomen

and pelvis.  I also discussed that I don't feel this would benefit overall for 

his bowel is ischemic and with all his comorbidities if they want to continue 

with comfort measures that I would not pursue this any further.  They wish to 

continue with comfort measures/possible hospice placement.  Therefore did not 

change any orders.





Clinical Quality Measures


DVT/VTE Risk/Contraindication:


Risk Factor Score Per Nursin


RFS Level Per Nursing on Admit:  4+=Very High











YOSHI BOWERS DO              2019 22:58

## 2019-07-31 NOTE — NUR
Arrangements made for pt discharge tomorrow to [facility] with [facility] 
services. Pt 's DPOA and daughter Laura and other family members agreeable with plan. 
[facility] deferred to [facility] to transport. They were notified of need for 
non-emergent transport tomorrow. [facility]  plans to provide pt electric bed with 
air loss mattress and oxygen. Pt can provide his own medication Tawana at the nursing home. 
will need pain scripts and will fax when available tomorrow.

## 2019-08-01 NOTE — NUR
Arrangements completed for pt discharge to [facility] with [facility] 
services. [facility] here to transport and medical information given to EMS with 
physician ordered ambulance transfer form, packet of physician orders Discharge summary, and 
recent consultations for the [facility].Pt's daughter Tari here and was given 
the copy of the [place] medicaid application previously faxed for their follow-up.Pt's 
medication Jakafi was given to his daughter Tari. Copy of signed Important Message of 
Medicare Rights was placed in pt chart. Pain scripts were given to EMS to give to the 
facility for hospice services. [facility] has copy of 4 pain scripts and they were 
notified of time of pt's departure.

## 2019-08-01 NOTE — DISCHARGE SUMMARY
Diagnosis/Chief Complaint


Date of Admission


2019 at 19:50


Date of Discharge





Discharge Diagnosis





(1) Intertrochanteric fracture of right hip


Status:  Acute


(2) Anemia


Status:  Chronic


(3) Debility


Status:  Chronic


(4) History of CVA (cerebrovascular accident)


Status:  Chronic


(5) Polycythemia vera


Status:  Chronic


(6) PVD (peripheral vascular disease)


Status:  Chronic


(7) Ambulatory dysfunction


Status:  Chronic





Discharge Summary


Procedures/Consulations


Orthopedic Surgery


General Surgery


Cardiology


Pulmonology





Discharge Physical Exam


Allergies:  


Coded Allergies:  


     Penicillins (Verified  Allergy, Unknown, 18)


Vitals & I&Os





Vital Signs








  Date Time  Temp Pulse Resp B/P (MAP) Pulse Ox O2 Delivery O2 Flow Rate FiO2


 


19 08:42  103   94   


 


19 08:39      Nasal Cannula 2.00 


 


19 08:13 98.6  20 153/92 (112)    


 


19 10:15        21








General Appearance:  Chronically ill, Obese


Cardiovascular:  Regular Rate, Rhythm, No Murmur





Hospital Course


Pt is a 68yoCM with a PMH of PAD s/p bifemoral aortic bypass, polycythemia vera 

who presented after a fall during transfer and was found to have a hip fracture.

He underwent preoperative cardiac evaluation and was deemed to be higher risk 

but elected to proceed with surgery for pain control. He underwent surgical 

repair but developed severe abdominal pain concerning for ischemic etiology. 

Discussions were had with family regarding goals of care and they elected to not

proceed with further investigation to determine etiology as they would not want 

further surgical intervention. They instead elected to enroll in hospice. 

Hospice consulted on him here to evaluate him for possible GIP admission but he 

did not qualify. He was then accepted at NH for hospice.


Labs (last 24 hrs)


Laboratory Tests


19 12:16: Glucometer 65L


19 16:12: Glucometer 96


19 21:07: Glucometer 72


19 05:32: Glucometer 67L


19 11:45: Glucometer 188H





Microbiology


19 MRSA Screen - Final, Complete


          MRSA not isolated


19 Gram Stain - Final, Complete


          


19 Wound Culture - Final, Complete


          Proteus mirabilis


          Pseudomonas aeruginosa


Patient resulted labs reviewed.


Pending Labs


Laboratory Tests


19 05:32: Glucometer 67


19 11:45: Glucometer 188








Discussion & Recommendations


Discharge Planning:  >30 minutes discharge planning





Discharge


Home Medications:





Active Scripts


Active


Lorazepam 2 Mg/1 Ml Oral.conc 1 Mg PO Q2H PRN


Fentanyl Patch 75MCG (Fentanyl) 1 Each Patch.td72 75 Mcg TD Q72H


Morphine Sulfate ER (Morphine Sulfate) 15 Mg Tablet.er 45 Mg PO Q12HR


Percocet 5-325 mg Tablet (Oxycodone HCl/Acetaminophen) 1 Each Tablet 1 Tab PO 

Q4H PRN


Reported


Tramadol HCl 50 Mg Tablet  Mg PO Q6H PRN


Aspir 81 (Aspirin) 81 Mg Tablet.dr 81 Mg PO DAILY


Novolog Flexpen (Insulin Aspart) 300 Units/3 Ml Solution  INJ SLIDING SCALE


Escitalopram Oxalate 10 Mg Tablet 10 Mg PO DAILY


Jakafi (Ruxolitinib Phosphate) 10 Mg Tablet 10 Mg PO BID


Flomax (Tamsulosin HCl) 0.4 Mg Cap 0.4 Mg PO DAILY


Famotidine 20 Mg Tablet 20 Mg PO BID


Tresiba Flextouch U-100 (Insulin Degludec) 100 Unit/1 Ml Insuln.pen 60 Unit SQ 

HS


Trazodone HCl 50 Mg Tablet 50 Mg PO HS


Atorvastatin Calcium 10 Mg Tablet 10 Mg PO DAILY


Pradaxa (Dabigatran Etexilate Mesylate) 150 Mg Capsule 150 Mg PO BID


Potassium Chloride 20 Meq Tab.er.prt 20 Meq PO 1700


Metoprolol Tartrate 25 Mg Tablet 12.5 Mg PO BID


Lisinopril 40 Mg Tablet 40 Mg PO 1700


Furosemide 80 Mg Tablet 80 Mg PO 1700





Instructions to patient/family


Please see electronic discharge instructions given to patient.





Clinical Quality Measures


DVT/VTE Risk/Contraindication:


Risk Factor Score Per Nursin


RFS Level Per Nursing on Admit:  4+=Very High





Problem Qualifiers





(1) Intertrochanteric fracture of right hip:  


Encounter type:  initial encounter  Fracture type:  closed  Fracture alignment: 

displaced  Qualified Codes:  S72.141A - Displaced intertrochanteric fracture of 

right femur, initial encounter for closed fracture


(2) Anemia:  


Anemia type:  unspecified type  Qualified Codes:  D64.9 - Anemia, unspecified








POONAM RUSH MD              Aug 1, 2019 8:54 am

## 2019-08-01 NOTE — NUR
PATIENT FASTING BLOOD GLUCOSE THIS MORNING IS 67. PATIENT DRANK A GLUCERNA AND ATE A FEW 
BITES OF PEANUT BUTTER. LEVEMIR 65UNITS HELD LAST NIGHT AS BLOOD GLUCOSE WAS 72 AT HS. 
PHYSICIAN NOTIFIED THAT LEVEMIR HELD.

## 2019-08-01 NOTE — CARDIOLOGY PROGRESS NOTE
Cardiology SOAP Progress Note


Subjective:


Patient complains of right lower extremity discomfort.





Objective:


I&O/Vital Signs











 8/1/19 8/1/19 8/1/19 8/1/19





 08:00 08:13 08:39 08:42


 


Temp  98.6  


 


Pulse  103  103


 


Resp  20  


 


B/P (MAP)  153/92 (112)  


 


Pulse Ox  97 94 94


 


O2 Delivery Nasal Cannula High Flow N/C Nasal Cannula 


 


O2 Flow Rate 2.00 2.00 2.00 














 8/1/19





 00:00


 


Intake Total 670 ml


 


Output Total 1050 ml


 


Balance -380 ml








Weight (Pounds):  250


Weight (Ounces):  9.0


Weight (Calculated Kilograms):  113.626312


Constitutional:  appears stated age, AAO x 3; No apparent distress; 

well-developed, well-nourished


Respiratory:  No accessory muscle use, No respiratory distress, No chest tender,

No chest expansion is symmetric; chest is bilaterally symmetric; No lungs clear 

to percussion, No crackles; rhonchi; No rales, No stridor, No wheezing, No 

pleural rub, No other


Cardiovascular:  regular rate-rhythm; No irregularly irregular, No extra beats, 

No parasternal heave is noted, No JVD, No edema, No bradycardia, No tachycardia,

No point of maximal impulse, No cardiac thrills are palpable; S1 and S2; No 

gallop/S3, No gallop/S4, No diastolic murmur, No systolic murmur, No friction 

rub, No click, No other


Gastrointestional:  No tender, No soft, No round, No distended, No pulsatile 

mass, No organomegaly, No guarding, No rebound, No tenderness, No hernia, No 

mass, No audible bowel sounds, No abnormal bowel sounds, No abdominal bruits, No

spleenomegaly, No other


Genital/Rectal:  other (SWOLLEN SCROTUM)


Extremities:  No normal range of motion, No non-tender, No normal inspection, No

pedal edema, No calf tenderness, No normal capillary refill, No pelvis stable, 

No calf tenderness, No inflammation, No pedal edema, No slow capillary refill, 

No swelling, No other, No abrasion, No clubbing, No cyanosis, No ecchymosis, No 

laceration, No no lower extremity edema bilateral, No significant edema; 

tenderness; No wound


Neurologic/Psychiatric:  no motor/sensory deficits, alert, normal mood/affect, 

oriented x 3, power is 5/5 both on sides


Skin:  No normal color, No warm/dry, No cyanosis, No cool, No diaphoresis, No 

damp, No ecchymosis, No jaundice, No mottled, No pallor, No rash, No 

tattoos/piercings, No ulcerations, No rash on exposed areas, No ulcerations on 

exposed areas, No other





Results/Procedures:


Labs


Laboratory Tests


7/31/19 16:12: Glucometer 96


7/31/19 21:07: Glucometer 72


8/1/19 05:32: Glucometer 67L


8/1/19 11:45: Glucometer 188H





Microbiology


7/24/19 MRSA Screen - Final, Complete


          MRSA not isolated


7/26/19 Gram Stain - Final, Complete


          


7/26/19 Wound Culture - Final, Complete


          Proteus mirabilis


          Pseudomonas aeruginosa








A/P:


Assessment/Dx:


Polycythemia vera,


Significant PAD with previous axillary bifemoral bypass.


atrial fibrillation, patient is on Pradaxa.


Poor functional capacity.


Plan:


Post hip surgery.  postoperative period





Echocardiogram done 7/22/2019 shows normal LV function with PA pressure of 57 

mmHg.  No significant valvular heart disease.





Nuclear stress testing done on 7/23/2019 shows normal LVEF with no wall motion 

abnormalities.  However the patient could not cooperate during imaging therefore

the images are nondiagnostic.





Atrial fibrillation with controlled ventricular rate.  Rate control with 

Cardizem is recommended.  Oral anticoagulation.





Polycythemia vera,





PAD, axillary bifemoral bypass history. 





Patient being transferred to a nursing facility.  He denied any chest pain 

however was complaining of discomfort in the right lower extremity and then 

earlier in his belly.  I requested the nurse to let the primary team know.








Thank you for your consultation. Please call me if you have any questions.








BARBARA Beltrán MD, FACP, FACC, FSCAI, FHRS, CCDS


Interventional Cardiology


Cardiac Electrophysiology


Vascular Medicine and Endovascular Interventions











REJI BELTRÁN MD             Aug 1, 2019 2:30 pm

## 2021-08-03 NOTE — DIAGNOSTIC IMAGING REPORT
EXAMINATION: Right humerus, 2 views.



INDICATION: Traumatic right upper extremity pain.



COMPARISON: None available.



FINDINGS: There is osteopenia of the visualized osseous

structures, limiting detailed evaluation. There is degenerative

change of the proximal humerus. No definite fracture is

appreciated. Bony alignment is maintained. The humeral head is

well seated in the glenohumeral joint. No soft tissue

abnormality.



IMPRESSION: Degenerative changes noted involving the proximal

humeral head. No definite fracture is appreciated.  



Dictated by: 



  Dictated on workstation # [id number] Cimetidine Counseling:  I discussed with the patient the risks of Cimetidine including but not limited to gynecomastia, headache, diarrhea, nausea, drowsiness, arrhythmias, pancreatitis, skin rashes, psychosis, bone marrow suppression and kidney toxicity.